# Patient Record
Sex: FEMALE | Race: OTHER | NOT HISPANIC OR LATINO | URBAN - METROPOLITAN AREA
[De-identification: names, ages, dates, MRNs, and addresses within clinical notes are randomized per-mention and may not be internally consistent; named-entity substitution may affect disease eponyms.]

---

## 2017-02-19 ENCOUNTER — EMERGENCY (EMERGENCY)
Facility: HOSPITAL | Age: 32
LOS: 1 days | Discharge: HOME | End: 2017-02-19
Admitting: EMERGENCY MEDICINE
Payer: COMMERCIAL

## 2017-02-19 VITALS
OXYGEN SATURATION: 100 % | DIASTOLIC BLOOD PRESSURE: 88 MMHG | RESPIRATION RATE: 16 BRPM | HEART RATE: 119 BPM | SYSTOLIC BLOOD PRESSURE: 130 MMHG | TEMPERATURE: 99 F

## 2017-02-19 VITALS
DIASTOLIC BLOOD PRESSURE: 70 MMHG | RESPIRATION RATE: 20 BRPM | TEMPERATURE: 99 F | HEART RATE: 102 BPM | SYSTOLIC BLOOD PRESSURE: 110 MMHG | OXYGEN SATURATION: 100 %

## 2017-02-19 LAB
ALBUMIN SERPL ELPH-MCNC: 4.5 G/DL — SIGNIFICANT CHANGE UP (ref 3.3–5)
ALP SERPL-CCNC: 52 U/L — SIGNIFICANT CHANGE UP (ref 40–120)
ALT FLD-CCNC: 7 U/L — SIGNIFICANT CHANGE UP (ref 4–33)
APPEARANCE UR: CLEAR — SIGNIFICANT CHANGE UP
AST SERPL-CCNC: 12 U/L — SIGNIFICANT CHANGE UP (ref 4–32)
BACTERIA # UR AUTO: SIGNIFICANT CHANGE UP
BILIRUB SERPL-MCNC: 0.2 MG/DL — SIGNIFICANT CHANGE UP (ref 0.2–1.2)
BILIRUB UR-MCNC: NEGATIVE — SIGNIFICANT CHANGE UP
BLD GP AB SCN SERPL QL: NEGATIVE — SIGNIFICANT CHANGE UP
BLOOD UR QL VISUAL: HIGH
BUN SERPL-MCNC: 8 MG/DL — SIGNIFICANT CHANGE UP (ref 7–23)
CALCIUM SERPL-MCNC: 9.2 MG/DL — SIGNIFICANT CHANGE UP (ref 8.4–10.5)
CHLORIDE SERPL-SCNC: 99 MMOL/L — SIGNIFICANT CHANGE UP (ref 98–107)
CO2 SERPL-SCNC: 22 MMOL/L — SIGNIFICANT CHANGE UP (ref 22–31)
COLOR SPEC: SIGNIFICANT CHANGE UP
CREAT SERPL-MCNC: 0.52 MG/DL — SIGNIFICANT CHANGE UP (ref 0.5–1.3)
GLUCOSE SERPL-MCNC: 123 MG/DL — HIGH (ref 70–99)
GLUCOSE UR-MCNC: NEGATIVE — SIGNIFICANT CHANGE UP
HCG SERPL-ACNC: SIGNIFICANT CHANGE UP MIU/ML
HCT VFR BLD CALC: 35.6 % — SIGNIFICANT CHANGE UP (ref 34.5–45)
HGB BLD-MCNC: 11.7 G/DL — SIGNIFICANT CHANGE UP (ref 11.5–15.5)
KETONES UR-MCNC: NEGATIVE — SIGNIFICANT CHANGE UP
LEUKOCYTE ESTERASE UR-ACNC: NEGATIVE — SIGNIFICANT CHANGE UP
MCHC RBC-ENTMCNC: 28.2 PG — SIGNIFICANT CHANGE UP (ref 27–34)
MCHC RBC-ENTMCNC: 32.9 % — SIGNIFICANT CHANGE UP (ref 32–36)
MCV RBC AUTO: 85.8 FL — SIGNIFICANT CHANGE UP (ref 80–100)
MUCOUS THREADS # UR AUTO: SIGNIFICANT CHANGE UP
NITRITE UR-MCNC: NEGATIVE — SIGNIFICANT CHANGE UP
PH UR: 6 — SIGNIFICANT CHANGE UP (ref 4.6–8)
PLATELET # BLD AUTO: 209 K/UL — SIGNIFICANT CHANGE UP (ref 150–400)
PMV BLD: 12 FL — SIGNIFICANT CHANGE UP (ref 7–13)
POTASSIUM SERPL-MCNC: 3.9 MMOL/L — SIGNIFICANT CHANGE UP (ref 3.5–5.3)
POTASSIUM SERPL-SCNC: 3.9 MMOL/L — SIGNIFICANT CHANGE UP (ref 3.5–5.3)
PROT SERPL-MCNC: 8 G/DL — SIGNIFICANT CHANGE UP (ref 6–8.3)
PROT UR-MCNC: NEGATIVE — SIGNIFICANT CHANGE UP
RBC # BLD: 4.15 M/UL — SIGNIFICANT CHANGE UP (ref 3.8–5.2)
RBC # FLD: 15 % — HIGH (ref 10.3–14.5)
RBC CASTS # UR COMP ASSIST: SIGNIFICANT CHANGE UP (ref 0–?)
RH IG SCN BLD-IMP: POSITIVE — SIGNIFICANT CHANGE UP
SODIUM SERPL-SCNC: 137 MMOL/L — SIGNIFICANT CHANGE UP (ref 135–145)
SP GR SPEC: 1.01 — SIGNIFICANT CHANGE UP (ref 1–1.03)
SQUAMOUS # UR AUTO: SIGNIFICANT CHANGE UP
UROBILINOGEN FLD QL: NORMAL E.U. — SIGNIFICANT CHANGE UP (ref 0.1–0.2)
WBC # BLD: 8.65 K/UL — SIGNIFICANT CHANGE UP (ref 3.8–10.5)
WBC # FLD AUTO: 8.65 K/UL — SIGNIFICANT CHANGE UP (ref 3.8–10.5)
WBC UR QL: SIGNIFICANT CHANGE UP (ref 0–?)

## 2017-02-19 PROCEDURE — 93010 ELECTROCARDIOGRAM REPORT: CPT | Mod: NC

## 2017-02-19 PROCEDURE — 76830 TRANSVAGINAL US NON-OB: CPT | Mod: 26

## 2017-02-19 PROCEDURE — 99284 EMERGENCY DEPT VISIT MOD MDM: CPT | Mod: 25

## 2017-02-19 RX ORDER — SODIUM CHLORIDE 9 MG/ML
1000 INJECTION INTRAMUSCULAR; INTRAVENOUS; SUBCUTANEOUS ONCE
Qty: 0 | Refills: 0 | Status: COMPLETED | OUTPATIENT
Start: 2017-02-19 | End: 2017-02-19

## 2017-02-19 RX ADMIN — SODIUM CHLORIDE 1000 MILLILITER(S): 9 INJECTION INTRAMUSCULAR; INTRAVENOUS; SUBCUTANEOUS at 17:10

## 2017-02-19 NOTE — ED PROVIDER NOTE - PROGRESS NOTE DETAILS
NICHOLE Melvin: TVUS consistent with fetal demise. Discussed results with patient. Gyn consulted. The scribe's documentation has been prepared under my direction and personally reviewed by me in its entirety. I confirm that the note above accurately reflects all work, treatment, procedures, and medical decision making performed by me.  VINOD PENA PA-C NICHOLE Ambrosio: received as sign out from NICHOLE Melvin, seen by GYN, [facility] for out patient management. pt understands follow up instruction and agreed with plan.

## 2017-02-19 NOTE — ED PROVIDER NOTE - MEDICAL DECISION MAKING DETAILS
32 y/o F , currently 12 weeks pregnant by LMP presents with lower abdominal pain. Has not had confirmatory ultrasound. Plan: type and screen, hCG, transvaginal ultrasound to rule out ectopic pregnancy vs. miscarriage.

## 2017-02-19 NOTE — ED PROVIDER NOTE - OBJECTIVE STATEMENT
30 y/o F with no significant PMHx, , currently 12 weeks pregnant by LMP presents to the ED complaining of suprapubic cramping and vaginal spotting x3 days. Has not seen her GYN yet for confirmatory ultrasound. Complaining of lower back pain. Denies fevers, chills, nausea, vomiting, dysuria, or any other complaints. First pregnancy was a spontaneous .    GYN: Dr. Neil 32 y/o F with no significant PMHx, , currently 12 weeks pregnant by LMP presents to the ED complaining of suprapubic cramping and vaginal spotting x3 days. Has not seen her GYN yet for confirmatory ultrasound. Also complaining of lower back pain. Denies fevers, chills, nausea, vomiting, dysuria, or any other complaints. First pregnancy was a spontaneous .    GYN: Dr. Neil

## 2017-02-19 NOTE — ED PROVIDER NOTE - PLAN OF CARE
Follow up at your previously scheduled OBGYN appointment on 2/23 at 3:30pm or call office at [phone] for earlier appointment. Rest, drink plenty of fluids.  Advance activity as tolerated.  Continue all previously prescribed medications as directed. You can use motrin 600mg every 6-8 hours for pain or fever, and/or Tylenol 650 mg every 4 hours for pain/fever. Follow up with your primary care physician in 48-72 hours- bring copies of your results.  Return to the emergency department for chest pain, shortness of breath, dizziness, or worsening, concerning, or persistent symptoms.

## 2017-02-19 NOTE — ED ADULT TRIAGE NOTE - CHIEF COMPLAINT QUOTE
C/o vaginal spotting and lower abdominal pain x 4 days and fevers which occurs at night and night sweats x 2 weeks, and dizziness/lightheadedness x 3-4 days which started after 12 hours flight. 12 weeks pregnant. Denies saturated any pads/cough/chest pain/sob/lower extremities edema.

## 2017-02-19 NOTE — ED PROVIDER NOTE - NS ED MD SCRIBE ATTENDING SCRIBE SECTIONS
PAST MEDICAL/SURGICAL/SOCIAL HISTORY/REVIEW OF SYSTEMS/VITAL SIGNS( Pullset)/DISPOSITION/HIV/HISTORY OF PRESENT ILLNESS/PHYSICAL EXAM

## 2017-02-19 NOTE — ED PROVIDER NOTE - CARE PLAN
Principal Discharge DX:	Fetal demise due to miscarriage Principal Discharge DX:	Fetal demise due to miscarriage  Instructions for follow-up, activity and diet:	Follow up at your previously scheduled OBGYN appointment on 2/23 at 3:30pm or call office at [phone] for earlier appointment. Rest, drink plenty of fluids.  Advance activity as tolerated.  Continue all previously prescribed medications as directed. You can use motrin 600mg every 6-8 hours for pain or fever, and/or Tylenol 650 mg every 4 hours for pain/fever. Follow up with your primary care physician in 48-72 hours- bring copies of your results.  Return to the emergency department for chest pain, shortness of breath, dizziness, or worsening, concerning, or persistent symptoms.

## 2017-02-21 LAB
BACTERIA UR CULT: SIGNIFICANT CHANGE UP
SPECIMEN SOURCE: SIGNIFICANT CHANGE UP

## 2017-02-23 ENCOUNTER — APPOINTMENT (OUTPATIENT)
Dept: OBGYN | Facility: CLINIC | Age: 32
End: 2017-02-23

## 2017-02-23 VITALS
WEIGHT: 105 LBS | BODY MASS INDEX: 17.49 KG/M2 | SYSTOLIC BLOOD PRESSURE: 112 MMHG | HEART RATE: 98 BPM | HEIGHT: 65 IN | DIASTOLIC BLOOD PRESSURE: 75 MMHG

## 2017-02-24 ENCOUNTER — OUTPATIENT (OUTPATIENT)
Dept: OUTPATIENT SERVICES | Facility: HOSPITAL | Age: 32
LOS: 1 days | End: 2017-02-24

## 2017-02-24 VITALS
DIASTOLIC BLOOD PRESSURE: 58 MMHG | HEIGHT: 55 IN | RESPIRATION RATE: 16 BRPM | WEIGHT: 106.04 LBS | TEMPERATURE: 99 F | OXYGEN SATURATION: 98 % | HEART RATE: 89 BPM | SYSTOLIC BLOOD PRESSURE: 92 MMHG

## 2017-02-24 DIAGNOSIS — O02.1 MISSED ABORTION: ICD-10-CM

## 2017-02-24 DIAGNOSIS — K21.9 GASTRO-ESOPHAGEAL REFLUX DISEASE WITHOUT ESOPHAGITIS: ICD-10-CM

## 2017-02-24 DIAGNOSIS — Z98.890 OTHER SPECIFIED POSTPROCEDURAL STATES: Chronic | ICD-10-CM

## 2017-02-24 LAB
BLD GP AB SCN SERPL QL: NEGATIVE — SIGNIFICANT CHANGE UP
BUN SERPL-MCNC: 10 MG/DL — SIGNIFICANT CHANGE UP (ref 7–23)
CALCIUM SERPL-MCNC: 8.9 MG/DL — SIGNIFICANT CHANGE UP (ref 8.4–10.5)
CHLORIDE SERPL-SCNC: 100 MMOL/L — SIGNIFICANT CHANGE UP (ref 98–107)
CO2 SERPL-SCNC: 23 MMOL/L — SIGNIFICANT CHANGE UP (ref 22–31)
CREAT SERPL-MCNC: 0.59 MG/DL — SIGNIFICANT CHANGE UP (ref 0.5–1.3)
GLUCOSE SERPL-MCNC: 79 MG/DL — SIGNIFICANT CHANGE UP (ref 70–99)
HCT VFR BLD CALC: 33.5 % — LOW (ref 34.5–45)
HGB BLD-MCNC: 11 G/DL — LOW (ref 11.5–15.5)
MCHC RBC-ENTMCNC: 28.2 PG — SIGNIFICANT CHANGE UP (ref 27–34)
MCHC RBC-ENTMCNC: 32.8 % — SIGNIFICANT CHANGE UP (ref 32–36)
MCV RBC AUTO: 85.9 FL — SIGNIFICANT CHANGE UP (ref 80–100)
PLATELET # BLD AUTO: 188 K/UL — SIGNIFICANT CHANGE UP (ref 150–400)
PMV BLD: 12.6 FL — SIGNIFICANT CHANGE UP (ref 7–13)
POTASSIUM SERPL-MCNC: 3.8 MMOL/L — SIGNIFICANT CHANGE UP (ref 3.5–5.3)
POTASSIUM SERPL-SCNC: 3.8 MMOL/L — SIGNIFICANT CHANGE UP (ref 3.5–5.3)
RBC # BLD: 3.9 M/UL — SIGNIFICANT CHANGE UP (ref 3.8–5.2)
RBC # FLD: 15 % — HIGH (ref 10.3–14.5)
RH IG SCN BLD-IMP: POSITIVE — SIGNIFICANT CHANGE UP
SODIUM SERPL-SCNC: 139 MMOL/L — SIGNIFICANT CHANGE UP (ref 135–145)
WBC # BLD: 7.82 K/UL — SIGNIFICANT CHANGE UP (ref 3.8–10.5)
WBC # FLD AUTO: 7.82 K/UL — SIGNIFICANT CHANGE UP (ref 3.8–10.5)

## 2017-02-24 NOTE — H&P PST ADULT - NEGATIVE OPHTHALMOLOGIC SYMPTOMS
no loss of vision L/no blurred vision L/no pain R/no loss of vision R/no blurred vision R/no diplopia/no pain L/no lacrimation L/no scleral injection L/no photophobia/no irritation R/no discharge R/no scleral injection R/no discharge L/no lacrimation R/no irritation L

## 2017-02-24 NOTE — H&P PST ADULT - RS GEN PE MLT RESP DETAILS PC
good air movement/respirations non-labored/clear to auscultation bilaterally/no chest wall tenderness/breath sounds equal/airway patent

## 2017-02-24 NOTE — H&P PST ADULT - NEGATIVE PSYCHIATRIC SYMPTOMS
no suicidal ideation/no auditory hallucinations/no hyperactivity/no insomnia/no visual hallucinations/no anxiety/no mood swings/no depression/no paranoia/no memory loss/no agitation

## 2017-02-24 NOTE — H&P PST ADULT - NEGATIVE SKIN SYMPTOMS
no brittle nails/no hair loss/no itching/no dryness/no pitted nails/no rash/no change in size/color of mole/no tumor

## 2017-02-24 NOTE — H&P PST ADULT - NEGATIVE GENERAL GENITOURINARY SYMPTOMS
no flank pain R/no dysuria/no nocturia/no flank pain L/no incontinence/no urinary hesitancy/no urine discoloration/no bladder infections/no hematuria/normal urinary frequency

## 2017-02-24 NOTE — H&P PST ADULT - FAMILY HISTORY
Mother  Still living? Yes, Estimated age: Age Unknown  Family history of diabetes mellitus (DM), Age at diagnosis: Age Unknown  Family history of hypertension, Age at diagnosis: Age Unknown     Father  Still living? Unknown  Family history of hypertension, Age at diagnosis: Age Unknown     Aunt  Still living? No  Family history of stroke, Age at diagnosis: Age Unknown

## 2017-02-24 NOTE — H&P PST ADULT - NEGATIVE ENMT SYMPTOMS
no dry mouth/no nasal obstruction/no post-nasal discharge/no dysphagia/no vertigo/no nose bleeds/no ear pain/no nasal discharge/no hearing difficulty/no tinnitus/no recurrent cold sores/no abnormal taste sensation/no sinus symptoms/no nasal congestion

## 2017-02-24 NOTE — H&P PST ADULT - NSANTHOSAYNRD_GEN_A_CORE
No. DIEGO screening performed.  STOP BANG Legend: 0-2 = LOW Risk; 3-4 = INTERMEDIATE Risk; 5-8 = HIGH Risk/Never done

## 2017-02-24 NOTE — H&P PST ADULT - NEGATIVE GENERAL SYMPTOMS
no malaise/no polyuria/no anorexia/no sweating/no weight loss/no polyphagia/no fever/no polydipsia/no weight gain

## 2017-02-24 NOTE — H&P PST ADULT - NEGATIVE MUSCULOSKELETAL SYMPTOMS
no muscle weakness/no arthritis/no arthralgia/no stiffness/no leg pain R/no joint swelling/no myalgia/no arm pain L/no arm pain R/no neck pain/no muscle cramps/no leg pain L

## 2017-02-24 NOTE — H&P PST ADULT - NEGATIVE CARDIOVASCULAR SYMPTOMS
no peripheral edema/no chest pain/no claudication/no paroxysmal nocturnal dyspnea/no orthopnea/no palpitations

## 2017-02-24 NOTE — H&P PST ADULT - NEGATIVE BREAST SYMPTOMS
no breast tenderness R/no breast tenderness L/no breast lump L/no nipple discharge R/no nipple discharge L/no breast lump R

## 2017-02-24 NOTE — H&P PST ADULT - NEGATIVE NEUROLOGICAL SYMPTOMS
no hemiparesis/no facial palsy/no tremors/no vertigo/no difficulty walking/no headache/no confusion/no syncope/no loss of consciousness/no generalized seizures/no focal seizures/no weakness/no loss of sensation/no transient paralysis/no paresthesias

## 2017-02-24 NOTE — H&P PST ADULT - HISTORY OF PRESENT ILLNESS
30 y/o female with PMH of prior miscarriage  and GERD. Presents to PST with a preop dx of missed  and to be evaluated for a scheduled DVC on 17. Pt states is 12 wks but told current miscarriage occurred at 8 wks gestation. Pt is , LMP 16. Denies any complaints at this time.

## 2017-02-24 NOTE — H&P PST ADULT - NEGATIVE ALLERGY TYPES
no reactions to food/no indoor environmental allergies/no reactions to insect bites/no outdoor environmental allergies/no reactions to medicines/no reactions to animals

## 2017-02-24 NOTE — H&P PST ADULT - NEGATIVE GASTROINTESTINAL SYMPTOMS
no vomiting/no melena/no abdominal pain/no constipation/no flatulence/no change in bowel habits/no hematochezia/no nausea/no diarrhea

## 2017-02-26 ENCOUNTER — RESULT REVIEW (OUTPATIENT)
Age: 32
End: 2017-02-26

## 2017-02-27 ENCOUNTER — APPOINTMENT (OUTPATIENT)
Dept: OBGYN | Facility: HOSPITAL | Age: 32
End: 2017-02-27

## 2017-02-27 ENCOUNTER — OUTPATIENT (OUTPATIENT)
Dept: OUTPATIENT SERVICES | Facility: HOSPITAL | Age: 32
LOS: 1 days | Discharge: HOME | End: 2017-02-27
Payer: COMMERCIAL

## 2017-02-27 ENCOUNTER — TRANSCRIPTION ENCOUNTER (OUTPATIENT)
Age: 32
End: 2017-02-27

## 2017-02-27 VITALS
OXYGEN SATURATION: 100 % | RESPIRATION RATE: 20 BRPM | SYSTOLIC BLOOD PRESSURE: 103 MMHG | HEART RATE: 105 BPM | DIASTOLIC BLOOD PRESSURE: 60 MMHG | TEMPERATURE: 99 F

## 2017-02-27 VITALS
HEIGHT: 61 IN | DIASTOLIC BLOOD PRESSURE: 57 MMHG | OXYGEN SATURATION: 100 % | HEART RATE: 88 BPM | WEIGHT: 106.04 LBS | TEMPERATURE: 99 F | RESPIRATION RATE: 14 BRPM | SYSTOLIC BLOOD PRESSURE: 89 MMHG

## 2017-02-27 DIAGNOSIS — O02.1 MISSED ABORTION: ICD-10-CM

## 2017-02-27 DIAGNOSIS — Z98.890 OTHER SPECIFIED POSTPROCEDURAL STATES: Chronic | ICD-10-CM

## 2017-02-27 PROCEDURE — 88305 TISSUE EXAM BY PATHOLOGIST: CPT | Mod: 26

## 2017-02-27 PROCEDURE — 59820 CARE OF MISCARRIAGE: CPT | Mod: GC

## 2017-02-27 RX ORDER — FOLIC ACID 0.8 MG
1 TABLET ORAL
Qty: 0 | Refills: 0 | COMMUNITY

## 2017-02-27 RX ORDER — SODIUM CHLORIDE 9 MG/ML
1000 INJECTION, SOLUTION INTRAVENOUS
Qty: 0 | Refills: 0 | Status: DISCONTINUED | OUTPATIENT
Start: 2017-02-27 | End: 2017-02-27

## 2017-02-27 NOTE — ASU DISCHARGE PLAN (ADULT/PEDIATRIC). - MEDICATION SUMMARY - MEDICATIONS TO STOP TAKING
I will STOP taking the medications listed below when I get home from the hospital:    folic acid 1 mg oral tablet  -- 1 tab(s) by mouth once a day    Prenatal Multivitamins oral capsule  -- 1 cap(s) by mouth once a day

## 2017-02-27 NOTE — ASU DISCHARGE PLAN (ADULT/PEDIATRIC). - MEDICATION SUMMARY - MEDICATIONS TO TAKE
I will START or STAY ON the medications listed below when I get home from the hospital:    Tylenol 500 mg oral tablet  -- 2 tab(s) by mouth every 6 hours, As Needed  -- Indication: For post-op pain    PriLOSEC 20 mg oral delayed release capsule  -- 1 cap(s) by mouth once a day  -- Indication: For home med

## 2017-02-27 NOTE — ASU DISCHARGE PLAN (ADULT/PEDIATRIC). - NURSING INSTRUCTIONS
You were given IV Tylenol for pain management.  Please DO NOT take tylenol or products that contain tylenol for the next 6-8 hours (until 3:25PM). Please do not exceed 3000mg in 24hours. You were given Toradol for pain management. Please DO Not take Motrin/Ibuprofen/Advil or Aleve (NSAIDS) for the next 6 hours (Until 3:15PM).

## 2017-02-27 NOTE — ASU DISCHARGE PLAN (ADULT/PEDIATRIC). - SPECIAL INSTRUCTIONS
Nothing in vagina, no intercourse, no douching, no tampons, no tub baths, and no swimming for about 2 weeks or until cleared by MD. Contact your doctor if you are soaking a pad every 30 minutes to an houror experience clots. Call your doctor for any SIGNS OR SYMPTOMS OF INFECTION: Fever, chills, temperature  100.4 or higher or have a foul smelling discharge.

## 2017-03-01 ENCOUNTER — CLINICAL ADVICE (OUTPATIENT)
Age: 32
End: 2017-03-01

## 2017-03-15 ENCOUNTER — RESULT REVIEW (OUTPATIENT)
Age: 32
End: 2017-03-15

## 2017-03-16 LAB — SURGICAL PATHOLOGY STUDY: SIGNIFICANT CHANGE UP

## 2017-03-16 PROCEDURE — 88363 XM ARCHIVE TISSUE MOLEC ANAL: CPT

## 2017-03-17 ENCOUNTER — APPOINTMENT (OUTPATIENT)
Dept: OBGYN | Facility: CLINIC | Age: 32
End: 2017-03-17

## 2017-03-17 VITALS
DIASTOLIC BLOOD PRESSURE: 76 MMHG | SYSTOLIC BLOOD PRESSURE: 112 MMHG | HEIGHT: 65 IN | WEIGHT: 108 LBS | BODY MASS INDEX: 17.99 KG/M2 | HEART RATE: 102 BPM

## 2017-03-24 ENCOUNTER — APPOINTMENT (OUTPATIENT)
Dept: HUMAN REPRODUCTION | Facility: CLINIC | Age: 32
End: 2017-03-24

## 2017-03-28 LAB — SURGICAL PATHOLOGY STUDY: SIGNIFICANT CHANGE UP

## 2017-03-30 ENCOUNTER — APPOINTMENT (OUTPATIENT)
Dept: OBGYN | Facility: CLINIC | Age: 32
End: 2017-03-30

## 2017-03-30 VITALS
BODY MASS INDEX: 18.16 KG/M2 | HEART RATE: 99 BPM | WEIGHT: 109 LBS | HEIGHT: 65 IN | DIASTOLIC BLOOD PRESSURE: 74 MMHG | SYSTOLIC BLOOD PRESSURE: 119 MMHG

## 2017-04-03 ENCOUNTER — MESSAGE (OUTPATIENT)
Age: 32
End: 2017-04-03

## 2017-04-03 DIAGNOSIS — Z87.440 PERSONAL HISTORY OF URINARY (TRACT) INFECTIONS: ICD-10-CM

## 2017-04-03 LAB
BACTERIA UR CULT: ABNORMAL
HCG SERPL-MCNC: 8 MIU/ML

## 2017-04-06 ENCOUNTER — OTHER (OUTPATIENT)
Age: 32
End: 2017-04-06

## 2017-04-07 LAB — HCG SERPL-MCNC: 5 MIU/ML

## 2017-04-21 ENCOUNTER — APPOINTMENT (OUTPATIENT)
Dept: HUMAN REPRODUCTION | Facility: CLINIC | Age: 32
End: 2017-04-21

## 2017-04-26 LAB
HCG SERPL-MCNC: 2 MIU/ML
HCG SERPL-MCNC: 3 MIU/ML

## 2017-04-27 ENCOUNTER — OTHER (OUTPATIENT)
Age: 32
End: 2017-04-27

## 2017-05-04 ENCOUNTER — RX RENEWAL (OUTPATIENT)
Age: 32
End: 2017-05-04

## 2017-05-18 LAB — HCG SERPL-MCNC: 33 MIU/ML

## 2017-06-05 ENCOUNTER — APPOINTMENT (OUTPATIENT)
Dept: GYNECOLOGIC ONCOLOGY | Facility: CLINIC | Age: 32
End: 2017-06-05

## 2017-06-05 VITALS
HEIGHT: 65 IN | WEIGHT: 102 LBS | BODY MASS INDEX: 17 KG/M2 | DIASTOLIC BLOOD PRESSURE: 74 MMHG | SYSTOLIC BLOOD PRESSURE: 104 MMHG | HEART RATE: 102 BPM | TEMPERATURE: 98.9 F | OXYGEN SATURATION: 99 %

## 2017-06-12 LAB
HCG SERPL-MCNC: 1 MIU/ML
HCG-TM SERPL-MCNC: <1 MIU/ML

## 2017-06-19 LAB — HCG-TM SERPL-MCNC: <1 MIU/ML

## 2017-06-26 LAB
HCG SERPL-MCNC: <1 MIU/ML
HCG-TM SERPL-MCNC: <1 MIU/ML

## 2017-07-12 ENCOUNTER — OTHER (OUTPATIENT)
Age: 32
End: 2017-07-12

## 2017-07-14 ENCOUNTER — APPOINTMENT (OUTPATIENT)
Dept: OBGYN | Facility: CLINIC | Age: 32
End: 2017-07-14

## 2017-07-14 LAB — HCG SERPL-MCNC: <1 MIU/ML

## 2017-07-17 ENCOUNTER — APPOINTMENT (OUTPATIENT)
Dept: GYNECOLOGIC ONCOLOGY | Facility: CLINIC | Age: 32
End: 2017-07-17

## 2017-08-16 ENCOUNTER — OTHER (OUTPATIENT)
Age: 32
End: 2017-08-16

## 2017-08-16 LAB — HCG SERPL-MCNC: <1 MIU/ML

## 2017-08-17 ENCOUNTER — EMERGENCY (EMERGENCY)
Facility: HOSPITAL | Age: 32
LOS: 1 days | Discharge: HOME | End: 2017-08-17
Attending: EMERGENCY MEDICINE
Payer: COMMERCIAL

## 2017-08-17 VITALS
OXYGEN SATURATION: 100 % | WEIGHT: 102.07 LBS | RESPIRATION RATE: 18 BRPM | HEART RATE: 93 BPM | DIASTOLIC BLOOD PRESSURE: 78 MMHG | TEMPERATURE: 99 F | SYSTOLIC BLOOD PRESSURE: 126 MMHG | HEIGHT: 65 IN

## 2017-08-17 DIAGNOSIS — Z91.013 ALLERGY TO SEAFOOD: ICD-10-CM

## 2017-08-17 DIAGNOSIS — K21.9 GASTRO-ESOPHAGEAL REFLUX DISEASE WITHOUT ESOPHAGITIS: ICD-10-CM

## 2017-08-17 DIAGNOSIS — Z98.890 OTHER SPECIFIED POSTPROCEDURAL STATES: Chronic | ICD-10-CM

## 2017-08-17 DIAGNOSIS — E87.6 HYPOKALEMIA: ICD-10-CM

## 2017-08-17 DIAGNOSIS — K52.9 NONINFECTIVE GASTROENTERITIS AND COLITIS, UNSPECIFIED: ICD-10-CM

## 2017-08-17 LAB
ALBUMIN SERPL ELPH-MCNC: 3.8 G/DL — SIGNIFICANT CHANGE UP (ref 3.5–5)
ALP SERPL-CCNC: 55 U/L — SIGNIFICANT CHANGE UP (ref 40–120)
ALT FLD-CCNC: 21 U/L DA — SIGNIFICANT CHANGE UP (ref 10–60)
ANION GAP SERPL CALC-SCNC: 10 MMOL/L — SIGNIFICANT CHANGE UP (ref 5–17)
AST SERPL-CCNC: 15 U/L — SIGNIFICANT CHANGE UP (ref 10–40)
BILIRUB SERPL-MCNC: 0.3 MG/DL — SIGNIFICANT CHANGE UP (ref 0.2–1.2)
BUN SERPL-MCNC: 9 MG/DL — SIGNIFICANT CHANGE UP (ref 7–18)
CALCIUM SERPL-MCNC: 8.7 MG/DL — SIGNIFICANT CHANGE UP (ref 8.4–10.5)
CHLORIDE SERPL-SCNC: 108 MMOL/L — SIGNIFICANT CHANGE UP (ref 96–108)
CO2 SERPL-SCNC: 22 MMOL/L — SIGNIFICANT CHANGE UP (ref 22–31)
CREAT SERPL-MCNC: 0.72 MG/DL — SIGNIFICANT CHANGE UP (ref 0.5–1.3)
GLUCOSE SERPL-MCNC: 92 MG/DL — SIGNIFICANT CHANGE UP (ref 70–99)
HCT VFR BLD CALC: 30.1 % — LOW (ref 34.5–45)
HGB BLD-MCNC: 9.3 G/DL — LOW (ref 11.5–15.5)
LIDOCAIN IGE QN: 250 U/L — SIGNIFICANT CHANGE UP (ref 73–393)
MCHC RBC-ENTMCNC: 23.6 PG — LOW (ref 27–34)
MCHC RBC-ENTMCNC: 30.8 GM/DL — LOW (ref 32–36)
MCV RBC AUTO: 76.5 FL — LOW (ref 80–100)
PLATELET # BLD AUTO: 154 K/UL — SIGNIFICANT CHANGE UP (ref 150–400)
POTASSIUM SERPL-MCNC: 3.3 MMOL/L — LOW (ref 3.5–5.3)
POTASSIUM SERPL-SCNC: 3.3 MMOL/L — LOW (ref 3.5–5.3)
PROT SERPL-MCNC: 8.2 G/DL — SIGNIFICANT CHANGE UP (ref 6–8.3)
RBC # BLD: 3.93 M/UL — SIGNIFICANT CHANGE UP (ref 3.8–5.2)
RBC # FLD: 15.4 % — HIGH (ref 10.3–14.5)
SODIUM SERPL-SCNC: 140 MMOL/L — SIGNIFICANT CHANGE UP (ref 135–145)
WBC # BLD: 7.6 K/UL — SIGNIFICANT CHANGE UP (ref 3.8–10.5)
WBC # FLD AUTO: 7.6 K/UL — SIGNIFICANT CHANGE UP (ref 3.8–10.5)

## 2017-08-17 PROCEDURE — 99284 EMERGENCY DEPT VISIT MOD MDM: CPT

## 2017-08-17 RX ORDER — FAMOTIDINE 10 MG/ML
20 INJECTION INTRAVENOUS DAILY
Qty: 0 | Refills: 0 | Status: DISCONTINUED | OUTPATIENT
Start: 2017-08-17 | End: 2017-08-21

## 2017-08-17 RX ADMIN — Medication 30 MILLILITER(S): at 22:45

## 2017-08-17 RX ADMIN — FAMOTIDINE 20 MILLIGRAM(S): 10 INJECTION INTRAVENOUS at 22:45

## 2017-08-18 ENCOUNTER — OTHER (OUTPATIENT)
Age: 32
End: 2017-08-18

## 2017-08-18 VITALS
TEMPERATURE: 99 F | RESPIRATION RATE: 18 BRPM | HEART RATE: 107 BPM | OXYGEN SATURATION: 100 % | DIASTOLIC BLOOD PRESSURE: 64 MMHG | SYSTOLIC BLOOD PRESSURE: 112 MMHG

## 2017-08-18 LAB
APPEARANCE UR: CLEAR — SIGNIFICANT CHANGE UP
BILIRUB UR-MCNC: NEGATIVE — SIGNIFICANT CHANGE UP
C DIFF BY PCR RESULT: DETECTED
C DIFF TOX GENS STL QL NAA+PROBE: SIGNIFICANT CHANGE UP
COLOR SPEC: YELLOW — SIGNIFICANT CHANGE UP
DIFF PNL FLD: NEGATIVE — SIGNIFICANT CHANGE UP
GLUCOSE UR QL: NEGATIVE — SIGNIFICANT CHANGE UP
HCG UR QL: NEGATIVE — SIGNIFICANT CHANGE UP
KETONES UR-MCNC: NEGATIVE — SIGNIFICANT CHANGE UP
LEUKOCYTE ESTERASE UR-ACNC: NEGATIVE — SIGNIFICANT CHANGE UP
MAGNESIUM SERPL-MCNC: 2.1 MG/DL — SIGNIFICANT CHANGE UP (ref 1.6–2.6)
NITRITE UR-MCNC: NEGATIVE — SIGNIFICANT CHANGE UP
PH UR: 6 — SIGNIFICANT CHANGE UP (ref 5–8)
PHOSPHATE SERPL-MCNC: 2.5 MG/DL — SIGNIFICANT CHANGE UP (ref 2.5–4.5)
PROT UR-MCNC: NEGATIVE — SIGNIFICANT CHANGE UP
SP GR SPEC: 1 — LOW (ref 1.01–1.02)
UROBILINOGEN FLD QL: NEGATIVE — SIGNIFICANT CHANGE UP

## 2017-08-18 PROCEDURE — 87046 STOOL CULTR AEROBIC BACT EA: CPT

## 2017-08-18 PROCEDURE — 74177 CT ABD & PELVIS W/CONTRAST: CPT

## 2017-08-18 PROCEDURE — 81025 URINE PREGNANCY TEST: CPT

## 2017-08-18 PROCEDURE — 83690 ASSAY OF LIPASE: CPT

## 2017-08-18 PROCEDURE — 80053 COMPREHEN METABOLIC PANEL: CPT

## 2017-08-18 PROCEDURE — 96374 THER/PROPH/DIAG INJ IV PUSH: CPT | Mod: XU

## 2017-08-18 PROCEDURE — 87493 C DIFF AMPLIFIED PROBE: CPT

## 2017-08-18 PROCEDURE — 84100 ASSAY OF PHOSPHORUS: CPT

## 2017-08-18 PROCEDURE — 87086 URINE CULTURE/COLONY COUNT: CPT

## 2017-08-18 PROCEDURE — 99284 EMERGENCY DEPT VISIT MOD MDM: CPT | Mod: 25

## 2017-08-18 PROCEDURE — 87045 FECES CULTURE AEROBIC BACT: CPT

## 2017-08-18 PROCEDURE — 81003 URINALYSIS AUTO W/O SCOPE: CPT

## 2017-08-18 PROCEDURE — 85027 COMPLETE CBC AUTOMATED: CPT

## 2017-08-18 PROCEDURE — 83735 ASSAY OF MAGNESIUM: CPT

## 2017-08-18 PROCEDURE — 74177 CT ABD & PELVIS W/CONTRAST: CPT | Mod: 26

## 2017-08-18 RX ORDER — SUCRALFATE 1 G
10 TABLET ORAL
Qty: 210 | Refills: 0
Start: 2017-08-18 | End: 2017-08-25

## 2017-08-18 RX ORDER — DIPHENHYDRAMINE HCL 50 MG
50 CAPSULE ORAL ONCE
Qty: 0 | Refills: 0 | Status: COMPLETED | OUTPATIENT
Start: 2017-08-18 | End: 2017-08-18

## 2017-08-18 RX ORDER — POTASSIUM CHLORIDE 20 MEQ
40 PACKET (EA) ORAL ONCE
Qty: 0 | Refills: 0 | Status: COMPLETED | OUTPATIENT
Start: 2017-08-18 | End: 2017-08-18

## 2017-08-18 RX ORDER — SODIUM CHLORIDE 9 MG/ML
1000 INJECTION, SOLUTION INTRAVENOUS
Qty: 0 | Refills: 0 | Status: COMPLETED | OUTPATIENT
Start: 2017-08-18 | End: 2017-08-18

## 2017-08-18 RX ORDER — SUCRALFATE 1 G
1 TABLET ORAL ONCE
Qty: 0 | Refills: 0 | Status: COMPLETED | OUTPATIENT
Start: 2017-08-18 | End: 2017-08-18

## 2017-08-18 RX ORDER — SODIUM CHLORIDE 9 MG/ML
1000 INJECTION INTRAMUSCULAR; INTRAVENOUS; SUBCUTANEOUS ONCE
Qty: 0 | Refills: 0 | Status: COMPLETED | OUTPATIENT
Start: 2017-08-18 | End: 2017-08-18

## 2017-08-18 RX ORDER — METRONIDAZOLE 500 MG
1 TABLET ORAL
Qty: 42 | Refills: 0
Start: 2017-08-18 | End: 2017-09-01

## 2017-08-18 RX ADMIN — SODIUM CHLORIDE 1000 MILLILITER(S): 9 INJECTION INTRAMUSCULAR; INTRAVENOUS; SUBCUTANEOUS at 00:13

## 2017-08-18 RX ADMIN — Medication 50 MILLIGRAM(S): at 06:44

## 2017-08-18 RX ADMIN — Medication 40 MILLIEQUIVALENT(S): at 00:13

## 2017-08-18 RX ADMIN — SODIUM CHLORIDE 1000 MILLILITER(S): 9 INJECTION, SOLUTION INTRAVENOUS at 05:34

## 2017-08-18 RX ADMIN — Medication 1 GRAM(S): at 07:46

## 2017-08-18 NOTE — ED PROVIDER NOTE - OBJECTIVE STATEMENT
33 y/o F pt with a significant PMHx of GERD (on Prilosec), D&C x2 2/2 miscarriage presents to the ED c/o abd burning and cramping, nausea, chills and copious NB diarrhea. Pt reports to have 3 people at home with similar symptoms, but states their symptoms have since resolved. Pt states she was on abx last month for a tooth infection. Pt also states she had similar sx recently; pt received a GI workup including a colonoscopy. Pt was told everything was fine and was instructed to continue her Prilosec. Pt denies fevers,  complaints, GYN complaints, recent travel or any other complaints. NKDA.

## 2017-08-18 NOTE — ED PROVIDER NOTE - PROGRESS NOTE DETAILS
feeling better, dale PO, no abd ttp, discussed viral syndrome vs possible c-diff as she did take abx in last month but also does has 3 family members w what sounds like viral gastroenteritis. Can take flagyl until ray cx returns negative, should follow up w her PMD, and also w GI this week.

## 2017-08-18 NOTE — ED PROVIDER NOTE - CARE PLAN
Principal Discharge DX:	Gastroenteritis Principal Discharge DX:	Gastroenteritis  Secondary Diagnosis:	Hypokalemia

## 2017-08-18 NOTE — ED PROVIDER NOTE - MEDICAL DECISION MAKING DETAILS
33 y/o F pt presents to ED c/o abd burning and cramping. Will order labs, CT, stool specimen and reassess. 33 y/o F pt presents to ED c/o abd burning and cramping, N/D. Will order labs, CT, stool specimen and reassess.

## 2017-08-19 LAB
CULTURE RESULTS: SIGNIFICANT CHANGE UP
SPECIMEN SOURCE: SIGNIFICANT CHANGE UP

## 2017-08-21 LAB
CULTURE RESULTS: SIGNIFICANT CHANGE UP
SPECIMEN SOURCE: SIGNIFICANT CHANGE UP

## 2017-08-21 NOTE — ED POST DISCHARGE NOTE - ADDITIONAL DOCUMENTATION
Pt feels better, diarrhea resolved. Advised to continue with flagyl and pt will f/u with PMD Dr. Aponte. Return precautions advised.

## 2017-10-09 ENCOUNTER — APPOINTMENT (OUTPATIENT)
Dept: OBGYN | Facility: CLINIC | Age: 32
End: 2017-10-09
Payer: COMMERCIAL

## 2017-10-09 VITALS
SYSTOLIC BLOOD PRESSURE: 102 MMHG | HEART RATE: 89 BPM | BODY MASS INDEX: 17.33 KG/M2 | DIASTOLIC BLOOD PRESSURE: 70 MMHG | WEIGHT: 104 LBS | HEIGHT: 65 IN

## 2017-10-09 DIAGNOSIS — O08.89 OTHER COMPLICATIONS FOLLOWING AN ECTOPIC AND MOLAR PREGNANCY: ICD-10-CM

## 2017-10-09 PROCEDURE — 99213 OFFICE O/P EST LOW 20 MIN: CPT | Mod: 25

## 2017-10-09 PROCEDURE — 99395 PREV VISIT EST AGE 18-39: CPT

## 2017-10-18 LAB
C TRACH RRNA SPEC QL NAA+PROBE: NOT DETECTED
CYTOLOGY CVX/VAG DOC THIN PREP: NORMAL
HCG SERPL-MCNC: <1 MIU/ML
HPV HIGH+LOW RISK DNA PNL CVX: NOT DETECTED
N GONORRHOEA RRNA SPEC QL NAA+PROBE: NOT DETECTED
SOURCE AMPLIFICATION: NORMAL

## 2017-10-18 RX ORDER — AMOXICILLIN 500 MG/1
500 CAPSULE ORAL 3 TIMES DAILY
Qty: 21 | Refills: 0 | Status: DISCONTINUED | COMMUNITY
Start: 2017-04-03 | End: 2017-10-18

## 2017-10-18 RX ORDER — DOXYCYCLINE HYCLATE 100 MG/1
100 CAPSULE ORAL
Qty: 4 | Refills: 2 | Status: DISCONTINUED | COMMUNITY
Start: 2017-03-27 | End: 2017-10-18

## 2017-10-18 RX ORDER — HYDROCORTISONE ACETATE, PRAMOXINE HCL 2.5; 1 G/100G; G/100G
2.5-1 CREAM TOPICAL
Refills: 0 | Status: COMPLETED | COMMUNITY

## 2017-10-18 RX ORDER — DOXYCYCLINE HYCLATE 100 MG/1
100 TABLET ORAL
Qty: 8 | Refills: 0 | Status: DISCONTINUED | COMMUNITY
Start: 2017-03-24 | End: 2017-10-18

## 2017-12-01 ENCOUNTER — APPOINTMENT (OUTPATIENT)
Dept: HUMAN REPRODUCTION | Facility: CLINIC | Age: 32
End: 2017-12-01
Payer: COMMERCIAL

## 2017-12-01 PROCEDURE — 99215 OFFICE O/P EST HI 40 MIN: CPT

## 2020-07-22 NOTE — ASU PREOP CHECKLIST - WAS PATIENT ON BETA BLOCKER?
Date of Consent: 7/22/2020    Sponsor: [facility]    Study Title/IRB Number: Observational study of Sars-CoV2 Immunoglobulin G (IgG) seroprevalence among the [facility] population over time 2020.163  Principle Investigator: Litzy Granados, PhD    Did the patient need translation services? No   name: N/A    Prior to the Informed Consent (IC) being signed, or any study protocol required data collection, testing, procedure, or intervention being performed, the following was done and/or discussed:   Patient was given a paper copy of the IC for review    Patient was given study FAQ   Purpose of the study and qualifications to participate    Study design and tests or procedures done at this visit   Confidentiality and HIPAA Authorization for Release of Medical Records for the research trial/ subject's rights/research related injury   Risk, Benefits, Alternative Treatments, Compensation and Costs   Participation in the research trial is voluntary and patient may withdraw at anytime   Contact information for study related questions    Patient verbalizes understanding of the above: Yes  Contact information for PI and IRB given to patient: Yes  Patient able to adequately summarize: the purpose of the study, the risks associated with the study, and all procedures, testing, and follow-ups associated with the study: Yes    The consent was discussed verbally with the patient and all questions were answered satisfactorily. Patient gave verbal consent for the Seroprevalence research study with an IRB approval date of 06/23/2020.      The Consent, Consent Witness and name of Clinical Research Coordinator consenting was captured and documented in REDCap.    All Inclusion and Exclusion Criteria reviewed, subject meets all Inclusion criteria and does not meet any Exclusion Criteria at this time.     Patient Eligibility was confirmed.    Patient responded to survey questions.    The following biospecimen  collection procedures were collected:    -Nasopharyngeal Swab Collection  -Blood collection        No

## 2020-09-09 ENCOUNTER — APPOINTMENT (OUTPATIENT)
Dept: OBGYN | Facility: CLINIC | Age: 35
End: 2020-09-09
Payer: COMMERCIAL

## 2020-09-09 VITALS
DIASTOLIC BLOOD PRESSURE: 84 MMHG | SYSTOLIC BLOOD PRESSURE: 133 MMHG | BODY MASS INDEX: 19.66 KG/M2 | WEIGHT: 118 LBS | HEIGHT: 65 IN | HEART RATE: 101 BPM

## 2020-09-09 DIAGNOSIS — Z01.419 ENCOUNTER FOR GYNECOLOGICAL EXAMINATION (GENERAL) (ROUTINE) W/OUT ABNORMAL FINDINGS: ICD-10-CM

## 2020-09-09 DIAGNOSIS — N94.6 DYSMENORRHEA, UNSPECIFIED: ICD-10-CM

## 2020-09-09 DIAGNOSIS — O09.A0 SUPERVISION OF PREGNANCY WITH HISTORY OF MOLAR PREGNANCY, UNSPECIFIED TRIMESTER: ICD-10-CM

## 2020-09-09 PROCEDURE — 99395 PREV VISIT EST AGE 18-39: CPT

## 2020-09-09 RX ORDER — DESOGESTREL AND ETHINYL ESTRADIOL 0.15-0.03
0.15-3 KIT ORAL DAILY
Qty: 1 | Refills: 3 | Status: DISCONTINUED | COMMUNITY
Start: 2017-03-24 | End: 2020-09-09

## 2020-09-09 RX ORDER — DESOGESTREL AND ETHINYL ESTRADIOL 0.15-0.03
0.15-3 KIT ORAL
Qty: 1 | Refills: 1 | Status: DISCONTINUED | COMMUNITY
Start: 2017-09-13 | End: 2020-09-09

## 2020-09-09 NOTE — HISTORY OF PRESENT ILLNESS
[Last Pap ___] : Last cervical pap smear was [unfilled] [Last Mammogram ___] : Last Mammogram was [unfilled] [Sexually Active] : is sexually active [Monogamous] : is monogamous

## 2020-09-09 NOTE — COUNSELING
[Breast Self Exam] : breast self exam [Nutrition] : nutrition [Exercise] : exercise [Vitamins/Supplements] : vitamins/supplements [Menstrual Calendar] : menstrual calendar [Fertility Options] : fertility options

## 2020-09-09 NOTE — PHYSICAL EXAM
[Alert] : alert [Awake] : awake [Mass] : no breast mass [Acute Distress] : no acute distress [Axillary LAD] : no axillary lymphadenopathy [Nipple Discharge] : no nipple discharge [Tender] : non tender [Oriented x3] : oriented to person, place, and time [Soft] : soft [Uterine Adnexae] : were not tender and not enlarged [No Bleeding] : there was no active vaginal bleeding [Normal] : uterus

## 2020-09-15 ENCOUNTER — APPOINTMENT (OUTPATIENT)
Dept: OBGYN | Facility: CLINIC | Age: 35
End: 2020-09-15
Payer: COMMERCIAL

## 2020-09-15 ENCOUNTER — ASOB RESULT (OUTPATIENT)
Age: 35
End: 2020-09-15

## 2020-09-15 PROCEDURE — 76830 TRANSVAGINAL US NON-OB: CPT

## 2020-09-24 LAB
CYTOLOGY CVX/VAG DOC THIN PREP: NORMAL
HPV HIGH+LOW RISK DNA PNL CVX: NOT DETECTED

## 2021-06-09 ENCOUNTER — APPOINTMENT (OUTPATIENT)
Dept: OBGYN | Facility: CLINIC | Age: 36
End: 2021-06-09
Payer: COMMERCIAL

## 2021-06-09 VITALS
WEIGHT: 124 LBS | BODY MASS INDEX: 20.66 KG/M2 | DIASTOLIC BLOOD PRESSURE: 79 MMHG | HEIGHT: 65 IN | SYSTOLIC BLOOD PRESSURE: 120 MMHG | HEART RATE: 96 BPM

## 2021-06-09 DIAGNOSIS — N97.9 FEMALE INFERTILITY, UNSPECIFIED: ICD-10-CM

## 2021-06-09 LAB
HCG UR QL: NEGATIVE
QUALITY CONTROL: YES

## 2021-06-09 PROCEDURE — 99213 OFFICE O/P EST LOW 20 MIN: CPT

## 2021-06-09 PROCEDURE — 99072 ADDL SUPL MATRL&STAF TM PHE: CPT

## 2021-06-09 NOTE — HISTORY OF PRESENT ILLNESS
[FreeTextEntry1] : 37 y/o F here with . Irregular menstrual cycles and difficulty conceiving. Reports intercourse at least 4x monthly. LMP 4/9. HCG negative.

## 2021-06-09 NOTE — PHYSICAL EXAM
[Appropriately responsive] : appropriately responsive [Alert] : alert [No Acute Distress] : no acute distress [No Murmurs] : no murmurs [Soft] : soft [Non-tender] : non-tender [Non-distended] : non-distended [No HSM] : No HSM [No Lesions] : no lesions [No Mass] : no mass [Oriented x3] : oriented x3 [Labia Majora] : normal [Labia Minora] : normal [Normal] : normal [Uterine Adnexae] : normal

## 2021-06-09 NOTE — DISCUSSION/SUMMARY
[FreeTextEntry1] : Annovulatory process considered. Discussed TVUS to evaluate and possile withdrawl bleed with ovulation induction recommended.

## 2021-06-19 NOTE — ED ADULT NURSE NOTE - GENITOURINARY WDL
Letter by Mayito Carlos MD at      Author: Mayito Carlos MD Service: -- Author Type: --    Filed:  Encounter Date: 7/3/2019 Status: (Other)         Wilfrid Ortez  [address]  [place] [address]             July 3, 2019         Dear Mr. Ortez,    Below are the results from your recent visit:    Resulted Orders   Glycosylated Hemoglobin A1c   Result Value Ref Range    Hemoglobin A1c 6.9 (H) 3.5 - 6.0 %   Glucose   Result Value Ref Range    Glucose 147 (H) 70 - 125 mg/dL    Patient Fasting > 8hrs? Yes     Narrative    Fasting Glucose reference range is 70-99 mg/dL per  American Diabetes Association (ADA) guidelines.   Lipid Cascade   Result Value Ref Range    Cholesterol 147 <=199 mg/dL    Triglycerides 149 <=149 mg/dL    HDL Cholesterol 48 >=40 mg/dL    LDL Calculated 69 <=129 mg/dL    Patient Fasting > 8hrs? Yes         All very good results     Please call with questions or contact us using [employer].    Sincerely,        Electronically signed by Mayito Carlos MD        Bladder non-tender and non-distended. Urine clear yellow.

## 2021-06-22 ENCOUNTER — APPOINTMENT (OUTPATIENT)
Dept: OBGYN | Facility: CLINIC | Age: 36
End: 2021-06-22
Payer: COMMERCIAL

## 2021-06-22 ENCOUNTER — ASOB RESULT (OUTPATIENT)
Age: 36
End: 2021-06-22

## 2021-06-22 PROCEDURE — 76830 TRANSVAGINAL US NON-OB: CPT

## 2021-06-22 PROCEDURE — 99072 ADDL SUPL MATRL&STAF TM PHE: CPT

## 2021-07-02 ENCOUNTER — NON-APPOINTMENT (OUTPATIENT)
Age: 36
End: 2021-07-02

## 2021-07-18 NOTE — ED ADULT NURSE NOTE - READING LANGUAGE PREFERRED
.Nely Orr  .  
English

## 2021-09-17 ENCOUNTER — APPOINTMENT (OUTPATIENT)
Dept: HUMAN REPRODUCTION | Facility: CLINIC | Age: 36
End: 2021-09-17
Payer: COMMERCIAL

## 2021-09-17 PROCEDURE — 99205 OFFICE O/P NEW HI 60 MIN: CPT | Mod: 95

## 2021-10-08 ENCOUNTER — APPOINTMENT (OUTPATIENT)
Dept: HUMAN REPRODUCTION | Facility: CLINIC | Age: 36
End: 2021-10-08
Payer: COMMERCIAL

## 2021-10-08 PROCEDURE — 76830 TRANSVAGINAL US NON-OB: CPT

## 2021-10-08 PROCEDURE — 99213 OFFICE O/P EST LOW 20 MIN: CPT | Mod: 25

## 2021-10-08 PROCEDURE — 36415 COLL VENOUS BLD VENIPUNCTURE: CPT

## 2021-10-14 ENCOUNTER — RESULT REVIEW (OUTPATIENT)
Age: 36
End: 2021-10-14

## 2021-10-14 ENCOUNTER — APPOINTMENT (OUTPATIENT)
Dept: HUMAN REPRODUCTION | Facility: CLINIC | Age: 36
End: 2021-10-14
Payer: COMMERCIAL

## 2021-10-14 ENCOUNTER — APPOINTMENT (OUTPATIENT)
Dept: HUMAN REPRODUCTION | Facility: CLINIC | Age: 36
End: 2021-10-14

## 2021-10-14 ENCOUNTER — OUTPATIENT (OUTPATIENT)
Dept: OUTPATIENT SERVICES | Facility: HOSPITAL | Age: 36
LOS: 1 days | End: 2021-10-14
Payer: COMMERCIAL

## 2021-10-14 ENCOUNTER — APPOINTMENT (OUTPATIENT)
Dept: RADIOLOGY | Facility: HOSPITAL | Age: 36
End: 2021-10-14

## 2021-10-14 DIAGNOSIS — Z98.890 OTHER SPECIFIED POSTPROCEDURAL STATES: Chronic | ICD-10-CM

## 2021-10-14 DIAGNOSIS — N97.9 FEMALE INFERTILITY, UNSPECIFIED: ICD-10-CM

## 2021-10-14 PROCEDURE — 58340 CATHETER FOR HYSTEROGRAPHY: CPT

## 2021-10-14 PROCEDURE — 99214 OFFICE O/P EST MOD 30 MIN: CPT | Mod: 25

## 2021-10-14 PROCEDURE — 74740 X-RAY FEMALE GENITAL TRACT: CPT

## 2021-10-14 PROCEDURE — 74740 X-RAY FEMALE GENITAL TRACT: CPT | Mod: 26,59

## 2021-10-29 ENCOUNTER — APPOINTMENT (OUTPATIENT)
Dept: HUMAN REPRODUCTION | Facility: CLINIC | Age: 36
End: 2021-10-29
Payer: COMMERCIAL

## 2021-10-29 PROCEDURE — 99215 OFFICE O/P EST HI 40 MIN: CPT | Mod: 95

## 2021-11-12 ENCOUNTER — APPOINTMENT (OUTPATIENT)
Dept: HUMAN REPRODUCTION | Facility: CLINIC | Age: 36
End: 2021-11-12
Payer: COMMERCIAL

## 2021-11-12 PROCEDURE — 76830 TRANSVAGINAL US NON-OB: CPT

## 2021-11-12 PROCEDURE — 36415 COLL VENOUS BLD VENIPUNCTURE: CPT

## 2021-11-12 PROCEDURE — 99213 OFFICE O/P EST LOW 20 MIN: CPT | Mod: 25

## 2021-11-19 ENCOUNTER — APPOINTMENT (OUTPATIENT)
Dept: HUMAN REPRODUCTION | Facility: CLINIC | Age: 36
End: 2021-11-19
Payer: COMMERCIAL

## 2021-11-19 PROCEDURE — 36415 COLL VENOUS BLD VENIPUNCTURE: CPT

## 2021-11-19 PROCEDURE — 76830 TRANSVAGINAL US NON-OB: CPT

## 2021-11-19 PROCEDURE — 99213 OFFICE O/P EST LOW 20 MIN: CPT | Mod: 25

## 2021-11-23 ENCOUNTER — APPOINTMENT (OUTPATIENT)
Dept: HUMAN REPRODUCTION | Facility: CLINIC | Age: 36
End: 2021-11-23
Payer: COMMERCIAL

## 2021-11-23 PROCEDURE — 36415 COLL VENOUS BLD VENIPUNCTURE: CPT

## 2021-11-23 PROCEDURE — 76830 TRANSVAGINAL US NON-OB: CPT

## 2021-11-23 PROCEDURE — 99213 OFFICE O/P EST LOW 20 MIN: CPT | Mod: 25

## 2021-12-07 ENCOUNTER — APPOINTMENT (OUTPATIENT)
Dept: HUMAN REPRODUCTION | Facility: CLINIC | Age: 36
End: 2021-12-07
Payer: COMMERCIAL

## 2021-12-07 DIAGNOSIS — O20.0 THREATENED ABORTION: ICD-10-CM

## 2021-12-07 PROCEDURE — 99213 OFFICE O/P EST LOW 20 MIN: CPT | Mod: 25

## 2021-12-07 PROCEDURE — 76830 TRANSVAGINAL US NON-OB: CPT

## 2021-12-07 PROCEDURE — 36415 COLL VENOUS BLD VENIPUNCTURE: CPT

## 2022-02-09 ENCOUNTER — APPOINTMENT (OUTPATIENT)
Dept: OBGYN | Facility: CLINIC | Age: 37
End: 2022-02-09
Payer: COMMERCIAL

## 2022-02-09 ENCOUNTER — ASOB RESULT (OUTPATIENT)
Age: 37
End: 2022-02-09

## 2022-02-09 VITALS
HEART RATE: 108 BPM | BODY MASS INDEX: 20.49 KG/M2 | WEIGHT: 123 LBS | DIASTOLIC BLOOD PRESSURE: 79 MMHG | HEIGHT: 65 IN | SYSTOLIC BLOOD PRESSURE: 125 MMHG

## 2022-02-09 PROCEDURE — 76817 TRANSVAGINAL US OBSTETRIC: CPT

## 2022-02-09 PROCEDURE — 99214 OFFICE O/P EST MOD 30 MIN: CPT

## 2022-02-18 LAB
C TRACH RRNA SPEC QL NAA+PROBE: NOT DETECTED
N GONORRHOEA RRNA SPEC QL NAA+PROBE: NOT DETECTED
SOURCE AMPLIFICATION: NORMAL

## 2022-02-22 ENCOUNTER — APPOINTMENT (OUTPATIENT)
Dept: OBGYN | Facility: CLINIC | Age: 37
End: 2022-02-22
Payer: COMMERCIAL

## 2022-02-22 ENCOUNTER — ASOB RESULT (OUTPATIENT)
Age: 37
End: 2022-02-22

## 2022-02-22 PROCEDURE — 76817 TRANSVAGINAL US OBSTETRIC: CPT

## 2022-02-23 ENCOUNTER — APPOINTMENT (OUTPATIENT)
Dept: OBGYN | Facility: CLINIC | Age: 37
End: 2022-02-23
Payer: COMMERCIAL

## 2022-02-23 VITALS
HEART RATE: 94 BPM | DIASTOLIC BLOOD PRESSURE: 78 MMHG | WEIGHT: 123 LBS | HEIGHT: 65 IN | BODY MASS INDEX: 20.49 KG/M2 | SYSTOLIC BLOOD PRESSURE: 120 MMHG

## 2022-02-23 PROCEDURE — 99214 OFFICE O/P EST MOD 30 MIN: CPT

## 2022-02-26 LAB
ABO + RH PNL BLD: NORMAL
BACTERIA UR CULT: NORMAL
BLD GP AB SCN SERPL QL: NORMAL
HCG SERPL-MCNC: ABNORMAL MIU/ML

## 2022-03-09 ENCOUNTER — APPOINTMENT (OUTPATIENT)
Dept: OBGYN | Facility: CLINIC | Age: 37
End: 2022-03-09

## 2022-03-09 ENCOUNTER — ASOB RESULT (OUTPATIENT)
Age: 37
End: 2022-03-09

## 2022-03-09 ENCOUNTER — APPOINTMENT (OUTPATIENT)
Dept: OBGYN | Facility: CLINIC | Age: 37
End: 2022-03-09
Payer: COMMERCIAL

## 2022-03-09 PROCEDURE — 76801 OB US < 14 WKS SINGLE FETUS: CPT

## 2022-04-18 ENCOUNTER — APPOINTMENT (OUTPATIENT)
Dept: OBGYN | Facility: CLINIC | Age: 37
End: 2022-04-18
Payer: COMMERCIAL

## 2022-04-18 ENCOUNTER — NON-APPOINTMENT (OUTPATIENT)
Age: 37
End: 2022-04-18

## 2022-04-18 VITALS
DIASTOLIC BLOOD PRESSURE: 84 MMHG | SYSTOLIC BLOOD PRESSURE: 121 MMHG | BODY MASS INDEX: 20.33 KG/M2 | HEART RATE: 84 BPM | WEIGHT: 122 LBS | HEIGHT: 65 IN

## 2022-04-18 DIAGNOSIS — O02.1 MISSED ABORTION: ICD-10-CM

## 2022-04-18 DIAGNOSIS — Z87.42 PERSONAL HISTORY OF OTHER DISEASES OF THE FEMALE GENITAL TRACT: ICD-10-CM

## 2022-04-18 DIAGNOSIS — Z87.898 PERSONAL HISTORY OF OTHER SPECIFIED CONDITIONS: ICD-10-CM

## 2022-04-18 DIAGNOSIS — Z80.42 FAMILY HISTORY OF MALIGNANT NEOPLASM OF PROSTATE: ICD-10-CM

## 2022-04-18 DIAGNOSIS — Z80.8 FAMILY HISTORY OF MALIGNANT NEOPLASM OF OTHER ORGANS OR SYSTEMS: ICD-10-CM

## 2022-04-18 DIAGNOSIS — R10.9 UNSPECIFIED ABDOMINAL PAIN: ICD-10-CM

## 2022-04-18 DIAGNOSIS — R10.2 PELVIC AND PERINEAL PAIN: ICD-10-CM

## 2022-04-18 DIAGNOSIS — Z32.01 ENCOUNTER FOR PREGNANCY TEST, RESULT POSITIVE: ICD-10-CM

## 2022-04-18 PROBLEM — O20.0 ABORTION, THREATENED, EARLY PREGNANCY: Status: RESOLVED | Noted: 2022-02-23 | Resolved: 2022-04-18

## 2022-04-18 PROCEDURE — 99395 PREV VISIT EST AGE 18-39: CPT

## 2022-04-18 RX ORDER — PRENATAL VIT 49/IRON FUM/FOLIC 6.75-0.2MG
TABLET ORAL
Refills: 0 | Status: ACTIVE | COMMUNITY

## 2022-04-18 RX ORDER — NITROFURANTOIN (MONOHYDRATE/MACROCRYSTALS) 25; 75 MG/1; MG/1
100 CAPSULE ORAL TWICE DAILY
Qty: 14 | Refills: 0 | Status: DISCONTINUED | COMMUNITY
Start: 2022-02-23 | End: 2022-04-18

## 2022-04-18 RX ORDER — IRON/IRON ASP GLY/FA/MV-MIN 38 125-25-1MG
TABLET ORAL
Refills: 0 | Status: ACTIVE | COMMUNITY

## 2022-04-19 LAB
C TRACH RRNA SPEC QL NAA+PROBE: NOT DETECTED
HPV HIGH+LOW RISK DNA PNL CVX: NOT DETECTED
N GONORRHOEA RRNA SPEC QL NAA+PROBE: NOT DETECTED
SOURCE AMPLIFICATION: NORMAL

## 2022-04-25 LAB — CYTOLOGY CVX/VAG DOC THIN PREP: NORMAL

## 2022-05-23 NOTE — ASU PATIENT PROFILE, ADULT - PRO ARRIVE FROM
Patient Education        Gastroesophageal Reflux in Children: Care Instructions  Overview     Gastroesophageal reflux occurs when stomach acids back up into the esophagus. This is the tube that takes food from the throat to the stomach. Reflux cancause pain and swelling in the esophagus. Reflux can happen when the area between the lower end of the esophagus and the stomach does not close tightly. In babies, it usually happens because their digestive tracts are still growing. In older children, there may be othercauses. Reflux can cause babies to vomit, cry, and act fussy. They may have trouble breastfeeding or taking a bottle. Most of the time, reflux is not a sign of aserious problem. It often goes away by the end of a baby's first year. Older children sometimes have gastroesophageal reflux disease (GERD). They may have the same symptoms as adults. They may cough a lot. And they may have a burning feeling in the chest and throat. Symptoms may go away with care at homeor medicines. Follow-up care is a key part of your child's treatment and safety. Be sure to make and go to all appointments, and call your doctor if your child is having problems. It's also a good idea to know your child's test results andkeep a list of the medicines your child takes. How can you care for your child at home? Infants   Burp your baby several times during a feeding.  Hold your baby upright for 30 minutes after a feeding. Older children   Raise the head of your child's bed 6 to 8 inches. To do this, put blocks under the frame. Or you can put a foam wedge under the head of the mattress.  Have your child eat smaller meals, more often.  Avoid foods that make your child's symptoms worse. These may include chocolate, mint, alcohol, pepper, spicy foods, high-fat foods, or drinks with caffeine in them, such as tea, coffee, ze, or energy drinks.  Try to feed your child at least 2 to 3 hours before bedtime.  This helps lower home the amount of acid in the stomach when your child lies down.  Be safe with medicines. Have your child take medicines exactly as prescribed. Call your doctor if you think your child is having a problem with a medicine.  Antacids such as children's versions of Rolaids, Tums, or Maalox may help. Be careful when you give your child over-the-counter antacid medicines. Many of these medicines have aspirin in them. Do not give aspirin to anyone younger than 20. It has been linked to Reye syndrome, a serious illness.  Your doctor may recommend over-the-counter acid reducers. These are medicines such as cimetidine (Tagamet HB), famotidine (Pepcid AC), or omeprazole (Prilosec). When should you call for help? Call your doctor now or seek immediate medical care if:     Your child's vomit is very forceful or yellow-green in color.      Your child has signs of needing more fluids. These signs include sunken eyes with few tears, a dry mouth with little or no spit, and little or no urine for 6 hours. Watch closely for changes in your child's health, and be sure to contact yourdoctor if:     Your child does not get better as expected. Where can you learn more? Go to https://Genus Oncologypepiceweb.'Rock[employer]. org and sign in to your [employer] account. Enter L132 in the [facility] box to learn more about \"Gastroesophageal Reflux in Children: Care Instructions. \"     If you do not have an account, please click on the \"Sign Up Now\" link. Current as of: September 8, 2021               Content Version: 13.2  © 2006-2022 Healthwise, Incorporated. Care instructions adapted under license by [facility] ([facility]). If you have questions about a medical condition or this instruction, always ask your healthcare professional. [place] [address] any warranty or liability for your use of this information.          Patient Education        Carpal Tunnel Syndrome: Exercises  Introduction  Here are some examples of exercises for you to try. The exercises may be suggested for a condition or for rehabilitation. Start each exercise slowly. Ease off the exercises if you start to have pain. You will be told when to start these exercises and which ones will work bestfor you. Warm-up stretches  When you no longer have pain or numbness, you can do exercises to help prevent carpal tunnel syndrome from coming back. Do not do any stretch or movement thatis uncomfortable or painful. 1. Rotate your wrist up, down, and from side to side. Repeat 4 times. 2. Stretch your fingers far apart. Relax them, and then stretch them again. Repeat 4 times. 3. Stretch your thumb by pulling it back gently, holding it, and then releasing it. Repeat 4 times. How to do the exercises  Prayer stretch    1. Start with your palms together in front of your chest just below your chin. 2. Slowly lower your hands toward your waistline, keeping your hands close to your stomach and your palms together until you feel a mild to moderate stretch under your forearms. 3. Hold for at least 15 to 30 seconds. Repeat 2 to 4 times. Wrist flexor stretch    1. Extend your arm in front of you with your palm up. 2. Bend your wrist, pointing your hand toward the floor. 3. With your other hand, gently bend your wrist farther until you feel a mild to moderate stretch in your forearm. 4. Hold for at least 15 to 30 seconds. Repeat 2 to 4 times. Wrist extensor stretch    1. Repeat steps 1 through 4 of the stretch above, but begin with your extended hand palm down. Follow-up care is a key part of your treatment and safety. Be sure to make and go to all appointments, and call your doctor if you are having problems. It's also a good idea to know your test results and keep alist of the medicines you take. Where can you learn more? Go to https://estelita.[employer]. org and sign in to your [employer] account.  Enter V034 in the [employer]My[employer] box to learn more about \"Carpal Tunnel Syndrome: Exercises. \"     If you do not have an account, please click on the \"Sign Up Now\" link. Current as of: July 1, 2021               Content Version: 13.2  © 2006-2022 Healthwise, Incorporated. Care instructions adapted under license by [facility] ([facility]). If you have questions about a medical condition or this instruction, always ask your healthcare professional. [address] any warranty or liability for your use of this information.

## 2022-08-22 NOTE — H&P PST ADULT - PHARYNX
Cardiac Electrophysiology Outpatient Follow Up Note            [facility]    Follow Up Office Visit      Ivory Carr  [id number]  08/22/2022  [unfilled]  [unfilled]    Primary Care Physician: Christiane Peter APRN    Referred By: No ref. provider found    Subjective     Chief Complaint:   Diagnoses and all orders for this visit:    1. Paroxysmal atrial fibrillation (HCC) (Primary)    2. Atrial flutter, unspecified type (HCC)    3. Severe tricuspid regurgitation    4. Essential hypertension    5. ASD (atrial septal defect)    6. Typical atrial flutter (HCC)    7. Iron deficiency anemia due to chronic blood loss      Chief Complaint   Patient presents with   • Atrial flutter, unspecified type (HCC)       History of Present Illness:   Ivory Carr is a 81 y.o. female who presents to my electrophysiology clinic for follow up of the above complaints and especially after heart failure hospitalization visit where she was diuresed many liters.    Maribell continues to eat sodium.  She enjoys it with tomatoes.  There is not much in the way of sodium restriction for her.    No chest pain nausea vomit fevers or chills.  Little bit of lower extremity edema today..      Review of Systems:   Constitutional: No fevers or chills, no recent weight gain or weight loss or fatigue  Eyes: No visual loss, blurred vision, double vision, yellow sclerae.  ENT: No headaches, hearing loss, vertigo, congestion or sore throat.   Cardiovascular: Per HPI  Respiratory: No cough or wheezing, no sputum production, no hematemesis   Gastrointestinal: No abdominal pain, no nausea, vomiting, constipation, diarrhea, melena.   Genitourinary: No dysuria, hematuria or increased frequency.      Past Medical History:   Past Medical History:   Diagnosis Date   • ASD (atrial septal defect)    • Atrial flutter (HCC) 9/24/2020    Added automatically from request for surgery [id number]   • CKD (chronic 
kidney disease) 10/6/2020   • Diabetes (HCC)        Past Surgical History:   Past Surgical History:   Procedure Laterality Date   • CARDIAC CATHETERIZATION N/A 10/9/2020    Procedure: Right Heart Cath;  Surgeon: Irvin Mccray MD;  Location:  [facility];  Service: Cardiology;  Laterality: N/A;   • CARDIAC ELECTROPHYSIOLOGY PROCEDURE N/A 10/15/2020    Procedure: Ablation atrial flutter;  Surgeon: Joseph Garcia DO;  Location:  [facility] EP INVASIVE LOC[facility];  Service: Cardiovascular;  Laterality: N/A;   • CHOLECYSTECTOMY         Family History:   Family History   Problem Relation Age of Onset   • Hypertension Mother    • Hyperlipidemia Mother    • Diabetes Mother    • Emphysema Father    • Other Son         HEART PROBLEMS   • Diabetes Sister    • Other Brother         HEART PROBLEMS   • Lung disease Sister    • No Known Problems Sister    • Diabetes Sister    • Diabetes Sister    • Other Brother         HEART PROBLEMS   • Other Brother         HEART PROBLEMS   • Other Brother         HEART PROBLEMS   • Other Brother         HEART PROBLEMS       Social History:   Social History     Socioeconomic History   • Marital status:    Tobacco Use   • Smoking status: Never Smoker   • Smokeless tobacco: Never Used   Substance and Sexual Activity   • Alcohol use: Never   • Drug use: Never   • Sexual activity: Defer       Medications:     Current Outpatient Medications:   •  bumetanide (BUMEX) 1 MG tablet, Take 1 tablet by mouth 2 (Two) Times a Day., Disp: 60 tablet, Rfl: 1  •  colchicine 0.6 MG tablet, Take 1 tablet by mouth Daily., Disp: 7 tablet, Rfl: 0  •  Jardiance 25 MG tablet tablet, Take 1 tablet by mouth Daily., Disp: , Rfl:   •  metoprolol tartrate (LOPRESSOR) 25 MG tablet, Take 1 tablet by mouth 2 (Two) Times a Day., Disp: 60 tablet, Rfl: 11  •  NovoLIN 70/30 (70-30) 100 UNIT/ML injection, Inject 20 Units under the skin into the appropriate area as directed 3 (Three) Times a Day., Disp: , Rfl: 
"  •  rivaroxaban (XARELTO) 15 MG tablet, Take 1 tablet by mouth Daily., Disp: 30 tablet, Rfl: 1    Allergies:   Allergies   Allergen Reactions   • Statins Myalgia       Objective   Vital Signs:   Vitals:    08/22/22 1331   BP: 136/66   BP Location: Left arm   Patient Position: Sitting   Pulse: 73   SpO2: 96%   Weight: 80.3 kg (177 lb)   Height: 170.2 cm (67\")       PHYSICAL EXAM  General appearance: Awake, alert, cooperative  Head: Normocephalic, without obvious abnormality, atraumatic  Eyes: Conjunctivae/corneas clear, EOMs intact  Neck: no adenopathy, no carotid bruit, no JVD and thyroid: not enlarged  Lungs: clear to auscultation bilaterally and no rhonchi or crackles\", ' symmetric  Heart: regular rate and rhythm, S1, S2 normal, no murmur, click, rub or gallop  Abdomen: Soft, non-tender, bowel sounds normal,  no organomegaly  Extremities: extremities normal, atraumatic, no cyanosis or edema  Skin: Skin color, turgor normal, no rashes or lesions  Neurologic: Grossly normal     Lab Results   Component Value Date    GLUCOSE 210 (H) 07/22/2022    CALCIUM 9.2 07/22/2022     (L) 07/22/2022    K 4.4 07/22/2022    CO2 34.0 (H) 07/22/2022    CL 93 (L) 07/22/2022    BUN 21 07/22/2022    CREATININE 1.61 (H) 07/22/2022    EGFRIFNONA 57 (L) 12/22/2021    BCR 13.0 07/22/2022    ANIONGAP 7.0 07/22/2022     Lab Results   Component Value Date    WBC 4.46 07/15/2022    HGB 10.8 (L) 07/15/2022    HCT 34.7 07/15/2022    MCV 85.5 07/15/2022     07/15/2022     Lab Results   Component Value Date    INR 2.03 (H) 07/11/2022    INR 2.20 (H) 10/06/2020    PROTIME 22.9 (H) 07/11/2022    PROTIME 24.2 (H) 10/06/2020     Lab Results   Component Value Date    TSH 3.730 07/11/2022       Cardiac Testing:     I personally viewed and interpreted the patient's EKG/Telemetry/lab data    Procedures    Tobacco Cessation: N/A  Obstructive Sleep Apnea Screening: Completed    Assessment & Plan    Diagnoses and all orders for this visit:    1. "
Paroxysmal atrial fibrillation (HCC) (Primary)    2. Atrial flutter, unspecified type (HCC)    3. Severe tricuspid regurgitation    4. Essential hypertension    5. ASD (atrial septal defect)    6. Typical atrial flutter (HCC)    7. Iron deficiency anemia due to chronic blood loss         Diagnosis Plan   1. Paroxysmal atrial fibrillation (HCC)   asymptomatic.    Paroxysmal    Anticoagulated appropriate dose.    No symptoms whatsoever.    No need to pursue rhythm control strategy.   2. Atrial flutter, unspecified type (HCC)   typical atrial flutter.  Ablated.  No recurrence.   3. Severe tricuspid regurgitation   bordering on moderate to severe versus severe.    Salt restriction is critical.    Very so slightly hypervolemic at this time but not worse diuresing.  Much tension focus today on sodium restriction.    We will have her meet with Dr. Henning, to establish care as her cardiologist.  I will continue to manage her heart rhythm issues.   4. Essential hypertension   blood pressure well controlled.   5. ASD (atrial septal defect)   remote surgical ASD repair.   6. Typical atrial flutter (HCC)   ablated.  No recurrence.   7. Iron deficiency anemia due to chronic blood loss   doing well on Xarelto at this point.     Body mass index is 27.72 kg/m².    I spent 42 minutes in consultation with this patient which included more than 65% of this time in direct face-to-face counseling, physical examination and discussion of my assessment and findings and shared decision making with the patient.  The remainder of the time not spent face to face was performing one, some or all of the following actions:  preparing to see this patient ( eg. Review of tests),  ordering medications, tests or procedures ), care coordination, discussion of the plan with other healthcare providers, documenting clinical information in Epic well as independently interpreting results and communicating results to patient, family and or caregiver.  All 
time noted occurred on the date of service.    Follow Up:       Thank you for allowing me to participate in the care of your patient. Please to not hesitate to contact me with additional questions or concerns.      Joseph Garcia DO, FACC, [facility]  Cardiac Electrophysiologist  [facility]            
Fall with Harm Risk
no discharge/no redness

## 2023-01-24 ENCOUNTER — APPOINTMENT (OUTPATIENT)
Dept: OBGYN | Facility: CLINIC | Age: 38
End: 2023-01-24
Payer: COMMERCIAL

## 2023-01-24 ENCOUNTER — ASOB RESULT (OUTPATIENT)
Age: 38
End: 2023-01-24

## 2023-01-24 VITALS
HEIGHT: 65 IN | BODY MASS INDEX: 19.49 KG/M2 | SYSTOLIC BLOOD PRESSURE: 129 MMHG | DIASTOLIC BLOOD PRESSURE: 78 MMHG | WEIGHT: 117 LBS

## 2023-01-24 DIAGNOSIS — L53.9 ERYTHEMATOUS CONDITION, UNSPECIFIED: ICD-10-CM

## 2023-01-24 DIAGNOSIS — N92.6 IRREGULAR MENSTRUATION, UNSPECIFIED: ICD-10-CM

## 2023-01-24 PROCEDURE — 99213 OFFICE O/P EST LOW 20 MIN: CPT

## 2023-01-24 PROCEDURE — 76817 TRANSVAGINAL US OBSTETRIC: CPT

## 2023-01-26 ENCOUNTER — APPOINTMENT (OUTPATIENT)
Dept: OBGYN | Facility: CLINIC | Age: 38
End: 2023-01-26

## 2023-01-27 ENCOUNTER — NON-APPOINTMENT (OUTPATIENT)
Age: 38
End: 2023-01-27

## 2023-01-27 ENCOUNTER — APPOINTMENT (OUTPATIENT)
Dept: OBGYN | Facility: CLINIC | Age: 38
End: 2023-01-27
Payer: COMMERCIAL

## 2023-01-27 ENCOUNTER — APPOINTMENT (OUTPATIENT)
Dept: OBGYN | Facility: CLINIC | Age: 38
End: 2023-01-27

## 2023-01-27 VITALS
WEIGHT: 117.5 LBS | HEIGHT: 65 IN | DIASTOLIC BLOOD PRESSURE: 70 MMHG | SYSTOLIC BLOOD PRESSURE: 108 MMHG | BODY MASS INDEX: 19.58 KG/M2

## 2023-01-27 DIAGNOSIS — Z34.90 ENCOUNTER FOR SUPERVISION OF NORMAL PREGNANCY, UNSPECIFIED, UNSPECIFIED TRIMESTER: ICD-10-CM

## 2023-01-27 PROCEDURE — 99212 OFFICE O/P EST SF 10 MIN: CPT

## 2023-02-01 ENCOUNTER — APPOINTMENT (OUTPATIENT)
Dept: OBGYN | Facility: CLINIC | Age: 38
End: 2023-02-01

## 2023-02-09 LAB
ABO + RH PNL BLD: NORMAL
BACTERIA UR CULT: NORMAL
BLD GP AB SCN SERPL QL: NORMAL
CANDIDA VAG CYTO: NOT DETECTED
G VAGINALIS+PREV SP MTYP VAG QL MICRO: NOT DETECTED
HCG SERPL-MCNC: 1657 MIU/ML
HCG SERPL-MCNC: 9326 MIU/ML
HCG SERPL-MCNC: 954 MIU/ML
T VAGINALIS VAG QL WET PREP: NOT DETECTED

## 2023-02-13 ENCOUNTER — ASOB RESULT (OUTPATIENT)
Age: 38
End: 2023-02-13

## 2023-02-13 ENCOUNTER — LABORATORY RESULT (OUTPATIENT)
Age: 38
End: 2023-02-13

## 2023-02-13 ENCOUNTER — APPOINTMENT (OUTPATIENT)
Dept: OBGYN | Facility: CLINIC | Age: 38
End: 2023-02-13
Payer: COMMERCIAL

## 2023-02-13 VITALS
HEART RATE: 112 BPM | DIASTOLIC BLOOD PRESSURE: 76 MMHG | BODY MASS INDEX: 20.33 KG/M2 | HEIGHT: 65 IN | SYSTOLIC BLOOD PRESSURE: 111 MMHG | WEIGHT: 122 LBS

## 2023-02-13 DIAGNOSIS — Z34.90 ENCOUNTER FOR SUPERVISION OF NORMAL PREGNANCY, UNSPECIFIED, UNSPECIFIED TRIMESTER: ICD-10-CM

## 2023-02-13 DIAGNOSIS — N94.9 UNSPECIFIED CONDITION ASSOCIATED WITH FEMALE GENITAL ORGANS AND MENSTRUAL CYCLE: ICD-10-CM

## 2023-02-13 PROCEDURE — 76817 TRANSVAGINAL US OBSTETRIC: CPT

## 2023-02-13 PROCEDURE — 0500F INITIAL PRENATAL CARE VISIT: CPT

## 2023-02-16 ENCOUNTER — NON-APPOINTMENT (OUTPATIENT)
Age: 38
End: 2023-02-16

## 2023-02-18 LAB
ABO + RH PNL BLD: NORMAL
AR GENE MUT ANL BLD/T: NORMAL
BASOPHILS # BLD AUTO: 0.06 K/UL
BASOPHILS NFR BLD AUTO: 0.6 %
BLD GP AB SCN SERPL QL: NORMAL
C TRACH RRNA SPEC QL NAA+PROBE: NOT DETECTED
CANDIDA VAG CYTO: NOT DETECTED
CMV IGG SERPL QL: 6.6 U/ML
CMV IGG SERPL-IMP: POSITIVE
CMV IGM SERPL QL: <8 AU/ML
CMV IGM SERPL QL: NEGATIVE
EOSINOPHIL # BLD AUTO: 0.08 K/UL
EOSINOPHIL NFR BLD AUTO: 0.9 %
FMR1 GENE MUT ANL BLD/T: NORMAL
G VAGINALIS+PREV SP MTYP VAG QL MICRO: NOT DETECTED
HBV SURFACE AG SER QL: NONREACTIVE
HCT VFR BLD CALC: 38.4 %
HGB A MFR BLD: 97.7 %
HGB A2 MFR BLD: 2.3 %
HGB BLD-MCNC: 11.9 G/DL
HGB FRACT BLD-IMP: NORMAL
HIV1+2 AB SPEC QL IA.RAPID: NONREACTIVE
IMM GRANULOCYTES NFR BLD AUTO: 0.2 %
LYMPHOCYTES # BLD AUTO: 2.63 K/UL
LYMPHOCYTES NFR BLD AUTO: 28.4 %
MAN DIFF?: NORMAL
MCHC RBC-ENTMCNC: 28.9 PG
MCHC RBC-ENTMCNC: 31 GM/DL
MCV RBC AUTO: 93.2 FL
MEV IGG FLD QL IA: 94 AU/ML
MEV IGG+IGM SER-IMP: POSITIVE
MEV IGM SER QL: <0.91 ISR
MONOCYTES # BLD AUTO: 0.59 K/UL
MONOCYTES NFR BLD AUTO: 6.4 %
N GONORRHOEA RRNA SPEC QL NAA+PROBE: NOT DETECTED
NEUTROPHILS # BLD AUTO: 5.88 K/UL
NEUTROPHILS NFR BLD AUTO: 63.5 %
PLATELET # BLD AUTO: 200 K/UL
RBC # BLD: 4.12 M/UL
RBC # FLD: 13.7 %
RUBV IGG FLD-ACNC: 11.8 INDEX
RUBV IGG SER-IMP: POSITIVE
SOURCE AMPLIFICATION: NORMAL
T GONDII AB SER-IMP: NEGATIVE
T GONDII AB SER-IMP: POSITIVE
T GONDII IGG SER QL: 42.2 IU/ML
T GONDII IGM SER QL: <3 AU/ML
T PALLIDUM AB SER QL IA: NEGATIVE
T VAGINALIS VAG QL WET PREP: NOT DETECTED
T4 FREE SERPL-MCNC: 1.5 NG/DL
TSH SERPL-ACNC: 1.51 UIU/ML
VZV AB TITR SER: NORMAL
VZV IGG SER IF-ACNC: 162.6 INDEX
WBC # FLD AUTO: 9.26 K/UL

## 2023-02-20 LAB
B19V IGG SER QL IA: 4.35 INDEX
B19V IGG+IGM SER-IMP: NORMAL
B19V IGG+IGM SER-IMP: POSITIVE
B19V IGM FLD-ACNC: 0.24 INDEX
B19V IGM SER-ACNC: NEGATIVE
CFTR MUT TESTED BLD/T: NEGATIVE

## 2023-02-24 LAB — LEAD BLD-MCNC: <1 UG/DL

## 2023-03-06 ENCOUNTER — APPOINTMENT (OUTPATIENT)
Dept: PEDIATRIC MEDICAL GENETICS | Facility: CLINIC | Age: 38
End: 2023-03-06
Payer: COMMERCIAL

## 2023-03-06 DIAGNOSIS — Z82.3 FAMILY HISTORY OF STROKE: ICD-10-CM

## 2023-03-06 DIAGNOSIS — Z83.42 FAMILY HISTORY OF FAMILIAL HYPERCHOLESTEROLEMIA: ICD-10-CM

## 2023-03-06 DIAGNOSIS — Z83.3 FAMILY HISTORY OF DIABETES MELLITUS: ICD-10-CM

## 2023-03-06 DIAGNOSIS — O09.521 SUPERVISION OF ELDERLY MULTIGRAVIDA, FIRST TRIMESTER: ICD-10-CM

## 2023-03-06 PROCEDURE — 96040: CPT | Mod: 95

## 2023-03-06 NOTE — HISTORY OF PRESENT ILLNESS
[Home] : at home, [unfilled] , at the time of the visit. [Medical Office: ([facility])___] : at the medical office located in  [Spouse] : spouse [Verbal consent obtained from patient] : the patient, [unfilled]

## 2023-03-15 ENCOUNTER — ASOB RESULT (OUTPATIENT)
Age: 38
End: 2023-03-15

## 2023-03-15 ENCOUNTER — NON-APPOINTMENT (OUTPATIENT)
Age: 38
End: 2023-03-15

## 2023-03-15 ENCOUNTER — APPOINTMENT (OUTPATIENT)
Dept: OBGYN | Facility: CLINIC | Age: 38
End: 2023-03-15
Payer: COMMERCIAL

## 2023-03-15 ENCOUNTER — APPOINTMENT (OUTPATIENT)
Dept: ANTEPARTUM | Facility: CLINIC | Age: 38
End: 2023-03-15
Payer: COMMERCIAL

## 2023-03-15 VITALS
SYSTOLIC BLOOD PRESSURE: 114 MMHG | HEART RATE: 103 BPM | DIASTOLIC BLOOD PRESSURE: 74 MMHG | WEIGHT: 120.7 LBS | HEIGHT: 65 IN | BODY MASS INDEX: 20.11 KG/M2

## 2023-03-15 PROCEDURE — 76801 OB US < 14 WKS SINGLE FETUS: CPT

## 2023-03-15 PROCEDURE — 0502F SUBSEQUENT PRENATAL CARE: CPT

## 2023-03-15 PROCEDURE — 76813 OB US NUCHAL MEAS 1 GEST: CPT

## 2023-03-18 PROBLEM — Z34.90 EARLY STAGE OF PREGNANCY: Status: ACTIVE | Noted: 2022-04-27

## 2023-03-18 NOTE — HISTORY OF PRESENT ILLNESS
[FreeTextEntry1] : This pt is a pleasant 37 year old female who presents for followup of early stage of pregnancy. She had TVS which revealed  IUP. She had some bleeding so beta HCG was obtained . Discussed that this value was rising appropriately and she should continue plan to have repeat TVS, hopefully see viable IUP and initiate prenatal care. All of her questions were answered to her apparent satisfaction.

## 2023-03-26 PROBLEM — N92.6 MISSED MENSES: Status: ACTIVE | Noted: 2022-04-27

## 2023-03-26 NOTE — HISTORY OF PRESENT ILLNESS
[FreeTextEntry1] : Please note that although this note was electronically documented on a date later than the date of the patient's office visit, the information that was documented electronically was obtained from a hand written paper note which was documented on the day of the patient's office visit. \par \par This pt is a pleasant 37 year old female who presents with missed menses. LMP 12/10/22. The pt has a history of three pregnancy losses and wanted to know as soon as she could that she was pregnant and get into care.

## 2023-03-30 ENCOUNTER — NON-APPOINTMENT (OUTPATIENT)
Age: 38
End: 2023-03-30

## 2023-04-10 ENCOUNTER — APPOINTMENT (OUTPATIENT)
Dept: OBGYN | Facility: CLINIC | Age: 38
End: 2023-04-10
Payer: COMMERCIAL

## 2023-04-10 VITALS
BODY MASS INDEX: 21.02 KG/M2 | DIASTOLIC BLOOD PRESSURE: 74 MMHG | HEIGHT: 65 IN | SYSTOLIC BLOOD PRESSURE: 115 MMHG | WEIGHT: 126.19 LBS | HEART RATE: 114 BPM

## 2023-04-10 DIAGNOSIS — R63.2 POLYPHAGIA: ICD-10-CM

## 2023-04-10 PROCEDURE — 0502F SUBSEQUENT PRENATAL CARE: CPT

## 2023-04-24 ENCOUNTER — TRANSCRIPTION ENCOUNTER (OUTPATIENT)
Age: 38
End: 2023-04-24

## 2023-04-25 LAB
AFP MOM: 0.68
AFP VALUE: 25.4 NG/ML
ALPHA FETOPROTEIN SERUM COMMENT: NORMAL
ALPHA FETOPROTEIN SERUM INTERPRETATION: NORMAL
ALPHA FETOPROTEIN SERUM RESULTS: NORMAL
ALPHA FETOPROTEIN SERUM TEST RESULTS: NORMAL
GESTATIONAL AGE BASED ON: NORMAL
GESTATIONAL AGE ON COLLECTION DATE: 15.7 WEEKS
GLUCOSE 1H P 50 G GLC PO SERPL-MCNC: 118 MG/DL
INSULIN DEP DIABETES: NO
MATERNAL AGE AT EDD AFP: 36.4 YR
MULTIPLE GESTATION: NO
OSBR RISK 1 IN: NORMAL
RACE: NORMAL
WEIGHT AFP: 126 LBS

## 2023-05-08 ENCOUNTER — APPOINTMENT (OUTPATIENT)
Dept: OBGYN | Facility: CLINIC | Age: 38
End: 2023-05-08
Payer: COMMERCIAL

## 2023-05-08 VITALS
DIASTOLIC BLOOD PRESSURE: 73 MMHG | SYSTOLIC BLOOD PRESSURE: 121 MMHG | WEIGHT: 129 LBS | BODY MASS INDEX: 21.49 KG/M2 | HEART RATE: 112 BPM | HEIGHT: 65 IN

## 2023-05-08 PROCEDURE — 0502F SUBSEQUENT PRENATAL CARE: CPT

## 2023-05-11 ENCOUNTER — ASOB RESULT (OUTPATIENT)
Age: 38
End: 2023-05-11

## 2023-05-11 ENCOUNTER — APPOINTMENT (OUTPATIENT)
Dept: ANTEPARTUM | Facility: CLINIC | Age: 38
End: 2023-05-11
Payer: COMMERCIAL

## 2023-05-11 ENCOUNTER — NON-APPOINTMENT (OUTPATIENT)
Age: 38
End: 2023-05-11

## 2023-05-11 PROCEDURE — 76811 OB US DETAILED SNGL FETUS: CPT

## 2023-05-26 ENCOUNTER — NON-APPOINTMENT (OUTPATIENT)
Age: 38
End: 2023-05-26

## 2023-06-05 ENCOUNTER — APPOINTMENT (OUTPATIENT)
Dept: OBGYN | Facility: CLINIC | Age: 38
End: 2023-06-05
Payer: COMMERCIAL

## 2023-06-05 VITALS
HEIGHT: 65 IN | WEIGHT: 129 LBS | SYSTOLIC BLOOD PRESSURE: 126 MMHG | BODY MASS INDEX: 21.49 KG/M2 | DIASTOLIC BLOOD PRESSURE: 79 MMHG | HEART RATE: 123 BPM

## 2023-06-05 PROCEDURE — 0502F SUBSEQUENT PRENATAL CARE: CPT

## 2023-06-08 LAB — GLUCOSE 1H P 50 G GLC PO SERPL-MCNC: 134 MG/DL

## 2023-06-20 ENCOUNTER — OUTPATIENT (OUTPATIENT)
Dept: INPATIENT UNIT | Facility: HOSPITAL | Age: 38
LOS: 1 days | Discharge: HOME | End: 2023-06-20
Payer: COMMERCIAL

## 2023-06-20 ENCOUNTER — NON-APPOINTMENT (OUTPATIENT)
Age: 38
End: 2023-06-20

## 2023-06-20 VITALS
HEART RATE: 115 BPM | TEMPERATURE: 99 F | SYSTOLIC BLOOD PRESSURE: 114 MMHG | RESPIRATION RATE: 15 BRPM | DIASTOLIC BLOOD PRESSURE: 66 MMHG

## 2023-06-20 VITALS — SYSTOLIC BLOOD PRESSURE: 103 MMHG | HEART RATE: 114 BPM | DIASTOLIC BLOOD PRESSURE: 66 MMHG

## 2023-06-20 DIAGNOSIS — Z98.890 OTHER SPECIFIED POSTPROCEDURAL STATES: Chronic | ICD-10-CM

## 2023-06-20 DIAGNOSIS — O26.899 OTHER SPECIFIED PREGNANCY RELATED CONDITIONS, UNSPECIFIED TRIMESTER: ICD-10-CM

## 2023-06-20 LAB
ALBUMIN SERPL ELPH-MCNC: 3.8 G/DL — SIGNIFICANT CHANGE UP (ref 3.3–5)
ALP SERPL-CCNC: 89 U/L — SIGNIFICANT CHANGE UP (ref 40–120)
ALT FLD-CCNC: 74 U/L — HIGH (ref 4–33)
AMYLASE P1 CFR SERPL: 55 U/L — SIGNIFICANT CHANGE UP (ref 25–125)
ANION GAP SERPL CALC-SCNC: 14 MMOL/L — SIGNIFICANT CHANGE UP (ref 7–14)
APPEARANCE UR: CLEAR — SIGNIFICANT CHANGE UP
AST SERPL-CCNC: 44 U/L — HIGH (ref 4–32)
B PERT DNA SPEC QL NAA+PROBE: SIGNIFICANT CHANGE UP
B PERT+PARAPERT DNA PNL SPEC NAA+PROBE: SIGNIFICANT CHANGE UP
BACTERIA # UR AUTO: NEGATIVE — SIGNIFICANT CHANGE UP
BASOPHILS # BLD AUTO: 0.04 K/UL — SIGNIFICANT CHANGE UP (ref 0–0.2)
BASOPHILS NFR BLD AUTO: 0.5 % — SIGNIFICANT CHANGE UP (ref 0–2)
BILIRUB SERPL-MCNC: 0.2 MG/DL — SIGNIFICANT CHANGE UP (ref 0.2–1.2)
BILIRUB UR-MCNC: NEGATIVE — SIGNIFICANT CHANGE UP
BORDETELLA PARAPERTUSSIS (RAPRVP): SIGNIFICANT CHANGE UP
BUN SERPL-MCNC: 9 MG/DL — SIGNIFICANT CHANGE UP (ref 7–23)
C PNEUM DNA SPEC QL NAA+PROBE: SIGNIFICANT CHANGE UP
CALCIUM SERPL-MCNC: 9.1 MG/DL — SIGNIFICANT CHANGE UP (ref 8.4–10.5)
CHLORIDE SERPL-SCNC: 103 MMOL/L — SIGNIFICANT CHANGE UP (ref 98–107)
CO2 SERPL-SCNC: 20 MMOL/L — LOW (ref 22–31)
COLOR SPEC: YELLOW — SIGNIFICANT CHANGE UP
CREAT SERPL-MCNC: 0.47 MG/DL — LOW (ref 0.5–1.3)
DIFF PNL FLD: NEGATIVE — SIGNIFICANT CHANGE UP
EGFR: 126 ML/MIN/1.73M2 — SIGNIFICANT CHANGE UP
EOSINOPHIL # BLD AUTO: 0.1 K/UL — SIGNIFICANT CHANGE UP (ref 0–0.5)
EOSINOPHIL NFR BLD AUTO: 1.2 % — SIGNIFICANT CHANGE UP (ref 0–6)
EPI CELLS # UR: 3 /HPF — SIGNIFICANT CHANGE UP (ref 0–5)
FLUAV SUBTYP SPEC NAA+PROBE: SIGNIFICANT CHANGE UP
FLUBV RNA SPEC QL NAA+PROBE: SIGNIFICANT CHANGE UP
GLUCOSE SERPL-MCNC: 109 MG/DL — HIGH (ref 70–99)
GLUCOSE UR QL: NEGATIVE — SIGNIFICANT CHANGE UP
HADV DNA SPEC QL NAA+PROBE: SIGNIFICANT CHANGE UP
HCOV 229E RNA SPEC QL NAA+PROBE: SIGNIFICANT CHANGE UP
HCOV HKU1 RNA SPEC QL NAA+PROBE: SIGNIFICANT CHANGE UP
HCOV NL63 RNA SPEC QL NAA+PROBE: SIGNIFICANT CHANGE UP
HCOV OC43 RNA SPEC QL NAA+PROBE: SIGNIFICANT CHANGE UP
HCT VFR BLD CALC: 29.9 % — LOW (ref 34.5–45)
HGB BLD-MCNC: 9.7 G/DL — LOW (ref 11.5–15.5)
HMPV RNA SPEC QL NAA+PROBE: SIGNIFICANT CHANGE UP
HPIV1 RNA SPEC QL NAA+PROBE: SIGNIFICANT CHANGE UP
HPIV2 RNA SPEC QL NAA+PROBE: SIGNIFICANT CHANGE UP
HPIV3 RNA SPEC QL NAA+PROBE: SIGNIFICANT CHANGE UP
HPIV4 RNA SPEC QL NAA+PROBE: SIGNIFICANT CHANGE UP
HYALINE CASTS # UR AUTO: 10 /LPF — HIGH (ref 0–7)
IANC: 5.64 K/UL — SIGNIFICANT CHANGE UP (ref 1.8–7.4)
IMM GRANULOCYTES NFR BLD AUTO: 0.5 % — SIGNIFICANT CHANGE UP (ref 0–0.9)
KETONES UR-MCNC: ABNORMAL
LEUKOCYTE ESTERASE UR-ACNC: ABNORMAL
LIDOCAIN IGE QN: 41 U/L — SIGNIFICANT CHANGE UP (ref 7–60)
LYMPHOCYTES # BLD AUTO: 1.72 K/UL — SIGNIFICANT CHANGE UP (ref 1–3.3)
LYMPHOCYTES # BLD AUTO: 21.1 % — SIGNIFICANT CHANGE UP (ref 13–44)
M PNEUMO DNA SPEC QL NAA+PROBE: SIGNIFICANT CHANGE UP
MCHC RBC-ENTMCNC: 29 PG — SIGNIFICANT CHANGE UP (ref 27–34)
MCHC RBC-ENTMCNC: 32.4 GM/DL — SIGNIFICANT CHANGE UP (ref 32–36)
MCV RBC AUTO: 89.3 FL — SIGNIFICANT CHANGE UP (ref 80–100)
MONOCYTES # BLD AUTO: 0.62 K/UL — SIGNIFICANT CHANGE UP (ref 0–0.9)
MONOCYTES NFR BLD AUTO: 7.6 % — SIGNIFICANT CHANGE UP (ref 2–14)
NEUTROPHILS # BLD AUTO: 5.64 K/UL — SIGNIFICANT CHANGE UP (ref 1.8–7.4)
NEUTROPHILS NFR BLD AUTO: 69.1 % — SIGNIFICANT CHANGE UP (ref 43–77)
NITRITE UR-MCNC: NEGATIVE — SIGNIFICANT CHANGE UP
NRBC # BLD: 0 /100 WBCS — SIGNIFICANT CHANGE UP (ref 0–0)
NRBC # FLD: 0 K/UL — SIGNIFICANT CHANGE UP (ref 0–0)
PH UR: 6 — SIGNIFICANT CHANGE UP (ref 5–8)
PLATELET # BLD AUTO: 167 K/UL — SIGNIFICANT CHANGE UP (ref 150–400)
POTASSIUM SERPL-MCNC: 3.7 MMOL/L — SIGNIFICANT CHANGE UP (ref 3.5–5.3)
POTASSIUM SERPL-SCNC: 3.7 MMOL/L — SIGNIFICANT CHANGE UP (ref 3.5–5.3)
PROT SERPL-MCNC: 6.3 G/DL — SIGNIFICANT CHANGE UP (ref 6–8.3)
PROT UR-MCNC: ABNORMAL
RAPID RVP RESULT: DETECTED
RBC # BLD: 3.35 M/UL — LOW (ref 3.8–5.2)
RBC # FLD: 13.7 % — SIGNIFICANT CHANGE UP (ref 10.3–14.5)
RBC CASTS # UR COMP ASSIST: 4 /HPF — SIGNIFICANT CHANGE UP (ref 0–4)
RSV RNA SPEC QL NAA+PROBE: SIGNIFICANT CHANGE UP
RV+EV RNA SPEC QL NAA+PROBE: SIGNIFICANT CHANGE UP
SARS-COV-2 RNA SPEC QL NAA+PROBE: DETECTED
SODIUM SERPL-SCNC: 137 MMOL/L — SIGNIFICANT CHANGE UP (ref 135–145)
SP GR SPEC: 1.03 — SIGNIFICANT CHANGE UP (ref 1.01–1.05)
UROBILINOGEN FLD QL: ABNORMAL
WBC # BLD: 8.16 K/UL — SIGNIFICANT CHANGE UP (ref 3.8–10.5)
WBC # FLD AUTO: 8.16 K/UL — SIGNIFICANT CHANGE UP (ref 3.8–10.5)
WBC UR QL: 8 /HPF — HIGH (ref 0–5)

## 2023-06-20 PROCEDURE — 93010 ELECTROCARDIOGRAM REPORT: CPT

## 2023-06-20 PROCEDURE — 99221 1ST HOSP IP/OBS SF/LOW 40: CPT

## 2023-06-20 RX ORDER — SODIUM CHLORIDE 9 MG/ML
1000 INJECTION, SOLUTION INTRAVENOUS ONCE
Refills: 0 | Status: COMPLETED | OUTPATIENT
Start: 2023-06-20 | End: 2023-06-20

## 2023-06-20 RX ORDER — ONDANSETRON 8 MG/1
4 TABLET, FILM COATED ORAL ONCE
Refills: 0 | Status: DISCONTINUED | OUTPATIENT
Start: 2023-06-20 | End: 2023-07-05

## 2023-06-20 RX ORDER — ACETAMINOPHEN 500 MG
2 TABLET ORAL
Qty: 0 | Refills: 0 | DISCHARGE

## 2023-06-20 RX ADMIN — SODIUM CHLORIDE 1000 MILLILITER(S): 9 INJECTION, SOLUTION INTRAVENOUS at 18:25

## 2023-06-20 NOTE — OB PROVIDER TRIAGE NOTE - NSHPPHYSICALEXAM_GEN_ALL_CORE
Vital Signs Last 24 Hrs  T(C): 37.0 (20 Jun 2023 17:35), Max: 37 (20 Jun 2023 17:22)  T(F): 98.6 (20 Jun 2023 17:35), Max: 98.6 (20 Jun 2023 17:22)  HR: 108 (20 Jun 2023 18:18) (108 - 116)  BP: 107/65 (20 Jun 2023 18:18) (102/67 - 114/66)  BP(mean): --  RR: 15 (20 Jun 2023 17:22) (15 - 15)  SpO2: 100% (20 Jun 2023 18:15) (100% - 100%)    abdomen soft, non tender  lungs clear, equal b/l  tas: Vital Signs Last 24 Hrs  T(C): 37.0 (20 Jun 2023 17:35), Max: 37 (20 Jun 2023 17:22)  T(F): 98.6 (20 Jun 2023 17:35), Max: 98.6 (20 Jun 2023 17:22)  HR: 108 (20 Jun 2023 18:18) (108 - 116)  BP: 107/65 (20 Jun 2023 18:18) (102/67 - 114/66)  BP(mean): --  RR: 15 (20 Jun 2023 17:22) (15 - 15)  SpO2: 100% (20 Jun 2023 18:15) (100% - 100%)    abdomen soft, non tender  lungs clear, equal b/l  tas: vertex, anterior placenta, MVP 4.2cm, 142bpm m-mode  nst: in progress  toco: no ctx Vital Signs Last 24 Hrs  T(C): 37.0 (20 Jun 2023 17:35), Max: 37 (20 Jun 2023 17:22)  T(F): 98.6 (20 Jun 2023 17:35), Max: 98.6 (20 Jun 2023 17:22)  HR: 108 (20 Jun 2023 18:18) (108 - 116)  BP: 107/65 (20 Jun 2023 18:18) (102/67 - 114/66)  BP(mean): --  RR: 15 (20 Jun 2023 17:22) (15 - 15)  SpO2: 100% (20 Jun 2023 18:15) (100% - 100%)    abdomen soft, non tender  elevated pulse, regular rhythm  lungs clear, equal b/l  tas: vertex, anterior placenta, MVP 4.2cm, 142bpm m-mode  nst: in progress  toco: no ctx

## 2023-06-20 NOTE — OB PROVIDER TRIAGE NOTE - ADDITIONAL INSTRUCTIONS
Follow up at next scheduled prenatal appointment  Return for decreased fetal movement, loss of fluid or vaginal bleeding  COVID Positive   Increase oral hydration, water and Gatorade  Increase diet as tolerated  Rest and Quarantine as per the CDC Guidelines

## 2023-06-20 NOTE — OB RN TRIAGE NOTE - NSICDXFAMILYHX_GEN_ALL_CORE_FT
FAMILY HISTORY:  Father  Still living? Unknown  Family history of hypertension, Age at diagnosis: Age Unknown    Mother  Still living? Yes, Estimated age: Age Unknown  Family history of diabetes mellitus (DM), Age at diagnosis: Age Unknown  Family history of hypertension, Age at diagnosis: Age Unknown    Aunt  Still living? No  Family history of stroke, Age at diagnosis: Age Unknown

## 2023-06-20 NOTE — OB PROVIDER TRIAGE NOTE - PLAN OF CARE
D/C Home  D/W Dr. Rivero  Covid Positive in pregnancy   No evidence of acute process  Normal fetal testing  Follow up at next scheduled prenatal appointment  Return for decreased fetal movement, loss of fluid or vaginal bleeding  COVID Positive   Increase oral hydration, water and Gatorade  Increase diet as tolerated  Rest and Quarantine as per the CDC Guidelines

## 2023-06-20 NOTE — OB PROVIDER TRIAGE NOTE - HISTORY OF PRESENT ILLNESS
PNC: Dr. Mcgarry    39y/o  at 25.6weeks presents with c/o nausea and vomiting x5 days. Pt reports last vomit episode was today at 12pm. NPO since 430pm rice and noodles, tolerated well. Reports occasional chills and palpitations. Denies any known sick contact, or urinary s/s. Pt reports she lost 7pounds since . Denies lof, vb, contractions, and reports positive fm.    AP course complicated by:  -AMA.   -81/162mg ASA. last took 81mg .  -acid reflux: saw GI in 2023 and was prescribed: Pantaprozole 40mg qd - most recent dose . pepcid 40mg qd- most recent dose . sucralfate 1g/10ml HS- most recent dose . PNC: Dr. Mcgarry    39y/o  at 25.6weeks presents with c/o nausea and vomiting x5 days, 2-3x/day. Pt reports last vomit episode was today at 12pm. NPO since 430pm rice and noodles, tolerated well. Reports occasional chills and palpitations. Denies any known sick contact, or urinary s/s. Pt reports she lost 7pounds since . Denies lof, vb, contractions, and reports positive fm.    -Most recent MFM sono : anterior placenta, transverse, mvp 4.99cm, efw: 331g 0#12.    AP course complicated by:  -AMA.   -81/162mg ASA. last took 81mg .  -acid reflux: saw Gastroenterologist Dr. Foreign Schwarz 3 weeks ago, and was prescribed: Pantaprozole 40mg qd - most recent dose . pepcid 40mg qd- most recent dose . sucralfate 1g/10ml HS- most recent dose .    -Next f/u , but pt reports she cancelled and will reschedule. PNC: Dr. Mcgarry    37y/o  at 25.6weeks presents with c/o nausea and vomiting x5 days, 2-3x/day. Pt reports last vomit episode was today at 12pm. NPO since 430pm rice and noodles, tolerated well. Reports occasional chills and palpitations. Denies any known sick contact, or urinary s/s. Reports bowel movement normal and able to pass gas w/o difficulty. Pt reports she lost 7pounds since . Denies lof, vb, contractions, and reports positive fm.    -Most recent MFM sono : anterior placenta, transverse, mvp 4.99cm, efw: 331g, 0#12.    AP course complicated by:  -AMA.   -81/162mg ASA. last took 81mg .  -acid reflux: saw Gastroenterologist Dr. Foreign Schwarz 3 weeks ago, and was prescribed: Pantaprozole 40mg qd - most recent dose . pepcid 40mg qd- most recent dose . sucralfate 1g/10ml HS- most recent dose .    -Next f/u , but pt reports she cancelled and will reschedule.

## 2023-06-20 NOTE — OB PROVIDER TRIAGE NOTE - NSOBPROVIDERNOTE_OBGYN_ALL_OB_FT
37y/o  at 25.6weeks ruling out gastroenteritis.  -CBC, CMP, Amylase, lipase, UA, EKG ordered.  -zofran ordered  -RVP obtained and sent  -IV LR bolus. 39y/o  at 25.6weeks ruling out gastroenteritis and respiratory virus.  -CBC, CMP, Amylase, lipase, UA, EKG ordered.  -zofran ordered  -RVP obtained and sent  -IV LR bolus. 39y/o  at 25.6weeks ruling out gastroenteritis and respiratory virus.  -CBC, CMP, Amylase, lipase, UA, EKG ordered.  -zofran ordered  -RVP obtained and sent  -IV LR bolus.    19:00  Received care of patient   NST discontinued  Cat 1 tracing, no contractions on toco   Patient declined zofran reports nausea is gone and declines any episodes of vomiting and has still kept down noddles and rice from 4:30pm    19:45  Presented to Dr. Rivero  cleared for d/c  Covid Positive

## 2023-06-20 NOTE — OB RN TRIAGE NOTE - NS_MODEOFARRIVAL_OBGYN_ALL_OB
Car Implemented All Universal Safety Interventions:  [place] to call system. Call bell, personal items and telephone within reach. Instruct patient to call for assistance. Room bathroom lighting operational. Non-slip footwear when patient is off stretcher. Physically safe environment: no spills, clutter or unnecessary equipment. Stretcher in lowest position, wheels locked, appropriate side rails in place.

## 2023-06-22 DIAGNOSIS — O09.522 SUPERVISION OF ELDERLY MULTIGRAVIDA, SECOND TRIMESTER: ICD-10-CM

## 2023-06-22 DIAGNOSIS — O09.292 SUPERVISION OF PREGNANCY WITH OTHER POOR REPRODUCTIVE OR OBSTETRIC HISTORY, SECOND TRIMESTER: ICD-10-CM

## 2023-06-22 DIAGNOSIS — K21.9 GASTRO-ESOPHAGEAL REFLUX DISEASE WITHOUT ESOPHAGITIS: ICD-10-CM

## 2023-06-22 DIAGNOSIS — O99.612 DISEASES OF THE DIGESTIVE SYSTEM COMPLICATING PREGNANCY, SECOND TRIMESTER: ICD-10-CM

## 2023-06-22 DIAGNOSIS — Z3A.25 25 WEEKS GESTATION OF PREGNANCY: ICD-10-CM

## 2023-06-22 DIAGNOSIS — U07.1 COVID-19: ICD-10-CM

## 2023-06-22 DIAGNOSIS — O98.512 OTHER VIRAL DISEASES COMPLICATING PREGNANCY, SECOND TRIMESTER: ICD-10-CM

## 2023-06-27 ENCOUNTER — NON-APPOINTMENT (OUTPATIENT)
Age: 38
End: 2023-06-27

## 2023-06-28 ENCOUNTER — APPOINTMENT (OUTPATIENT)
Dept: OBGYN | Facility: CLINIC | Age: 38
End: 2023-06-28

## 2023-07-03 ENCOUNTER — APPOINTMENT (OUTPATIENT)
Dept: OBGYN | Facility: CLINIC | Age: 38
End: 2023-07-03

## 2023-07-05 ENCOUNTER — APPOINTMENT (OUTPATIENT)
Dept: OBGYN | Facility: CLINIC | Age: 38
End: 2023-07-05
Payer: COMMERCIAL

## 2023-07-05 VITALS
DIASTOLIC BLOOD PRESSURE: 72 MMHG | WEIGHT: 127 LBS | SYSTOLIC BLOOD PRESSURE: 106 MMHG | BODY MASS INDEX: 21.16 KG/M2 | HEIGHT: 65 IN

## 2023-07-05 PROCEDURE — 0502F SUBSEQUENT PRENATAL CARE: CPT

## 2023-07-12 ENCOUNTER — APPOINTMENT (OUTPATIENT)
Dept: OBGYN | Facility: CLINIC | Age: 38
End: 2023-07-12
Payer: COMMERCIAL

## 2023-07-12 VITALS
WEIGHT: 126.7 LBS | SYSTOLIC BLOOD PRESSURE: 109 MMHG | HEIGHT: 60 IN | BODY MASS INDEX: 24.87 KG/M2 | HEART RATE: 116 BPM | DIASTOLIC BLOOD PRESSURE: 75 MMHG

## 2023-07-12 PROCEDURE — 0502F SUBSEQUENT PRENATAL CARE: CPT

## 2023-07-15 ENCOUNTER — EMERGENCY (EMERGENCY)
Facility: HOSPITAL | Age: 38
LOS: 1 days | Discharge: HOME | End: 2023-07-15
Attending: EMERGENCY MEDICINE | Admitting: EMERGENCY MEDICINE
Payer: COMMERCIAL

## 2023-07-15 ENCOUNTER — OUTPATIENT (OUTPATIENT)
Dept: OUTPATIENT SERVICES | Facility: HOSPITAL | Age: 38
LOS: 1 days | Discharge: HOME | End: 2023-07-15
Payer: COMMERCIAL

## 2023-07-15 VITALS
SYSTOLIC BLOOD PRESSURE: 105 MMHG | RESPIRATION RATE: 16 BRPM | OXYGEN SATURATION: 100 % | DIASTOLIC BLOOD PRESSURE: 68 MMHG | TEMPERATURE: 99 F | HEART RATE: 110 BPM

## 2023-07-15 VITALS
OXYGEN SATURATION: 100 % | HEART RATE: 96 BPM | RESPIRATION RATE: 18 BRPM | SYSTOLIC BLOOD PRESSURE: 118 MMHG | DIASTOLIC BLOOD PRESSURE: 76 MMHG | TEMPERATURE: 98 F

## 2023-07-15 VITALS
HEART RATE: 98 BPM | DIASTOLIC BLOOD PRESSURE: 72 MMHG | SYSTOLIC BLOOD PRESSURE: 115 MMHG | OXYGEN SATURATION: 100 % | RESPIRATION RATE: 18 BRPM | TEMPERATURE: 98 F

## 2023-07-15 VITALS — OXYGEN SATURATION: 100 % | HEART RATE: 98 BPM

## 2023-07-15 DIAGNOSIS — Z98.890 OTHER SPECIFIED POSTPROCEDURAL STATES: Chronic | ICD-10-CM

## 2023-07-15 DIAGNOSIS — O26.899 OTHER SPECIFIED PREGNANCY RELATED CONDITIONS, UNSPECIFIED TRIMESTER: ICD-10-CM

## 2023-07-15 LAB
ALBUMIN SERPL ELPH-MCNC: 3.8 G/DL — SIGNIFICANT CHANGE UP (ref 3.3–5)
ALP SERPL-CCNC: 117 U/L — SIGNIFICANT CHANGE UP (ref 40–120)
ALT FLD-CCNC: 55 U/L — HIGH (ref 4–33)
ANION GAP SERPL CALC-SCNC: 16 MMOL/L — HIGH (ref 7–14)
AST SERPL-CCNC: 40 U/L — HIGH (ref 4–32)
BASOPHILS # BLD AUTO: 0.05 K/UL — SIGNIFICANT CHANGE UP (ref 0–0.2)
BASOPHILS NFR BLD AUTO: 0.6 % — SIGNIFICANT CHANGE UP (ref 0–2)
BILIRUB SERPL-MCNC: 0.3 MG/DL — SIGNIFICANT CHANGE UP (ref 0.2–1.2)
BUN SERPL-MCNC: 4 MG/DL — LOW (ref 7–23)
CALCIUM SERPL-MCNC: 9 MG/DL — SIGNIFICANT CHANGE UP (ref 8.4–10.5)
CHLORIDE SERPL-SCNC: 103 MMOL/L — SIGNIFICANT CHANGE UP (ref 98–107)
CO2 SERPL-SCNC: 18 MMOL/L — LOW (ref 22–31)
CREAT SERPL-MCNC: 0.48 MG/DL — LOW (ref 0.5–1.3)
EGFR: 126 ML/MIN/1.73M2 — SIGNIFICANT CHANGE UP
EOSINOPHIL # BLD AUTO: 0.1 K/UL — SIGNIFICANT CHANGE UP (ref 0–0.5)
EOSINOPHIL NFR BLD AUTO: 1.2 % — SIGNIFICANT CHANGE UP (ref 0–6)
GLUCOSE SERPL-MCNC: 88 MG/DL — SIGNIFICANT CHANGE UP (ref 70–99)
HCT VFR BLD CALC: 27.8 % — LOW (ref 34.5–45)
HGB BLD-MCNC: 9 G/DL — LOW (ref 11.5–15.5)
IANC: 5.65 K/UL — SIGNIFICANT CHANGE UP (ref 1.8–7.4)
IMM GRANULOCYTES NFR BLD AUTO: 0.7 % — SIGNIFICANT CHANGE UP (ref 0–0.9)
LYMPHOCYTES # BLD AUTO: 2.06 K/UL — SIGNIFICANT CHANGE UP (ref 1–3.3)
LYMPHOCYTES # BLD AUTO: 23.8 % — SIGNIFICANT CHANGE UP (ref 13–44)
MCHC RBC-ENTMCNC: 27.7 PG — SIGNIFICANT CHANGE UP (ref 27–34)
MCHC RBC-ENTMCNC: 32.4 GM/DL — SIGNIFICANT CHANGE UP (ref 32–36)
MCV RBC AUTO: 85.5 FL — SIGNIFICANT CHANGE UP (ref 80–100)
MONOCYTES # BLD AUTO: 0.73 K/UL — SIGNIFICANT CHANGE UP (ref 0–0.9)
MONOCYTES NFR BLD AUTO: 8.4 % — SIGNIFICANT CHANGE UP (ref 2–14)
NEUTROPHILS # BLD AUTO: 5.65 K/UL — SIGNIFICANT CHANGE UP (ref 1.8–7.4)
NEUTROPHILS NFR BLD AUTO: 65.3 % — SIGNIFICANT CHANGE UP (ref 43–77)
NRBC # BLD: 0 /100 WBCS — SIGNIFICANT CHANGE UP (ref 0–0)
NRBC # FLD: 0 K/UL — SIGNIFICANT CHANGE UP (ref 0–0)
PLATELET # BLD AUTO: 181 K/UL — SIGNIFICANT CHANGE UP (ref 150–400)
POTASSIUM SERPL-MCNC: 3.1 MMOL/L — LOW (ref 3.5–5.3)
POTASSIUM SERPL-SCNC: 3.1 MMOL/L — LOW (ref 3.5–5.3)
PROT SERPL-MCNC: 6.7 G/DL — SIGNIFICANT CHANGE UP (ref 6–8.3)
RBC # BLD: 3.25 M/UL — LOW (ref 3.8–5.2)
RBC # FLD: 13.4 % — SIGNIFICANT CHANGE UP (ref 10.3–14.5)
SODIUM SERPL-SCNC: 137 MMOL/L — SIGNIFICANT CHANGE UP (ref 135–145)
WBC # BLD: 8.65 K/UL — SIGNIFICANT CHANGE UP (ref 3.8–10.5)
WBC # FLD AUTO: 8.65 K/UL — SIGNIFICANT CHANGE UP (ref 3.8–10.5)

## 2023-07-15 PROCEDURE — 99284 EMERGENCY DEPT VISIT MOD MDM: CPT

## 2023-07-15 PROCEDURE — 70360 X-RAY EXAM OF NECK: CPT | Mod: 26

## 2023-07-15 PROCEDURE — 59025 FETAL NON-STRESS TEST: CPT | Mod: 26

## 2023-07-15 PROCEDURE — 99212 OFFICE O/P EST SF 10 MIN: CPT | Mod: 25

## 2023-07-15 RX ORDER — SUCRALFATE 1 G
10 TABLET ORAL
Qty: 150 | Refills: 0
Start: 2023-07-15 | End: 2023-07-19

## 2023-07-15 RX ORDER — SUCRALFATE 1 G
1 TABLET ORAL ONCE
Refills: 0 | Status: COMPLETED | OUTPATIENT
Start: 2023-07-15 | End: 2023-07-15

## 2023-07-15 RX ORDER — FAMOTIDINE 10 MG/ML
20 INJECTION INTRAVENOUS ONCE
Refills: 0 | Status: COMPLETED | OUTPATIENT
Start: 2023-07-15 | End: 2023-07-15

## 2023-07-15 RX ORDER — SODIUM CHLORIDE 9 MG/ML
1000 INJECTION INTRAMUSCULAR; INTRAVENOUS; SUBCUTANEOUS ONCE
Refills: 0 | Status: COMPLETED | OUTPATIENT
Start: 2023-07-15 | End: 2023-07-15

## 2023-07-15 RX ORDER — POTASSIUM CHLORIDE 20 MEQ
40 PACKET (EA) ORAL ONCE
Refills: 0 | Status: COMPLETED | OUTPATIENT
Start: 2023-07-15 | End: 2023-07-15

## 2023-07-15 RX ADMIN — SODIUM CHLORIDE 1000 MILLILITER(S): 9 INJECTION INTRAMUSCULAR; INTRAVENOUS; SUBCUTANEOUS at 19:28

## 2023-07-15 RX ADMIN — FAMOTIDINE 20 MILLIGRAM(S): 10 INJECTION INTRAVENOUS at 19:28

## 2023-07-15 NOTE — ED PROVIDER NOTE - OBJECTIVE STATEMENT
37 y/o female pmh GERD, 29 weeks pregnant c/o difficulty swallowing x 10 days and trouble sleeping x 1 month. Pt states that she had covid about 3 weeks ago. Pt had vomiting at that time which resolved. About 10 days ago pt started to experience diffiuclty swallowing solids and liquids. Pt states "my tongue comes out of my mouth after I swallow". Pt also admits to waking up every night having feeling like she cant breathe and panicking. Pt has been very anxious and nervous lately but does not take any medication for anxiety. Pt denies chest pain, st, n/v/d, numbness, tingling, weakness, dizziness, syncope, fever or chills. Of note, pt was able to eat breakfast this am.

## 2023-07-15 NOTE — OB PROVIDER TRIAGE NOTE - NSOBPROVIDERNOTE_OBGYN_ALL_OB_FT
39yo female  @ 29.3 wks SLIUP uncomp PNC here with decreased FM and scratchy throat post covid 3 wks ago  -NST and BPP are 10/10  -pt reports + FM at this time  -pt was dw Dr Boogie and pt cleared for dc home at 1708 with instructions to keep her appt with Dr Mcgarry for Wed 7/19  -pt to go to ED for further evaluation throat discomfort  -Pt was phill ED triage nurse

## 2023-07-15 NOTE — ED PROVIDER NOTE - NS ED ATTENDING STATEMENT MOD
This was a shared visit with the LINUS. I reviewed and verified the documentation and independently performed the documented:

## 2023-07-15 NOTE — ED ADULT TRIAGE NOTE - CHIEF COMPLAINT QUOTE
difficulty swallowing food and liquids x 10 days. PT also reports difficulty staying asleep x 2 weeks. PT is 29 weeks with no pregnancy concerns reported. Hx of acid reflux

## 2023-07-15 NOTE — ED PROVIDER NOTE - CLINICAL SUMMARY MEDICAL DECISION MAKING FREE TEXT BOX
39 y/o female pmh gerd, 29 weeks pregnant c/o difficulty swallowing x 10 days and difficulty sleeping x 1 month- Pt went to L&D today and was cleared by GYN for eval. Pt states that she feels like she is choking when she swallows however has been able to eat some meals. Pt ate eggs and drank milk today. Pt already has a GI who she follows with and takes protonix and pepcid. pt feels pressure from her epigatric area into her chst and throat. Pt also c/o difficulty swallowing and panicking in the middle of the night. Pt has no documented hx of anxiety but does admit to being very nervous and anxious recently. Pt appears well, and, afebrile, normal PE, throat clear, no erythema, no edema, lungs cta b/l, no stridor, no neck swelling or tenderness- Symptoms likely a combination of anxiety and gerd, low concern for throat infection, esophageal or motility disorder- will check labs, soft tissue neck xray, pepcid and reassess.

## 2023-07-15 NOTE — OB PROVIDER TRIAGE NOTE - NSPRENATALCARE_OBGYN_ALL_OB
Daily Note     Today's date: 2021  Patient name: Marco Winston  : 1966  MRN: [id number]  Referring provider: Angela Frances DO  Dx:   Encounter Diagnosis     ICD-10-CM    1  Acute right lumbar radiculopathy  M54 16        Start Time: 1630  Stop Time:   Total time in clinic (min): 47 minutes    Subjective: Patient reports one instance of return of radicular symptoms, but notes she was able to resolve the pain c/ the repeated side glides  She was able to walk around at a fair this weekend and also was able to manage her pain c/ the repeated side glides and extensions  Patient has a f/u c/ her doctor tomorrow   Objective: See treatment diary below      Assessment: Tolerated treatment well  Patient exhibited good technique with therapeutic exercises, re-introduced core stability exercises, monitoring for increase or change in LBP and LE symptoms  Patient did not experience any increase in symptoms with any of the exercises  Plan: Continue per plan of care  Precautions: None      Date    Manuals        LAD    Grade III: KS Grade V, lumbar bias: KS   Lumbar Roll        Sacrel MET        Upslip/Downslip        Shotgun     KS   Lateral Hip Distraction        Manual Traction     KS   STM to b/l Lumbar Erector Spinae     KS                   Neuro Re-Ed        Abdominal Hollow        Abdominal Isometric   10"x10 5"x20 5"x20   Supine Clams   5"x20 L3 5"x20 L3 5" x20   Ball Squeeze   5"x20 10"x10 10"x10   Seated Floss        Prone on Wedge    15' 15'   Prone Press-ups     2x10 1x10 c/ OP, 1x10 s/ OP   Sitting Mechanics 5' 5'      Sleeping Posture 10' 3'      Gliding Mechanics 15' x30 Right Side Glide c/ education 5' x30 Right Side Chest [place] c/ education 5'     SBB c/ self OP  x30  x30                     Ther Ex        LTRs     x30ea   SKTC         Piriformis Stretch        S/Lying Clams   L2 x 20 ea  L3 x20ea  L3 x20ea     HS Stretch        Hip Flexor Stretch Abdominal Hollow March        Open Book        Plank        Birddog UE/LE        Leg Press        CC Rows and Ext   L3 2x10     Seated Flexion/SB        Hand/Heel Rock: FLX & EXT    5"x10 ext only 5"x10 ext only   Stand: Ext/Abd        Bike/Treadmill                                Ther Activity        Chop/Lift        Bridge        Box Lift        Wall Squat        Lunge        Sit to Stand                        Modalities        CP         MHP    10' c/ prone on wedge    Traction    75# 12' Static  Prone Yes

## 2023-07-15 NOTE — ED PROVIDER NOTE - PATIENT PORTAL LINK FT
You can access the FollowMyHealth Patient Portal offered by [facility] by registering at the following website: [email or web address]. By joining [facility]’s FollowMyHealth portal, you will also be able to view your health information using other applications (apps) compatible with our system.

## 2023-07-15 NOTE — ED PROVIDER NOTE - NSFOLLOWUPINSTRUCTIONS_ED_ALL_ED_FT
Dysphagia    WHAT YOU NEED TO KNOW:    What is dysphagia? Dysphagia is trouble swallowing. You may have trouble moving food or liquid from your mouth to your esophagus or down to your stomach. You may have the problem when you eat, drink, or any time you try to swallow. Dysphagia can last a short time, or it can be a permanent problem.  Abdominal Organs    What increases my risk for dysphagia?    Narrowing of your esophagus caused by acid reflux, an infection, or tumors    Nerve or muscle problems that slow the movement of food    Brain injury, or conditions that affect the nervous system such as stroke, dementia, or Parkinson disease    Radiation therapy or head and neck surgery    Certain medicines such as antihistamines, diuretics, antidepressants, and blood pressure or antinausea medicines  What other signs and symptoms may I have?    Drooling, coughing after swallowing, or spitting up food    Hoarse or wet-sounding voice while you eat or drink    Feeling like food is stuck in your throat or pressure in your chest after you eat    Lung infections that happen often    Weight loss without trying  How is dysphagia diagnosed? Your healthcare provider may ask if you only have trouble swallowing when you eat or drink, or any time you try to swallow. You may also need any of the following tests:    A water swallow screening test will show how well you swallow thinner liquids, such as water. Thinner liquids can make you choke more easily than thicker liquids. This test may show signs of dysphagia and aspiration (movement of liquid into your lungs). It can be used to help healthcare providers decide if you need other tests.    Other swallow tests may show which parts of your throat or esophagus are not working well. These tests may include x-rays of your throat and esophagus. You may be given a thick liquid called barium to help your esophagus show up better on x-rays. These tests may also show if the position of your head affects the way you swallow.    Endoscopy is a procedure that may show narrowing or inflammation in your esophagus.  Upper Endoscopy      Manometry measures the pressure within the esophagus and stomach.    pH monitoring is used to check your throat for acid reflux.  How is dysphagia treated? Treatment depends on the cause of your dysphagia. You may need medicine to reduce acid reflux or muscle spasms in your throat. You may also need any of the following:    Nutrition changes may reduce choking problems. Your healthcare provider may show you how to thicken liquids or soften foods to make them easier to swallow.    A therapist can teach you different ways of swallowing by changing your head and body positions. You may be taught exercises to strengthen the muscles that help you swallow.    Surgery may be needed to widen your esophagus or treat other medical conditions that cause dysphagia. Surgery may also be used to place a feeding tube. Liquid nutrition and medicines can be put through the tube so you do not have to swallow them.  Call your local emergency number (911 in the [place]) if:    You have chest pain.    You have shortness of breath.  When should I seek immediate care?    You choke on your saliva.    You cannot eat or drink liquids at all.  When should I call my doctor?    You lose weight without trying.    Your signs and symptoms get worse, or you have new signs or symptoms.    You have signs or symptoms of dehydration, such as increased thirst, dark yellow urine, or little or no urine.    You get colds often.    You have questions or concerns about your condition or care.  CARE AGREEMENT:    You have the right to help plan your care. Learn about your health condition and how it may be treated. Discuss treatment options with your healthcare providers to decide what care you want to receive. You always have the right to refuse treatment.

## 2023-07-15 NOTE — OB RN TRIAGE NOTE - FALL HARM RISK - UNIVERSAL INTERVENTIONS
Bed in lowest position, wheels locked, appropriate side rails in place/Call bell, personal items and telephone in reach/Instruct patient to call for assistance before getting out of bed or chair/Non-slip footwear when patient is out of bed/[place] to call system/Physically safe environment - no spills, clutter or unnecessary equipment/Purposeful Proactive Rounding/Room/bathroom lighting operational, light cord in reach

## 2023-07-15 NOTE — OB RN TRIAGE NOTE - BP NONINVASIVE SYSTOLIC (MM HG)
Hide Additional Notes?: No 105 Additional Notes (Optional): Eversion will settle down within 2-4 months post-surgery. The thickness of the scar will decrease within a few months to a year. Numbness is normal in the area operated on, and usually resolves within one year. During this time, it is normal to feel tingling or shooting sensations around the scar. Detail Level: Simple

## 2023-07-15 NOTE — OB PROVIDER TRIAGE NOTE - NSHPPHYSICALEXAM_GEN_ALL_CORE
ICU Vital Signs Last 24 Hrs  T(C): 37 (15 Jul 2023 16:00), Max: 37 (15 Jul 2023 16:00)  T(F): 98.6 (15 Jul 2023 16:00), Max: 98.6 (15 Jul 2023 16:00)  HR: 98 (15 Jul 2023 16:58) (93 - 110)  BP: 109/70 (15 Jul 2023 16:47) (105/68 - 109/70)  BP(mean): --  ABP: --  ABP(mean): --  RR: 16 (15 Jul 2023 16:00) (16 - 16)  SpO2: 100% (15 Jul 2023 16:58) (100% - 100%)    Gen A&O x 3; NAD    Pulm- breathing is unlabored and pt is making periodic scratchy noices with her throat  Abd exam- soft and nontender  Extremities- full range of motion with no edema    NST reactive with accels and mod variability; irritability on toco    Abd sono- Images saved to sono- breech; LIDIA-16.74; 8/8 BPP with a fundal anterior placenta

## 2023-07-15 NOTE — ED PROVIDER NOTE - ATTENDING APP SHARED VISIT CONTRIBUTION OF CARE
Agree with above, patient currently 29 weeks pregnant presents with 10 days of discomfort with swallowing solids and liquids. Patient states when she drinks water creates reflux and gagging sensation but does not vomit.  Was able to eat breakfast this morning without issue.  Patient was referred down to the emergency department after evaluation in the OB/GYN floor of her fetus which was normal.  On my exam patient is well-appearing she is able to swallow water, able to eat solids but with some discomfort.  Has known history of GERD, suspect worsening symptoms in setting of progressing pregnancy.  Patient has no crepitus, neck swelling, neck masses, lungs clear to station bilaterally, oropharynx normal-appearing.  Plan for symptomatic treatment of likely worsening acid reflux in the setting of pregnancy, patient already has follow-up appointment this week with her GI doctor and her OB/GYN.

## 2023-07-17 DIAGNOSIS — Z86.16 PERSONAL HISTORY OF COVID-19: ICD-10-CM

## 2023-07-17 DIAGNOSIS — Z3A.29 29 WEEKS GESTATION OF PREGNANCY: ICD-10-CM

## 2023-07-17 DIAGNOSIS — O09.523 SUPERVISION OF ELDERLY MULTIGRAVIDA, THIRD TRIMESTER: ICD-10-CM

## 2023-07-17 DIAGNOSIS — K21.9 GASTRO-ESOPHAGEAL REFLUX DISEASE WITHOUT ESOPHAGITIS: ICD-10-CM

## 2023-07-17 DIAGNOSIS — O36.8130 DECREASED FETAL MOVEMENTS, THIRD TRIMESTER, NOT APPLICABLE OR UNSPECIFIED: ICD-10-CM

## 2023-07-17 DIAGNOSIS — O99.613 DISEASES OF THE DIGESTIVE SYSTEM COMPLICATING PREGNANCY, THIRD TRIMESTER: ICD-10-CM

## 2023-07-17 DIAGNOSIS — O09.293 SUPERVISION OF PREGNANCY WITH OTHER POOR REPRODUCTIVE OR OBSTETRIC HISTORY, THIRD TRIMESTER: ICD-10-CM

## 2023-07-19 ENCOUNTER — APPOINTMENT (OUTPATIENT)
Dept: OBGYN | Facility: CLINIC | Age: 38
End: 2023-07-19
Payer: COMMERCIAL

## 2023-07-19 VITALS
DIASTOLIC BLOOD PRESSURE: 70 MMHG | BODY MASS INDEX: 24.54 KG/M2 | HEART RATE: 89 BPM | SYSTOLIC BLOOD PRESSURE: 107 MMHG | HEIGHT: 60 IN | WEIGHT: 125 LBS

## 2023-07-19 PROCEDURE — 0502F SUBSEQUENT PRENATAL CARE: CPT

## 2023-07-25 ENCOUNTER — ASOB RESULT (OUTPATIENT)
Age: 38
End: 2023-07-25

## 2023-07-25 ENCOUNTER — APPOINTMENT (OUTPATIENT)
Dept: ANTEPARTUM | Facility: CLINIC | Age: 38
End: 2023-07-25
Payer: COMMERCIAL

## 2023-07-25 PROCEDURE — 76816 OB US FOLLOW-UP PER FETUS: CPT

## 2023-07-25 PROCEDURE — 76820 UMBILICAL ARTERY ECHO: CPT | Mod: 59

## 2023-07-25 PROCEDURE — 99202 OFFICE O/P NEW SF 15 MIN: CPT | Mod: 25

## 2023-07-25 PROCEDURE — 76819 FETAL BIOPHYS PROFIL W/O NST: CPT | Mod: 59

## 2023-07-26 ENCOUNTER — OUTPATIENT (OUTPATIENT)
Dept: OUTPATIENT SERVICES | Facility: HOSPITAL | Age: 38
LOS: 1 days | End: 2023-07-26
Payer: COMMERCIAL

## 2023-07-26 DIAGNOSIS — Z98.890 OTHER SPECIFIED POSTPROCEDURAL STATES: Chronic | ICD-10-CM

## 2023-07-26 DIAGNOSIS — O26.899 OTHER SPECIFIED PREGNANCY RELATED CONDITIONS, UNSPECIFIED TRIMESTER: ICD-10-CM

## 2023-07-26 DIAGNOSIS — Z3A.00 WEEKS OF GESTATION OF PREGNANCY NOT SPECIFIED: ICD-10-CM

## 2023-07-26 LAB
ALBUMIN SERPL ELPH-MCNC: 2.7 G/DL — LOW (ref 3.5–5)
ALP SERPL-CCNC: 129 U/L — HIGH (ref 40–120)
ALT FLD-CCNC: 70 U/L DA — HIGH (ref 10–60)
AMYLASE P1 CFR SERPL: 44 U/L — SIGNIFICANT CHANGE UP (ref 25–115)
ANION GAP SERPL CALC-SCNC: 14 MMOL/L — SIGNIFICANT CHANGE UP (ref 5–17)
AST SERPL-CCNC: 84 U/L — HIGH (ref 10–40)
BASOPHILS # BLD AUTO: 0.05 K/UL — SIGNIFICANT CHANGE UP (ref 0–0.2)
BASOPHILS NFR BLD AUTO: 0.6 % — SIGNIFICANT CHANGE UP (ref 0–2)
BILIRUB SERPL-MCNC: 0.7 MG/DL — SIGNIFICANT CHANGE UP (ref 0.2–1.2)
BUN SERPL-MCNC: 6 MG/DL — LOW (ref 7–18)
CALCIUM SERPL-MCNC: 8.4 MG/DL — SIGNIFICANT CHANGE UP (ref 8.4–10.5)
CHLORIDE SERPL-SCNC: 106 MMOL/L — SIGNIFICANT CHANGE UP (ref 96–108)
CO2 SERPL-SCNC: 15 MMOL/L — LOW (ref 22–31)
CREAT SERPL-MCNC: 0.49 MG/DL — LOW (ref 0.5–1.3)
EGFR: 125 ML/MIN/1.73M2 — SIGNIFICANT CHANGE UP
EOSINOPHIL # BLD AUTO: 0.09 K/UL — SIGNIFICANT CHANGE UP (ref 0–0.5)
EOSINOPHIL NFR BLD AUTO: 1.2 % — SIGNIFICANT CHANGE UP (ref 0–6)
GLUCOSE SERPL-MCNC: 69 MG/DL — LOW (ref 70–99)
HCT VFR BLD CALC: 27.3 % — LOW (ref 34.5–45)
HGB BLD-MCNC: 8.6 G/DL — LOW (ref 11.5–15.5)
IMM GRANULOCYTES NFR BLD AUTO: 1.2 % — HIGH (ref 0–0.9)
LIDOCAIN IGE QN: 129 U/L — SIGNIFICANT CHANGE UP (ref 73–393)
LYMPHOCYTES # BLD AUTO: 1.58 K/UL — SIGNIFICANT CHANGE UP (ref 1–3.3)
LYMPHOCYTES # BLD AUTO: 20.3 % — SIGNIFICANT CHANGE UP (ref 13–44)
MCHC RBC-ENTMCNC: 27.8 PG — SIGNIFICANT CHANGE UP (ref 27–34)
MCHC RBC-ENTMCNC: 31.5 GM/DL — LOW (ref 32–36)
MCV RBC AUTO: 88.3 FL — SIGNIFICANT CHANGE UP (ref 80–100)
MONOCYTES # BLD AUTO: 0.59 K/UL — SIGNIFICANT CHANGE UP (ref 0–0.9)
MONOCYTES NFR BLD AUTO: 7.6 % — SIGNIFICANT CHANGE UP (ref 2–14)
NEUTROPHILS # BLD AUTO: 5.37 K/UL — SIGNIFICANT CHANGE UP (ref 1.8–7.4)
NEUTROPHILS NFR BLD AUTO: 69.1 % — SIGNIFICANT CHANGE UP (ref 43–77)
NRBC # BLD: 0 /100 WBCS — SIGNIFICANT CHANGE UP (ref 0–0)
PLATELET # BLD AUTO: 184 K/UL — SIGNIFICANT CHANGE UP (ref 150–400)
POTASSIUM SERPL-MCNC: 4.8 MMOL/L — SIGNIFICANT CHANGE UP (ref 3.5–5.3)
POTASSIUM SERPL-SCNC: 4.8 MMOL/L — SIGNIFICANT CHANGE UP (ref 3.5–5.3)
PROT SERPL-MCNC: 7 G/DL — SIGNIFICANT CHANGE UP (ref 6–8.3)
RBC # BLD: 3.09 M/UL — LOW (ref 3.8–5.2)
RBC # FLD: 14.3 % — SIGNIFICANT CHANGE UP (ref 10.3–14.5)
SODIUM SERPL-SCNC: 135 MMOL/L — SIGNIFICANT CHANGE UP (ref 135–145)
WBC # BLD: 7.77 K/UL — SIGNIFICANT CHANGE UP (ref 3.8–10.5)
WBC # FLD AUTO: 7.77 K/UL — SIGNIFICANT CHANGE UP (ref 3.8–10.5)

## 2023-07-26 PROCEDURE — 85025 COMPLETE CBC W/AUTO DIFF WBC: CPT

## 2023-07-26 PROCEDURE — 82150 ASSAY OF AMYLASE: CPT

## 2023-07-26 PROCEDURE — 36415 COLL VENOUS BLD VENIPUNCTURE: CPT

## 2023-07-26 PROCEDURE — 59025 FETAL NON-STRESS TEST: CPT

## 2023-07-26 PROCEDURE — 82962 GLUCOSE BLOOD TEST: CPT

## 2023-07-26 PROCEDURE — 87077 CULTURE AEROBIC IDENTIFY: CPT

## 2023-07-26 PROCEDURE — 80053 COMPREHEN METABOLIC PANEL: CPT

## 2023-07-26 PROCEDURE — 87070 CULTURE OTHR SPECIMN AEROBIC: CPT

## 2023-07-26 PROCEDURE — G0463: CPT

## 2023-07-26 PROCEDURE — 83690 ASSAY OF LIPASE: CPT

## 2023-07-26 RX ORDER — FAMOTIDINE 10 MG/ML
20 INJECTION INTRAVENOUS ONCE
Refills: 0 | Status: COMPLETED | OUTPATIENT
Start: 2023-07-26 | End: 2023-07-26

## 2023-07-26 RX ORDER — SODIUM CHLORIDE 9 MG/ML
1000 INJECTION, SOLUTION INTRAVENOUS ONCE
Refills: 0 | Status: COMPLETED | OUTPATIENT
Start: 2023-07-26 | End: 2023-07-26

## 2023-07-26 RX ADMIN — FAMOTIDINE 20 MILLIGRAM(S): 10 INJECTION INTRAVENOUS at 09:44

## 2023-07-26 RX ADMIN — SODIUM CHLORIDE 1000 MILLILITER(S): 9 INJECTION, SOLUTION INTRAVENOUS at 08:20

## 2023-07-27 ENCOUNTER — APPOINTMENT (OUTPATIENT)
Dept: PULMONOLOGY | Facility: CLINIC | Age: 38
End: 2023-07-27
Payer: COMMERCIAL

## 2023-07-27 ENCOUNTER — NON-APPOINTMENT (OUTPATIENT)
Age: 38
End: 2023-07-27

## 2023-07-27 VITALS
OXYGEN SATURATION: 98 % | TEMPERATURE: 96.7 F | HEIGHT: 60 IN | BODY MASS INDEX: 24.15 KG/M2 | RESPIRATION RATE: 16 BRPM | SYSTOLIC BLOOD PRESSURE: 106 MMHG | HEART RATE: 86 BPM | WEIGHT: 123 LBS | DIASTOLIC BLOOD PRESSURE: 70 MMHG

## 2023-07-27 DIAGNOSIS — J45.909 UNSPECIFIED ASTHMA, UNCOMPLICATED: ICD-10-CM

## 2023-07-27 DIAGNOSIS — R06.02 SHORTNESS OF BREATH: ICD-10-CM

## 2023-07-27 DIAGNOSIS — R06.83 SNORING: ICD-10-CM

## 2023-07-27 DIAGNOSIS — K21.9 GASTRO-ESOPHAGEAL REFLUX DISEASE W/OUT ESOPHAGITIS: ICD-10-CM

## 2023-07-27 DIAGNOSIS — J30.9 ALLERGIC RHINITIS, UNSPECIFIED: ICD-10-CM

## 2023-07-27 DIAGNOSIS — U09.9 POST COVID-19 CONDITION, UNSPECIFIED: ICD-10-CM

## 2023-07-27 PROCEDURE — 94729 DIFFUSING CAPACITY: CPT

## 2023-07-27 PROCEDURE — 99204 OFFICE O/P NEW MOD 45 MIN: CPT | Mod: 25

## 2023-07-27 PROCEDURE — 94060 EVALUATION OF WHEEZING: CPT

## 2023-07-27 PROCEDURE — 94727 GAS DIL/WSHOT DETER LNG VOL: CPT

## 2023-07-27 PROCEDURE — 94618 PULMONARY STRESS TESTING: CPT

## 2023-07-27 RX ORDER — BUDESONIDE 32 UG/1
32 SPRAY, METERED NASAL
Qty: 1 | Refills: 3 | Status: ACTIVE | COMMUNITY
Start: 2023-07-27 | End: 1900-01-01

## 2023-07-27 RX ORDER — BUDESONIDE 0.5 MG/2ML
0.5 INHALANT ORAL TWICE DAILY
Qty: 60 | Refills: 3 | Status: ACTIVE | COMMUNITY
Start: 2023-07-27 | End: 1900-01-01

## 2023-07-27 RX ORDER — SUCRALFATE 1 G/1
1 TABLET ORAL
Refills: 0 | Status: ACTIVE | COMMUNITY

## 2023-07-27 RX ORDER — FAMOTIDINE 40 MG/1
40 TABLET, FILM COATED ORAL
Refills: 0 | Status: ACTIVE | COMMUNITY

## 2023-07-27 RX ORDER — LEVALBUTEROL HYDROCHLORIDE 0.63 MG/3ML
0.63 SOLUTION RESPIRATORY (INHALATION)
Qty: 120 | Refills: 1 | Status: ACTIVE | COMMUNITY
Start: 2023-07-27 | End: 1900-01-01

## 2023-07-27 NOTE — ADDENDUM
[FreeTextEntry1] : Documented by John Anderson acting as a scribe for Dr. Eduardo Ga on 07/27/2023 .\par \par All medical record entries made by the Scribe were at my, Dr. Eduardo Ga's, direction and personally dictated by me on 07/27/2023 . I have reviewed the chart and agree that the record accurately reflects my personal performance of the history, physical exam, assessment and plan. I have also personally directed, reviewed, and agree with the discharge instructions.

## 2023-07-27 NOTE — PROCEDURE
[FreeTextEntry1] : Full PFT reveals normal flows; FEV1 was 2.78 L which is 109% of predicted; normal lung volumes; normal diffusion at 19.2, which is 97% of predicted; normal flow volume loop. PFTs were performed to evaluate for SOB\par \par 6 minute walk test reveals a low saturation of 93% with slight evidence of dyspnea or fatigue; walked 391.2 meters \par \par FENO Unable

## 2023-07-27 NOTE — REASON FOR VISIT
[Initial] : an initial visit [TextBox_44] : SOB, ?allergies, likely RADs (post COVID-19) complicated by underlying allergies and GERD, ?DIEGO and chest pain

## 2023-07-27 NOTE — PHYSICAL EXAM
[No Acute Distress] : no acute distress [Normal Oropharynx] : normal oropharynx [Normal Appearance] : normal appearance [No Neck Mass] : no neck mass [Normal Rate/Rhythm] : normal rate/rhythm [Normal S1, S2] : normal s1, s2 [No Murmurs] : no murmurs [No Resp Distress] : no resp distress [Clear to Auscultation Bilaterally] : clear to auscultation bilaterally [No Abnormalities] : no abnormalities [Benign] : benign [Normal Gait] : normal gait [No Clubbing] : no clubbing [No Cyanosis] : no cyanosis [No Edema] : no edema [FROM] : FROM [Normal Color/ Pigmentation] : normal color/ pigmentation [No Focal Deficits] : no focal deficits [Oriented x3] : oriented x3 [Normal Affect] : normal affect [III] : Mallampati Class: III [TextBox_2] : Pregnant  [TextBox_68] : I:E ratio 1:3; clear

## 2023-07-27 NOTE — ASSESSMENT
[FreeTextEntry1] : Ms. SHETTY  is a 38 year year old female with a history of born in [place], COVID-19 x2 (1/2022, 6/2023), GERD who now comes to the office for an initial pulmonary evaluation for SOB, ?allergies, likely RADs (post COVID-19) complicated by underlying allergies and GERD, ?DIEGO and chest pain \par \par Her shortness of breath is multifactorial due to:\par -poor mechanics of breathing \par -out of shape  \par -pulmonary disease\par        - likely RADs (Post COVID-19)\par        - doubting PE at this time \par -cardiac disease (Renato)\par         -doubtful \par \par problem 1: RADs (post COVID)\par -add Xopenex 0.63 via nebulizer q6H prn \par -add Pulmicort 0.5% via nebulizer BID \par -Inhaler technique reviewed as well as oral hygiene techniques reviewed with patient. Avoidance of cold air, extremes of temperature, rescue inhaler should be used before exercise. Order of medication reviewed with patient. Recommended use of a cool mist humidifier in the bedroom. \par  \par problem 2: Allergies / Sinus\par -get Blood work to include: asthma panel, food IgE panel, IgE level, eosinophil level, vitamin D level \par -add Rhinocort 1 sniff BID \par Environmental measures for allergies were encouraged including mattress and pillow covers, air purifier, and environmental controls. \par \par Problem 3: GERD / LPR\par -continue Pepcid 40 mg QHS \par -recommended Reflux Gourmet \par -recommended Gaviscon\par -Rule of 2s: avoid eating too much, eating too late, eating too spicy, eating two hours before bed. -Things to avoid including overeating, spicy foods, tight clothing, eating within three hours of bed, this list is not all inclusive.\par -For treatment of reflux, possible options discussed including diet control, H2 blockers, PPIs, as well as coating motility agents discussed as treatment options. Timing of meals and proximity of last meal to sleep were discussed. If symptoms persist, a formal gastrointestinal evaluation is needed. \par \par Problem 4: ?DIEGO (Risk factors: elevated Mallampati class)\par -get home sleep study\par Sleep apnea is associated with adverse clinical consequences which can affect most organ systems. Cardiovascular disease risk includes arrhythmias, atrial fibrillation, hypertension, coronary artery disease, and stroke. Metabolic disorders include diabetes type 2, non-alcoholic fatty liver disease. Mood disorder especially depression; and cognitive decline especially in the elderly. Associations with chronic reflux/Mims’s esophagus some but not all inclusive.\par -Reasons include arousal consistent with hypopnea; respiratory events most prominent in REM sleep or supine position; therefore sleep staging and body position are important for accurate diagnosis and estimation of AHI. \par \par problem 5: cardiac disease (Renato)\par -echo pending \par -recommended to continue to follow up with Cardiologist \par \par problem 6: poor breathing mechanics\par -Proper breathing techniques were reviewed with an emphasis of exhalation. Patient instructed to breath in for 1 second and out for four seconds. Patient was encouraged to not talk while walking. \par \par problem 7: Out of shape\par -Exercise, and diet control were discussed and are highly encouraged. Treatment options are given such as, aqua therapy, and contacting a nutritionist. Recommended to use the elliptical, stationary bike, less use of treadmill. \par \par problem 8: health maintenance \par -recommended yearly flu shot after October 15\par -recommended strep pneumonia vaccines: Prevnar-20 vaccine, followed by Pneumo vaccine 23 one year following after 65 years old. \par -recommended early intervention for Upper Respiratory Infections (URIs)\par -recommended regular osteoporosis evaluations\par -recommended early dermatological evaluations\par -recommended after the age of 50 to the age of 70, colonoscopy every 5 years\par \par F/U in 6-8 weeks.\par She is encouraged to call with any changes, concerns, or questions

## 2023-07-27 NOTE — HISTORY OF PRESENT ILLNESS
[TextBox_4] : Ms. SHETTY  is a 38 year year old female with a history of presenting to the office today for an initial pulmonary evaluation. Her chief complaint is\par \par - In her first pregnancy\par - currently 30 weeks pregnant \par - she notes feeling fine up until 4 weeks ago\par - she notes emesis 4-5 times a day with COVID\par - she notes having heaviness in her chest and SOB\par - she notes not sleeping properly due to chest pressure and breathing issue \par - she notes some coughing and chest tightness when trying to go to sleep \par - she notes SOB while climbing stairs or long walks\par - denies any PND\par - she notes seeing an allergist for her breathing issues in January before her pregnancy\par - she notes congestion, itchy eyes and itchy ears\par - she notes always having heartburn and reflux after eating food \par - she notes losing 9 pounds while pregnant c/w not eating\par - she notes some snoring\par - she notes not sleeping for the past 2 months\par - she notes lower back pain \par - she notes her energy levels could be much better\par - she notes not drinking or eating due to things getting stuck in her throat and when it comes down she gets chest tightness\par - she notes emesis 3 days ago \par - she notes hoarseness and that her tongue is swollen \par - she notes taking Prilosec for 19 years\par - she notes now taking Carophate, Pantoprazole in the morning, Pepcid at night \par - she notes having dry mouth \par -she denies any headaches, nausea, fever, chills, sweats, chest pain, wheezing, palpitations, constipation, diarrhea, vertigo, dysphagia, leg swelling, leg pain, arthralgias, myalgias, or sour taste in the mouth.

## 2023-07-28 ENCOUNTER — NON-APPOINTMENT (OUTPATIENT)
Age: 38
End: 2023-07-28

## 2023-07-29 LAB
CULTURE RESULTS: SIGNIFICANT CHANGE UP
SPECIMEN SOURCE: SIGNIFICANT CHANGE UP

## 2023-08-07 ENCOUNTER — NON-APPOINTMENT (OUTPATIENT)
Age: 38
End: 2023-08-07

## 2023-08-07 ENCOUNTER — APPOINTMENT (OUTPATIENT)
Dept: OBGYN | Facility: CLINIC | Age: 38
End: 2023-08-07

## 2023-08-07 ENCOUNTER — OUTPATIENT (OUTPATIENT)
Dept: INPATIENT UNIT | Facility: HOSPITAL | Age: 38
LOS: 1 days | Discharge: HOME | End: 2023-08-07
Payer: COMMERCIAL

## 2023-08-07 VITALS
SYSTOLIC BLOOD PRESSURE: 112 MMHG | DIASTOLIC BLOOD PRESSURE: 68 MMHG | HEART RATE: 125 BPM | TEMPERATURE: 98 F | RESPIRATION RATE: 17 BRPM

## 2023-08-07 VITALS — SYSTOLIC BLOOD PRESSURE: 115 MMHG | DIASTOLIC BLOOD PRESSURE: 64 MMHG | HEART RATE: 74 BPM

## 2023-08-07 DIAGNOSIS — O26.899 OTHER SPECIFIED PREGNANCY RELATED CONDITIONS, UNSPECIFIED TRIMESTER: ICD-10-CM

## 2023-08-07 DIAGNOSIS — Z98.890 OTHER SPECIFIED POSTPROCEDURAL STATES: Chronic | ICD-10-CM

## 2023-08-07 LAB
ALBUMIN SERPL ELPH-MCNC: 4.1 G/DL — SIGNIFICANT CHANGE UP (ref 3.3–5)
ALP SERPL-CCNC: 183 U/L — HIGH (ref 40–120)
ALT FLD-CCNC: 121 U/L — HIGH (ref 4–33)
AMYLASE P1 CFR SERPL: 89 U/L — SIGNIFICANT CHANGE UP (ref 25–125)
ANION GAP SERPL CALC-SCNC: 19 MMOL/L — HIGH (ref 7–14)
APPEARANCE UR: ABNORMAL
AST SERPL-CCNC: 106 U/L — HIGH (ref 4–32)
BACTERIA # UR AUTO: ABNORMAL /HPF
BASOPHILS # BLD AUTO: 0.04 K/UL — SIGNIFICANT CHANGE UP (ref 0–0.2)
BASOPHILS NFR BLD AUTO: 0.7 % — SIGNIFICANT CHANGE UP (ref 0–2)
BILIRUB SERPL-MCNC: 0.7 MG/DL — SIGNIFICANT CHANGE UP (ref 0.2–1.2)
BILIRUB UR-MCNC: ABNORMAL
BUN SERPL-MCNC: 4 MG/DL — LOW (ref 7–23)
CALCIUM SERPL-MCNC: 9.7 MG/DL — SIGNIFICANT CHANGE UP (ref 8.4–10.5)
CAST: 4 /LPF — SIGNIFICANT CHANGE UP (ref 0–4)
CHLORIDE SERPL-SCNC: 100 MMOL/L — SIGNIFICANT CHANGE UP (ref 98–107)
CO2 SERPL-SCNC: 17 MMOL/L — LOW (ref 22–31)
COLOR SPEC: SIGNIFICANT CHANGE UP
CREAT SERPL-MCNC: 0.6 MG/DL — SIGNIFICANT CHANGE UP (ref 0.5–1.3)
DIFF PNL FLD: NEGATIVE — SIGNIFICANT CHANGE UP
EGFR: 119 ML/MIN/1.73M2 — SIGNIFICANT CHANGE UP
EOSINOPHIL # BLD AUTO: 0.09 K/UL — SIGNIFICANT CHANGE UP (ref 0–0.5)
EOSINOPHIL NFR BLD AUTO: 1.5 % — SIGNIFICANT CHANGE UP (ref 0–6)
GLUCOSE SERPL-MCNC: 83 MG/DL — SIGNIFICANT CHANGE UP (ref 70–99)
GLUCOSE UR QL: NEGATIVE MG/DL — SIGNIFICANT CHANGE UP
HCT VFR BLD CALC: 33.4 % — LOW (ref 34.5–45)
HGB BLD-MCNC: 10.7 G/DL — LOW (ref 11.5–15.5)
IANC: 3.69 K/UL — SIGNIFICANT CHANGE UP (ref 1.8–7.4)
IMM GRANULOCYTES NFR BLD AUTO: 0.7 % — SIGNIFICANT CHANGE UP (ref 0–0.9)
KETONES UR-MCNC: >=160 MG/DL
LEUKOCYTE ESTERASE UR-ACNC: ABNORMAL
LIDOCAIN IGE QN: 112 U/L — HIGH (ref 7–60)
LYMPHOCYTES # BLD AUTO: 1.65 K/UL — SIGNIFICANT CHANGE UP (ref 1–3.3)
LYMPHOCYTES # BLD AUTO: 27 % — SIGNIFICANT CHANGE UP (ref 13–44)
MCHC RBC-ENTMCNC: 27.4 PG — SIGNIFICANT CHANGE UP (ref 27–34)
MCHC RBC-ENTMCNC: 32 GM/DL — SIGNIFICANT CHANGE UP (ref 32–36)
MCV RBC AUTO: 85.4 FL — SIGNIFICANT CHANGE UP (ref 80–100)
MONOCYTES # BLD AUTO: 0.6 K/UL — SIGNIFICANT CHANGE UP (ref 0–0.9)
MONOCYTES NFR BLD AUTO: 9.8 % — SIGNIFICANT CHANGE UP (ref 2–14)
NEUTROPHILS # BLD AUTO: 3.69 K/UL — SIGNIFICANT CHANGE UP (ref 1.8–7.4)
NEUTROPHILS NFR BLD AUTO: 60.3 % — SIGNIFICANT CHANGE UP (ref 43–77)
NITRITE UR-MCNC: NEGATIVE — SIGNIFICANT CHANGE UP
NRBC # BLD: 0 /100 WBCS — SIGNIFICANT CHANGE UP (ref 0–0)
NRBC # FLD: 0 K/UL — SIGNIFICANT CHANGE UP (ref 0–0)
PH UR: 6 — SIGNIFICANT CHANGE UP (ref 5–8)
PLATELET # BLD AUTO: 187 K/UL — SIGNIFICANT CHANGE UP (ref 150–400)
POTASSIUM SERPL-MCNC: 3 MMOL/L — LOW (ref 3.5–5.3)
POTASSIUM SERPL-SCNC: 3 MMOL/L — LOW (ref 3.5–5.3)
PROT SERPL-MCNC: 7.9 G/DL — SIGNIFICANT CHANGE UP (ref 6–8.3)
PROT UR-MCNC: 100 MG/DL
RBC # BLD: 3.91 M/UL — SIGNIFICANT CHANGE UP (ref 3.8–5.2)
RBC # FLD: 15.2 % — HIGH (ref 10.3–14.5)
RBC CASTS # UR COMP ASSIST: 1 /HPF — SIGNIFICANT CHANGE UP (ref 0–4)
REVIEW: SIGNIFICANT CHANGE UP
SODIUM SERPL-SCNC: 136 MMOL/L — SIGNIFICANT CHANGE UP (ref 135–145)
SP GR SPEC: 1.02 — SIGNIFICANT CHANGE UP (ref 1–1.03)
SQUAMOUS # UR AUTO: 26 /HPF — HIGH (ref 0–5)
UROBILINOGEN FLD QL: 1 MG/DL — SIGNIFICANT CHANGE UP (ref 0.2–1)
WBC # BLD: 6.11 K/UL — SIGNIFICANT CHANGE UP (ref 3.8–10.5)
WBC # FLD AUTO: 6.11 K/UL — SIGNIFICANT CHANGE UP (ref 3.8–10.5)
WBC UR QL: 48 /HPF — HIGH (ref 0–5)

## 2023-08-07 PROCEDURE — 99221 1ST HOSP IP/OBS SF/LOW 40: CPT

## 2023-08-07 NOTE — OB PROVIDER TRIAGE NOTE - NSHPPHYSICALEXAM_GEN_ALL_CORE
Vital Signs Last 24 Hrs  T(C): 36.7 (07 Aug 2023 16:10), Max: 36.7 (07 Aug 2023 16:10)  T(F): 98.1 (07 Aug 2023 16:10), Max: 98.1 (07 Aug 2023 16:10)  HR: 71 (07 Aug 2023 18:57) (68 - 125)  BP: 113/66 (07 Aug 2023 18:57) (108/62 - 120/64)  BP(mean): --  RR: 17 (07 Aug 2023 16:10) (17 - 17)  SpO2: --    pt appears pale and grey

## 2023-08-07 NOTE — OB PROVIDER TRIAGE NOTE - NSOBPROVIDERNOTE_OBGYN_ALL_OB_FT
Plan discussed with Dr Rivero  - Cbc, cmp, amylase/ lipase, u/a, urine culture to be sent due to patients current nutrition status  - cervical evaluation to r/o ptl Plan discussed with Dr Rivero  - Cbc, cmp, amylase/ lipase, u/a, urine culture to be sent due to patients current nutrition status  - cervical evaluation to r/o ptl  - TAS Plan discussed with Dr Rivero  - Cbc, cmp, amylase/ lipase, u/a, urine culture to be sent due to patients current nutrition status  - cervical evaluation to r/o ptl  - TAS    Patient discussed and labs reviewed with Dr Rivero  As per Dr Rivero patient to be discharged home with instructions to follow up with Dr Mcgarry in AM.  Written and verbal instructions given to patient  Monitor Fetal kick counts   Return to triage if symptoms worsened

## 2023-08-07 NOTE — OB PROVIDER TRIAGE NOTE - HISTORY OF PRESENT ILLNESS
This is a 38 year old  at 32.5 weeks gestational age presents with complaints of decreased fetal movement and abdominal tightening. Pt denies cramping or contractions. Pt also reporting feeling fetal movement since arriving to triage room. Pt denies LOF or VB.   Pt reporting being  Covid + 6 weeks ago ( )  and since that time has been experiencing tongue swelling and inability to swallow solid foods. She is unable to tolerate foods that are not pureed. Today at 1430 she had a small amount of pureed chicken+ rice+ vegetables, otherwise has not eaten or drank since yesterday. She states she lost 10 lbs in 2 weeks. She is also reporting light-headedness and weakness yesterday, did not have loss of consciousness, however felt " faint". Denies nausea/ vomiting diarrhea, fever, chills, cough, palpitations. Pt states she has been to the ED x 3 times since being sick with Covid. As per Dr Mcgarry recommendation pt was seen by Pulmonology, cardiology, ENT and GI for these post Covid complaints.  Pts family member is concerned about pregnancy and patient due to inability to tolerate food, weight loss and generalized weakness.   Pt had scheduled appointment with Dr Mcgarry today to follow up on these symptoms, however came to triage in lieu of appointment bc of decreased fetal movement     AP course:  - poor fetal growth, efw 3% ( 1320g on )  - AMA  - Long Covid  - low weight gain in pregnancy  - Anemia ( H/H 8.6/27.3 )  - transaminitis ( AST 84/ ALT 70 )     HIE reviewed  () ATU sonogram violeta breech, anterior placenta no previa, greyson 13.24, bpp 8/, efw 1320g 2#15 3%  () Pulm Dr Eduardo betancourt- likely post Covid RADs, prescribed Budesonide,  Pulmicort nebulizer, GERD recommended Pepcid and Gaviscon  Cardiology/ ENT/ GI consults not available to review in HIE

## 2023-08-08 ENCOUNTER — APPOINTMENT (OUTPATIENT)
Dept: OBGYN | Facility: CLINIC | Age: 38
End: 2023-08-08
Payer: COMMERCIAL

## 2023-08-08 ENCOUNTER — NON-APPOINTMENT (OUTPATIENT)
Age: 38
End: 2023-08-08

## 2023-08-08 VITALS
SYSTOLIC BLOOD PRESSURE: 106 MMHG | DIASTOLIC BLOOD PRESSURE: 73 MMHG | BODY MASS INDEX: 23.4 KG/M2 | WEIGHT: 119.2 LBS | HEIGHT: 60 IN | HEART RATE: 102 BPM

## 2023-08-08 PROCEDURE — 0502F SUBSEQUENT PRENATAL CARE: CPT

## 2023-08-09 DIAGNOSIS — O99.613 DISEASES OF THE DIGESTIVE SYSTEM COMPLICATING PREGNANCY, THIRD TRIMESTER: ICD-10-CM

## 2023-08-09 DIAGNOSIS — O36.8130 DECREASED FETAL MOVEMENTS, THIRD TRIMESTER, NOT APPLICABLE OR UNSPECIFIED: ICD-10-CM

## 2023-08-09 DIAGNOSIS — O26.893 OTHER SPECIFIED PREGNANCY RELATED CONDITIONS, THIRD TRIMESTER: ICD-10-CM

## 2023-08-09 DIAGNOSIS — K21.9 GASTRO-ESOPHAGEAL REFLUX DISEASE WITHOUT ESOPHAGITIS: ICD-10-CM

## 2023-08-09 DIAGNOSIS — D64.9 ANEMIA, UNSPECIFIED: ICD-10-CM

## 2023-08-09 DIAGNOSIS — R42 DIZZINESS AND GIDDINESS: ICD-10-CM

## 2023-08-09 DIAGNOSIS — R53.1 WEAKNESS: ICD-10-CM

## 2023-08-09 DIAGNOSIS — O99.013 ANEMIA COMPLICATING PREGNANCY, THIRD TRIMESTER: ICD-10-CM

## 2023-08-09 DIAGNOSIS — O09.523 SUPERVISION OF ELDERLY MULTIGRAVIDA, THIRD TRIMESTER: ICD-10-CM

## 2023-08-09 DIAGNOSIS — Z86.16 PERSONAL HISTORY OF COVID-19: ICD-10-CM

## 2023-08-09 DIAGNOSIS — Z3A.32 32 WEEKS GESTATION OF PREGNANCY: ICD-10-CM

## 2023-08-09 DIAGNOSIS — O09.293 SUPERVISION OF PREGNANCY WITH OTHER POOR REPRODUCTIVE OR OBSTETRIC HISTORY, THIRD TRIMESTER: ICD-10-CM

## 2023-08-09 LAB
CULTURE RESULTS: SIGNIFICANT CHANGE UP
SPECIMEN SOURCE: SIGNIFICANT CHANGE UP

## 2023-08-11 ENCOUNTER — APPOINTMENT (OUTPATIENT)
Dept: MATERNAL FETAL MEDICINE | Facility: CLINIC | Age: 38
End: 2023-08-11

## 2023-08-11 ENCOUNTER — INPATIENT (INPATIENT)
Facility: HOSPITAL | Age: 38
LOS: 27 days | Discharge: HOME | End: 2023-09-08
Attending: OBSTETRICS & GYNECOLOGY | Admitting: OBSTETRICS & GYNECOLOGY
Payer: COMMERCIAL

## 2023-08-11 VITALS — SYSTOLIC BLOOD PRESSURE: 131 MMHG | HEART RATE: 99 BPM | DIASTOLIC BLOOD PRESSURE: 91 MMHG

## 2023-08-11 DIAGNOSIS — O26.899 OTHER SPECIFIED PREGNANCY RELATED CONDITIONS, UNSPECIFIED TRIMESTER: ICD-10-CM

## 2023-08-11 DIAGNOSIS — Z98.890 OTHER SPECIFIED POSTPROCEDURAL STATES: Chronic | ICD-10-CM

## 2023-08-11 NOTE — OB RN PATIENT PROFILE - NSSTATEDREASONFORADM_OBGYN_A_OB_FT
ante/malnutrition: COVID 6/23 since then throat problems, difficult breathing, went to ENT & GI, had swallow test antepartum/malnutrition: COVID 6/23 since then throat problems, difficult breathing, went to ENT & GI, had swallow test, GERD

## 2023-08-11 NOTE — OB RN PATIENT PROFILE - FALL HARM RISK - UNIVERSAL INTERVENTIONS
Bed in lowest position, wheels locked, appropriate side rails in place/Call bell, personal items and telephone in reach/Instruct patient to call for assistance before getting out of bed or chair/Non-slip footwear when patient is out of bed/[place] to call system/Physically safe environment - no spills, clutter or unnecessary equipment/Purposeful Proactive Rounding/Room/bathroom lighting operational, light cord in reach

## 2023-08-11 NOTE — OB RN PATIENT PROFILE - CURRENT PREGNANCY COMPLICATIONS, OB PROFILE
Malnutrition/Intrauterine Growth Restriction/Maternal Unknown GBS Malnutrition/Gestational Age less than 36 Weeks/Intrauterine Growth Restriction/Maternal Unknown GBS

## 2023-08-12 ENCOUNTER — NON-APPOINTMENT (OUTPATIENT)
Age: 38
End: 2023-08-12

## 2023-08-12 LAB
ALBUMIN SERPL ELPH-MCNC: 4 G/DL — SIGNIFICANT CHANGE UP (ref 3.3–5)
ALP SERPL-CCNC: 171 U/L — HIGH (ref 40–120)
ALT FLD-CCNC: 123 U/L — HIGH (ref 4–33)
AMYLASE P1 CFR SERPL: 83 U/L — SIGNIFICANT CHANGE UP (ref 25–125)
ANION GAP SERPL CALC-SCNC: 16 MMOL/L — HIGH (ref 7–14)
APPEARANCE UR: ABNORMAL
AST SERPL-CCNC: 111 U/L — HIGH (ref 4–32)
BACTERIA # UR AUTO: ABNORMAL /HPF
BASOPHILS # BLD AUTO: 0.06 K/UL — SIGNIFICANT CHANGE UP (ref 0–0.2)
BASOPHILS NFR BLD AUTO: 1 % — SIGNIFICANT CHANGE UP (ref 0–2)
BILIRUB SERPL-MCNC: 0.6 MG/DL — SIGNIFICANT CHANGE UP (ref 0.2–1.2)
BILIRUB UR-MCNC: NEGATIVE — SIGNIFICANT CHANGE UP
BLD GP AB SCN SERPL QL: NEGATIVE — SIGNIFICANT CHANGE UP
BUN SERPL-MCNC: 4 MG/DL — LOW (ref 7–23)
CALCIUM SERPL-MCNC: 9.6 MG/DL — SIGNIFICANT CHANGE UP (ref 8.4–10.5)
CAST: 0 /LPF — SIGNIFICANT CHANGE UP (ref 0–4)
CHLORIDE SERPL-SCNC: 98 MMOL/L — SIGNIFICANT CHANGE UP (ref 98–107)
CO2 SERPL-SCNC: 20 MMOL/L — LOW (ref 22–31)
COLOR SPEC: YELLOW — SIGNIFICANT CHANGE UP
COVID-19 SPIKE DOMAIN AB INTERP: POSITIVE
COVID-19 SPIKE DOMAIN ANTIBODY RESULT: >250 U/ML — HIGH
CREAT SERPL-MCNC: 0.51 MG/DL — SIGNIFICANT CHANGE UP (ref 0.5–1.3)
DIFF PNL FLD: NEGATIVE — SIGNIFICANT CHANGE UP
EGFR: 124 ML/MIN/1.73M2 — SIGNIFICANT CHANGE UP
EOSINOPHIL # BLD AUTO: 0.06 K/UL — SIGNIFICANT CHANGE UP (ref 0–0.5)
EOSINOPHIL NFR BLD AUTO: 1 % — SIGNIFICANT CHANGE UP (ref 0–6)
GLUCOSE SERPL-MCNC: 87 MG/DL — SIGNIFICANT CHANGE UP (ref 70–99)
GLUCOSE UR QL: NEGATIVE MG/DL — SIGNIFICANT CHANGE UP
HCT VFR BLD CALC: 34.4 % — LOW (ref 34.5–45)
HGB BLD-MCNC: 11 G/DL — LOW (ref 11.5–15.5)
IANC: 3.41 K/UL — SIGNIFICANT CHANGE UP (ref 1.8–7.4)
IMM GRANULOCYTES NFR BLD AUTO: 0.5 % — SIGNIFICANT CHANGE UP (ref 0–0.9)
KETONES UR-MCNC: >=160 MG/DL
LEUKOCYTE ESTERASE UR-ACNC: ABNORMAL
LIDOCAIN IGE QN: 94 U/L — HIGH (ref 7–60)
LYMPHOCYTES # BLD AUTO: 2.01 K/UL — SIGNIFICANT CHANGE UP (ref 1–3.3)
LYMPHOCYTES # BLD AUTO: 33.1 % — SIGNIFICANT CHANGE UP (ref 13–44)
MCHC RBC-ENTMCNC: 26.8 PG — LOW (ref 27–34)
MCHC RBC-ENTMCNC: 32 GM/DL — SIGNIFICANT CHANGE UP (ref 32–36)
MCV RBC AUTO: 83.7 FL — SIGNIFICANT CHANGE UP (ref 80–100)
MONOCYTES # BLD AUTO: 0.51 K/UL — SIGNIFICANT CHANGE UP (ref 0–0.9)
MONOCYTES NFR BLD AUTO: 8.4 % — SIGNIFICANT CHANGE UP (ref 2–14)
NEUTROPHILS # BLD AUTO: 3.41 K/UL — SIGNIFICANT CHANGE UP (ref 1.8–7.4)
NEUTROPHILS NFR BLD AUTO: 56 % — SIGNIFICANT CHANGE UP (ref 43–77)
NITRITE UR-MCNC: NEGATIVE — SIGNIFICANT CHANGE UP
NRBC # BLD: 0 /100 WBCS — SIGNIFICANT CHANGE UP (ref 0–0)
NRBC # FLD: 0 K/UL — SIGNIFICANT CHANGE UP (ref 0–0)
PH UR: 6 — SIGNIFICANT CHANGE UP (ref 5–8)
PLATELET # BLD AUTO: 172 K/UL — SIGNIFICANT CHANGE UP (ref 150–400)
POTASSIUM SERPL-MCNC: 3.2 MMOL/L — LOW (ref 3.5–5.3)
POTASSIUM SERPL-SCNC: 3.2 MMOL/L — LOW (ref 3.5–5.3)
PROT SERPL-MCNC: 7.7 G/DL — SIGNIFICANT CHANGE UP (ref 6–8.3)
PROT UR-MCNC: SIGNIFICANT CHANGE UP MG/DL
RBC # BLD: 4.11 M/UL — SIGNIFICANT CHANGE UP (ref 3.8–5.2)
RBC # FLD: 15.2 % — HIGH (ref 10.3–14.5)
RBC CASTS # UR COMP ASSIST: 1 /HPF — SIGNIFICANT CHANGE UP (ref 0–4)
RH IG SCN BLD-IMP: POSITIVE — SIGNIFICANT CHANGE UP
RH IG SCN BLD-IMP: POSITIVE — SIGNIFICANT CHANGE UP
SARS-COV-2 IGG+IGM SERPL QL IA: >250 U/ML — HIGH
SARS-COV-2 IGG+IGM SERPL QL IA: POSITIVE
SODIUM SERPL-SCNC: 134 MMOL/L — LOW (ref 135–145)
SP GR SPEC: 1.01 — SIGNIFICANT CHANGE UP (ref 1–1.03)
SQUAMOUS # UR AUTO: 16 /HPF — HIGH (ref 0–5)
T PALLIDUM AB TITR SER: NEGATIVE — SIGNIFICANT CHANGE UP
UROBILINOGEN FLD QL: 1 MG/DL — SIGNIFICANT CHANGE UP (ref 0.2–1)
WBC # BLD: 6.08 K/UL — SIGNIFICANT CHANGE UP (ref 3.8–10.5)
WBC # FLD AUTO: 6.08 K/UL — SIGNIFICANT CHANGE UP (ref 3.8–10.5)
WBC UR QL: 28 /HPF — HIGH (ref 0–5)

## 2023-08-12 PROCEDURE — 99222 1ST HOSP IP/OBS MODERATE 55: CPT | Mod: 25

## 2023-08-12 PROCEDURE — 76700 US EXAM ABDOM COMPLETE: CPT | Mod: 26

## 2023-08-12 PROCEDURE — 99223 1ST HOSP IP/OBS HIGH 75: CPT

## 2023-08-12 RX ORDER — FOLIC ACID 0.8 MG
1 TABLET ORAL DAILY
Refills: 0 | Status: DISCONTINUED | OUTPATIENT
Start: 2023-08-12 | End: 2023-08-22

## 2023-08-12 RX ORDER — OXYTOCIN 10 UNIT/ML
333.33 VIAL (ML) INJECTION
Qty: 20 | Refills: 0 | Status: DISCONTINUED | OUTPATIENT
Start: 2023-08-12 | End: 2023-08-14

## 2023-08-12 RX ORDER — POTASSIUM CHLORIDE 20 MEQ
10 PACKET (EA) ORAL
Refills: 0 | Status: COMPLETED | OUTPATIENT
Start: 2023-08-12 | End: 2023-08-12

## 2023-08-12 RX ORDER — CHLORHEXIDINE GLUCONATE 213 G/1000ML
1 SOLUTION TOPICAL DAILY
Refills: 0 | Status: DISCONTINUED | OUTPATIENT
Start: 2023-08-12 | End: 2023-08-14

## 2023-08-12 RX ORDER — CITRIC ACID/SODIUM CITRATE 300-500 MG
15 SOLUTION, ORAL ORAL EVERY 6 HOURS
Refills: 0 | Status: DISCONTINUED | OUTPATIENT
Start: 2023-08-12 | End: 2023-08-14

## 2023-08-12 RX ORDER — HEPARIN SODIUM 5000 [USP'U]/ML
5000 INJECTION INTRAVENOUS; SUBCUTANEOUS EVERY 12 HOURS
Refills: 0 | Status: DISCONTINUED | OUTPATIENT
Start: 2023-08-12 | End: 2023-08-15

## 2023-08-12 RX ORDER — SODIUM CHLORIDE 9 MG/ML
1000 INJECTION, SOLUTION INTRAVENOUS
Refills: 0 | Status: DISCONTINUED | OUTPATIENT
Start: 2023-08-12 | End: 2023-08-13

## 2023-08-12 RX ORDER — POTASSIUM CHLORIDE 20 MEQ
10 PACKET (EA) ORAL
Refills: 0 | Status: DISCONTINUED | OUTPATIENT
Start: 2023-08-12 | End: 2023-08-12

## 2023-08-12 RX ORDER — PANTOPRAZOLE SODIUM 20 MG/1
40 TABLET, DELAYED RELEASE ORAL
Refills: 0 | Status: DISCONTINUED | OUTPATIENT
Start: 2023-08-12 | End: 2023-08-12

## 2023-08-12 RX ORDER — PANTOPRAZOLE SODIUM 20 MG/1
40 TABLET, DELAYED RELEASE ORAL EVERY 12 HOURS
Refills: 0 | Status: DISCONTINUED | OUTPATIENT
Start: 2023-08-12 | End: 2023-09-03

## 2023-08-12 RX ADMIN — SODIUM CHLORIDE 125 MILLILITER(S): 9 INJECTION, SOLUTION INTRAVENOUS at 09:11

## 2023-08-12 RX ADMIN — HEPARIN SODIUM 5000 UNIT(S): 5000 INJECTION INTRAVENOUS; SUBCUTANEOUS at 09:10

## 2023-08-12 RX ADMIN — SODIUM CHLORIDE 125 MILLILITER(S): 9 INJECTION, SOLUTION INTRAVENOUS at 03:58

## 2023-08-12 RX ADMIN — Medication 100 MILLIEQUIVALENT(S): at 05:11

## 2023-08-12 RX ADMIN — SODIUM CHLORIDE 125 MILLILITER(S): 9 INJECTION, SOLUTION INTRAVENOUS at 01:54

## 2023-08-12 RX ADMIN — Medication 100 MILLIEQUIVALENT(S): at 03:58

## 2023-08-12 RX ADMIN — Medication 100 MILLIEQUIVALENT(S): at 06:12

## 2023-08-12 RX ADMIN — HEPARIN SODIUM 5000 UNIT(S): 5000 INJECTION INTRAVENOUS; SUBCUTANEOUS at 21:29

## 2023-08-12 RX ADMIN — PANTOPRAZOLE SODIUM 40 MILLIGRAM(S): 20 TABLET, DELAYED RELEASE ORAL at 14:19

## 2023-08-12 NOTE — CONSULT NOTE ADULT - SUBJECTIVE AND OBJECTIVE BOX
HPI:  39 y/o  33w3d presents for 2 days of complete inability to tolerate PO. +FM. -LOF. -CTXs. -VB. Pt denies any other concerns. Patient states that after getting COVID about one month and a half ago she started having difficulty swallowing. Denies choking, inability to swallow or food being stuck in her throat. She has been eating pureed food for the past few weeks but experiences throat tightness and frequent burping after swallowing. She has only been able to swallow water overnight, denies N/V. She feels hungry. States that she has seen multiple specialists including an ENT who stated that she had some mild irritation of the throat and a normal swallow study. She has had an 11 lb weight loss in the past two weeks. Denies fevers, chills. States that she feels ferny-umbilical pain and is on omeprazole.   Patient states she has difficulty initiating the swallow and has globus sensation in her chest, behind the sternum, after swallowing  Denies aspiration   Denies difficulty breathing   Patient has been vomiting       Physical Exam  T(C): 36.8 (23 @ 09:00), Max: 36.9 (23 @ 23:34)  HR: 72 (23 @ 12:25) (61 - 99)  BP: 115/70 (23 @ 11:33) (97/61 - 133/75)  RR: 17 (23 @ 07:00) (16 - 17)  SpO2: 100% (23 @ 12:25) (78% - 100%)  General: NAD, A+Ox3  No respiratory distress, stridor, or stertor  Voice quality: normal  Face:  Symmetric without masses or lesions  OU: PERRL, EOMI  AD: Pinna wnl,   AS: Pinna wnl,  Nose: nasal cavity clear bilaterally, inferior turbinates normal, mucosa normal without crusting or bleeding  OC/OP: tongue normal, floor of mouth wnl, no masses or lesions, OP clear  Neck: soft/flat, no LAD  CNII-XII intact    Procedure: Flexible laryngoscopy    Pre-procedure diagnosis/Indication for procedure: To evaluate larynx    Anesthesia: None    Description:    A flexible endoscope was used to examine the left and right nasal cavities, posterior oropharynx, hypopharynx, and larynx.The oropharynx, tongue base/vallecula, epiglottis, aryepiglottic folds, arytenoids, vocal cords, hypopharynx were also inspected for swelling, inflammation, or dysfunction. The patient tolerated the procedure without complications.    Findings:    NP wnl  BOT/vallecula normal  Epiglottis sharp  AE folds nonedematous, mildly erythematous   Arytenoids mobile  Vocal folds mobile bilaterally  No masses or lesions visualized in post cricoid space or pyriform sinuses bilaterally      Assessmnet and Plan"   38y year old Female with dysphagia for the past 2 weeks. FOE: wnl, mild irritation   -No acute ENT intervention  -f/u GI   -care per primary team  -f/u ENT outpatient as needed [phone]

## 2023-08-12 NOTE — CONSULT NOTE ADULT - SUBJECTIVE AND OBJECTIVE BOX
HPI:  39 y/o  33w3d presents with intolerance of po. She states she has had worsening dysphagia since the end of  when she was sick with covid. Since that time, she feels both solids and liquids getting stuck in her chest. Patient states the sensation is in her chest and throat. She states she occasionally regurgitates liquids but not solids, although she is eating less on account of this sensation. Denies odynophagia, melena, nausea, vomiting, abdominal pain. She has a long time history of GERD, for which she has tried different agents with varying effect. Her GERD has been more severe during her pregnancy. She takes daily omeprazole and gavison with continued breakthrough symptoms. She had an EGD in the remote past, reportedly unremarkable. She has seen 3 different GIs for the dysphagia during her pregnancy. She also saw an ENT. She has lost 11 lbs in the past 2 weeks.    Allergies:  No Known Drug Allergies  shellfish (Hives)    Home Medications:  aspirin 81 mg oral capsule: 1 orally 2 alt with 1 po QOD (11 Aug 2023 23:49)  gaviscon:  (11 Aug 2023 23:49)  omeprazole:  (11 Aug 2023 23:49)  Pepcid:  orally  (11 Aug 2023 23:49)  PriLOSEC 20 mg oral delayed release capsule: 1 cap(s) orally once a day (11 Aug 2023 23:49)    Hospital Medications:  chlorhexidine 2% Cloths 1 Application(s) Topical daily  citric acid/sodium citrate Solution 15 milliLiter(s) Oral every 6 hours  dextrose 5% + sodium chloride 0.9%. 1000 milliLiter(s) IV Continuous <Continuous>  folic acid 1 milliGRAM(s) Oral daily  heparin   Injectable 5000 Unit(s) SubCutaneous every 12 hours  lactated ringers. 1000 milliLiter(s) IV Continuous <Continuous>  oxytocin Infusion 333.333 milliUNIT(s)/Min IV Continuous <Continuous>  pantoprazole  Injectable 40 milliGRAM(s) IV Push every 12 hours  prenatal multivitamin 1 Tablet(s) Oral daily    PMHX/PSHX:  GERD (gastroesophageal reflux disease)    Acid reflux    No significant past surgical history    History of dilation and curettage        Family history:  No pertinent family history in first degree relatives    Family history of diabetes mellitus (DM) (Mother)    Family history of hypertension (Mother, Father)    Family history of stroke (Aunt)        Denies family history of colon cancer/polyps, stomach cancer/polyps, pancreatic cancer/masses, liver cancer/disease, ovarian cancer and endometrial cancer.    Social History:   Tob: Denies  EtOH: Denies  Illicit Drugs: Denies    ROS:     General:  No wt loss, fevers, chills, night sweats, fatigue  Eyes:  Good vision, no reported pain  ENT:  No sore throat, pain, runny nose, dysphagia  CV:  No pain, palpitations, hypo/hypertension  Pulm:  No dyspnea, cough, tachypnea, wheezing  GI:  see HPI  :  No pain, bleeding, incontinence, nocturia  Muscle:  No pain, weakness  Neuro:  No weakness, tingling, memory problems  Psych:  No fatigue, insomnia, mood problems, depression  Endocrine:  No polyuria, polydipsia, cold/heat intolerance  Heme:  No petechiae, ecchymosis, easy bruisability  Skin:  No rash, tattoos, scars, edema    PHYSICAL EXAM:   GENERAL:  No acute distress  HEENT:  NCAT, no scleral icterus   CHEST:  no respiratory distress  HEART:  Regular rate and rhythm  ABDOMEN:  Soft, non-tender, non-distended  EXTREMITIES: No edema  SKIN:  No rash/erythema/ecchymoses   NEURO:  Alert and oriented x 3    Vital Signs:  Vital Signs Last 24 Hrs  T(C): 36.8 (12 Aug 2023 09:00), Max: 36.9 (11 Aug 2023 23:34)  T(F): 98.24 (12 Aug 2023 09:00), Max: 98.4 (11 Aug 2023 23:34)  HR: 64 (12 Aug 2023 14:07) (60 - 99)  BP: 140/62 (12 Aug 2023 13:37) (97/61 - 140/62)  BP(mean): --  RR: 17 (12 Aug 2023 07:00) (16 - 17)  SpO2: 100% (12 Aug 2023 14:07) (78% - 100%)    Parameters below as of 11 Aug 2023 23:34  Patient On (Oxygen Delivery Method): room air      Daily Height in cm: 167.64 (11 Aug 2023 23:34)    Daily Weight Pre-pregnancy in k (11 Aug 2023 23:34)    LABS:                        11.0   6.08  )-----------( 172      ( 12 Aug 2023 01:00 )             34.4     Mean Cell Volume: 83.7 fL (- @ 01:00)        134<L>  |  98  |  4<L>  ----------------------------<  87  3.2<L>   |  20<L>  |  0.51    Ca    9.6      12 Aug 2023 01:00    TPro  7.7  /  Alb  4.0  /  TBili  0.6  /  DBili  x   /  AST  111<H>  /  ALT  123<H>  /  AlkPhos  171<H>      LIVER FUNCTIONS - ( 12 Aug 2023 01:00 )  Alb: 4.0 g/dL / Pro: 7.7 g/dL / ALK PHOS: 171 U/L / ALT: 123 U/L / AST: 111 U/L / GGT: x             Urinalysis Basic - ( 12 Aug 2023 01:00 )    Color: Yellow / Appearance: Cloudy / S.012 / pH: x  Gluc: 87 mg/dL / Ketone: >=160 mg/dL  / Bili: Negative / Urobili: 1.0 mg/dL   Blood: x / Protein: Trace mg/dL / Nitrite: Negative   Leuk Esterase: Moderate / RBC: 1 /HPF / WBC 28 /HPF   Sq Epi: x / Non Sq Epi: 16 /HPF / Bacteria: Few /HPF      Amylase Serum83      Lipase serum94       Ammonia--                          11.0   6.08  )-----------( 172      ( 12 Aug 2023 01:00 )             34.4       Imaging:  reviewed

## 2023-08-12 NOTE — CONSULT NOTE ADULT - ATTENDING COMMENTS
Patient without pertinent medical or surgical history who present with monthlong dysphagia s/p COVID infection.  Report there has been little improvement and so far the workup has been non contributory (ENT and outpatient GI consults).  Over the past 2 weeks the patient lost 11 lbs and while was able to tolerate pureed foods before can no longer tolerate that.    Agree with placed GI consult.  If endoscopy is needed, pregnancy is NOT a contraindication.  Recommend RUQ US - patient with mild transaminitis and epigastric discomfort to palpation.   Daily labs to trend electrolytes.    Twice daily NST while inpatient.  Follow up growth scan next week.

## 2023-08-12 NOTE — OB PROVIDER H&P - HISTORY OF PRESENT ILLNESS
Admission H&P    Subjective  HPI: yoF GP gestational age presents for _. +FM. -LOF. -CTXs. -VB. Pt denies any other concerns.    – PNC: Denies prenatal issues. GBS _.  EFW _g by sono.  – OBHx: SAB x3 w/ 2 D&Cs  – GynHx: denies  – PMH: denies  – PSH: denies  – Psych: denies   – Social: denies   – Meds: PNV   – Allergies: NKDA  – Will accept blood transfusions? Yes    Objective  – Vital Signs  Vital Signs Last 24 Hrs  T(C): 36.9 (23 @ 23:34), Max: 36.9 (23 @ 23:34)  T(F): 98.4 (23 @ 23:34), Max: 98.4 (23 @ 23:34)  HR: 71 (23 @ 01:16) (71 - 99)  BP: 113/71 (23 @ 00:33) (113/71 - 131/91)  BP(mean): --  RR: 16 (23 @ 23:34) (16 - 16)  SpO2: 100% (23 @ 01:16) (88% - 100%)  – PE:   CV: RRR  Pulm: breathing comfortably on RA  Abd: gravid, nontender  Extr: moving all extremities with ease  – FHT: baseline 120, mod variability, +accels, -decels  – [place]: q10min  – EFW: 1320g by sono  – Sono: vertex    Assessment  39yo  @33w3d admitted for inpatient management of weight loss in pregnancy with limited PO intake. Patient clinically stable without complaints.     Plan  1. Malnutrition  - CBC, CMP, UA, Amylase, Lipase ordered  - Reg diet as tolerated  - D5/NS@125  - Antiemetics PRN  - GI and Nutrition consults placed  - Component of anxiety suspected, SW consult placed    2. IUGR/Fetal Wellbeing  - ATU(): violeta breech, anterior, LIDIA 13.24, BPP 8/8, 1320g(3%, AC 2%)   - Repeat ATU sono in AM  - NST BID  - f/u GBS()    3. Maternal Wellbeing  - Continue PNV, Folate  - HSQ for DVT ppx    Patient discussed with attending physician, Dr. Zeferino Cooper MD PGY4 Admission H&P    Subjective  HPI: 39 y/o  33w3d presents for 2 days of complete inability to tolerate PO. +FM. -LOF. -CTXs. -VB. Pt denies any other concerns.    Patient states that after getting COVID about one month and a half ago she started having difficulty swallowing. Notes two days of inability to tolerate any oral intake. Notes 11 lb weight loss in the past two weeks.    – PNC: Denies prenatal issues. GBS _.  EFW _g by sono.  – OBHx: SAB x3 w/ 2 D&Cs  – GynHx: denies  – PMH: denies  – PSH: denies  – Psych: denies   – Social: denies   – Meds: PNV   – Allergies: NKDA  – Will accept blood transfusions? Yes    Objective  – Vital Signs  Vital Signs Last 24 Hrs  T(C): 36.9 (23 @ 23:34), Max: 36.9 (23 @ 23:34)  T(F): 98.4 (23 @ 23:34), Max: 98.4 (23 @ 23:34)  HR: 71 (23 @ 01:16) (71 - 99)  BP: 113/71 (23 @ 00:33) (113/71 - 131/91)  BP(mean): --  RR: 16 (23 @ 23:34) (16 - 16)  SpO2: 100% (23 @ 01:16) (88% - 100%)  – PE:   CV: RRR  Pulm: breathing comfortably on RA  Abd: gravid, nontender  Extr: moving all extremities with ease  – FHT: baseline 120, mod variability, +accels, -decels  – [place]: q10min  – EFW: 1320g by sono  – Sono: vertex    Assessment  39yo  @33w3d admitted for inpatient management of weight loss in pregnancy with limited PO intake. Patient clinically stable without complaints.     Plan  1. Malnutrition  - CBC, CMP, UA, Amylase, Lipase ordered  - Reg diet as tolerated  - D5/NS@125  - Antiemetics PRN  - GI and Nutrition consults placed  - Component of anxiety suspected, SW consult placed    2. IUGR/Fetal Wellbeing  - ATU(): violeta breech, anterior, LIDIA 13.24, BPP , 1320g(3%, AC 2%)   - Repeat ATU sono in AM  - NST BID  - f/u GBS()    3. Maternal Wellbeing  - Continue PNV, Folate  - HSQ for DVT ppx    Patient discussed with attending physician, Dr. Zeferino Cooper MD PGY4 Admission H&P    Subjective  HPI: 37 y/o  33w3d presents for 2 days of complete inability to tolerate PO. +FM. -LOF. -CTXs. -VB. Pt denies any other concerns.    Patient states that after getting COVID about one month and a half ago she started having difficulty swallowing. States that she has seen multiple specialists including an ENT who stated that she had some mild irritation of the throat. Notes two days of inability to tolerate any oral intake. Notes 11 lb weight loss in the past two weeks. Denies nausea, vomiting, fevers, chills. States that she feels abdominal soreness.    – PNC: Denies prenatal issues. GBS unknown.  – OBHx: MABx2 s/p D&Cx2, SABx1  – GynHx: denies  – PMH: denies  – PSH: D&Cx2  – Psych: denies   – Social: denies   – Meds: Omeprazole, Vitamins  – Allergies: NKDA  – Will accept blood transfusions? Yes    Objective  – Vital Signs  Vital Signs Last 24 Hrs  T(C): 36.9 (23 @ 23:34), Max: 36.9 (23 @ 23:34)  T(F): 98.4 (23 @ 23:34), Max: 98.4 (23 @ 23:34)  HR: 71 (23 @ 01:16) (71 - 99)  BP: 113/71 (23 @ 00:33) (113/71 - 131/91)  BP(mean): --  RR: 16 (23 @ 23:34) (16 - 16)  SpO2: 100% (23 @ 01:16) (88% - 100%)  – PE:   CV: RRR  Pulm: breathing comfortably on RA  Abd: gravid, nontender  Extr: moving all extremities with ease  – FHT: baseline 120, mod variability, +accels, -decels  – [place]: q10min  – EFW: 1320g by sono  – Sono: breech, anterior placenta Admission H&P    Subjective  HPI: 37 y/o  33w3d presents for 2 days of complete inability to tolerate PO. +FM. -LOF. -CTXs. -VB. Pt denies any other concerns.    Patient states that after getting COVID about one month and a half ago she started having difficulty swallowing. States that she has seen multiple specialists including an ENT who stated that she had some mild irritation of the throat. Notes two days of inability to tolerate any oral intake. Notes 11 lb weight loss in the past two weeks. Denies nausea, vomiting, fevers, chills. States that she feels abdominal soreness.    – PNC: Denies prenatal issues. GBS unknown. EFW 1320g from .  – OBHx: MABx2 s/p D&Cx2, SABx1  – GynHx: denies  – PMH: denies  – PSH: D&Cx2  – Psych: denies   – Social: denies   – Meds: Omeprazole, Vitamins  – Allergies: NKDA  – Will accept blood transfusions? Yes    Objective  – Vital Signs  Vital Signs Last 24 Hrs  T(C): 36.9 (23 @ 23:34), Max: 36.9 (23 @ 23:34)  T(F): 98.4 (23 @ 23:34), Max: 98.4 (23 @ 23:34)  HR: 71 (23 @ 01:16) (71 - 99)  BP: 113/71 (23 @ 00:33) (113/71 - 131/91)  BP(mean): --  RR: 16 (23 @ 23:34) (16 - 16)  SpO2: 100% (23 @ 01:16) (88% - 100%)  – PE:   CV: RRR  Pulm: breathing comfortably on RA  Abd: gravid, nontender  Extr: moving all extremities with ease  – FHT: baseline 120, mod variability, +accels, -decels  – [place]: q10min  – EFW: 1320g by sono  – Sono: breech, anterior placenta

## 2023-08-12 NOTE — CONSULT NOTE ADULT - ATTENDING COMMENTS
39 yo F currently 33 weeks pregnant with h/o of intermittent GERD not fully controlled on H2RA/PPI in past now admitted to [facility] with progressive dysphagia to solids/liquids (points to mid chest), regurgitation, heartburn with some globus/supragastric belching (points to neck) and now 10 lb weight loss.  Not responding to PPI at home, though is breaking open capsule.  No thrush on exam.  Ddx: Severe GERD, stricture, dysmotility, or functional d/o.  Semi solids/liquids going down eventually and now limited to this.  Of largest concern is worsening weight loss.    - start iv ppi bid, h2ra 1-2x/day, and c/w gaviscon  - Would clarify with radiology if able to do adequate barium esophagram with pregnancy and protection of fetus (unclear if able to)  - If no response to above meds, may need endoscopy +/- endoflip at same time  - from gi standpoint, there is no contraindication to TPN if indicated given weight loss    Patient is willing to consider EGD if needed, but wishes to talk with anesthesia and .  Will f/u to answer questions that may arise 37 yo F currently 33 weeks pregnant with h/o of intermittent GERD not fully controlled on H2RA/PPI in past now admitted to [facility] with progressive dysphagia to solids/liquids (points to mid chest), regurgitation, heartburn with some globus/supragastric belching (points to neck) and now 10 lb weight loss.  Not responding to PPI at home, though is breaking open capsule.  No thrush on exam.  Ddx: Severe GERD, stricture, dysmotility, or functional d/o.  Semi solids/liquids going down eventually and now limited to this.  Of largest concern is worsening weight loss.    - start iv ppi bid, h2ra 1-2x/day, and c/w gaviscon  - Would clarify with radiology if able to do adequate barium esophagram with pregnancy and protection of fetus (unclear if able to)  - If no response to above meds, may need endoscopy +/- endoflip at same time  - from gi standpoint, there is no contraindication to TPN if indicated given weight loss  - GET RUQ u/s for increasing laes    Patient is willing to consider EGD if needed, but wishes to talk with anesthesia and .  Will f/u to answer questions that may arise

## 2023-08-12 NOTE — CONSULT NOTE ADULT - SUBJECTIVE AND OBJECTIVE BOX
ADRIÁN SHETTY  38y  Female [id number]    HPI: 39yo  @33+3 a/w weight loss and decreased po intake     Subjective  HPI: 39 y/o  33w3d presents for 2 days of complete inability to tolerate PO. +FM. -LOF. -CTXs. -VB. Pt denies any other concerns. Patient states that after getting COVID about one month and a half ago she started having difficulty swallowing. Denies choking, inability to swallow or food being stuck in her throat. She has been eating pureed food for the past few weeks but experiences throat tightness and frequent burping after swallowing. She has only been able to swallow water overnight, denies N/V. She feels hungry. States that she has seen multiple specialists including an ENT who stated that she had some mild irritation of the throat and a normal swallow study. She has had an 11 lb weight loss in the past two weeks. Denies fevers, chills. States that she feels ferny-umbilical pain and is on omeprazole.     – PNC: Denies prenatal issues. GBS unknown. EFW 1320g from .  – OBHx: MABx2 s/p D&Cx2, SABx1  – GynHx: denies  – PMH: denies  – PSH: D&Cx2  – Psych: denies   – Social: denies   – Meds: Omeprazole, Vitamins  – Allergies: NKDA  – Will accept blood transfusions? Yes    Objective  – Vital Signs  Vital Signs Last 24 Hrs  T(C): 36.9 (23 @ 23:34), Max: 36.9 (23 @ 23:34)  T(F): 98.4 (23 @ 23:34), Max: 98.4 (23 @ 23:34)  HR: 71 (23 @ 01:16) (71 - 99)  BP: 113/71 (23 @ 00:33) (113/71 - 131/91)  BP(mean): --  RR: 16 (23 @ 23:34) (16 - 16)  SpO2: 100% (23 @ 01:16) (88% - 100%)  – PE:   CV: RRR  Pulm: breathing comfortably on RA  Abd: gravid, nontender  Extr: moving all extremities with ease  – FHT: baseline 120, mod variability, +accels, -decels  – [place]: q10min  – EFW: 1320g by sono  – Sono: breech, anterior placenta (12 Aug 2023 00:17)        Name of GYN Physician:     POB:    Pgyn: Denies fibroids, cysts, endometriosis, STI's, abnormal pap smears   PMH:   PSH:  Meds:  All: NKDA  SH: Denies smoking use, drug use, alcohol use.   +occasional social alcohol use    Hospital Meds:   MEDICATIONS  (STANDING):  chlorhexidine 2% Cloths 1 Application(s) Topical daily  citric acid/sodium citrate Solution 15 milliLiter(s) Oral every 6 hours  dextrose 5% + sodium chloride 0.9%. 1000 milliLiter(s) (125 mL/Hr) IV Continuous <Continuous>  folic acid 1 milliGRAM(s) Oral daily  heparin   Injectable 5000 Unit(s) SubCutaneous every 12 hours  lactated ringers. 1000 milliLiter(s) (125 mL/Hr) IV Continuous <Continuous>  oxytocin Infusion 333.333 milliUNIT(s)/Min (1000 mL/Hr) IV Continuous <Continuous>  pantoprazole    Tablet 40 milliGRAM(s) Oral before breakfast  prenatal multivitamin 1 Tablet(s) Oral daily    MEDICATIONS  (PRN):      FAMILY HISTORY:  Family history of diabetes mellitus (DM) (Mother)    Family history of hypertension (Mother, Father)    Family history of stroke (Aunt)        Vital Signs Last 24 Hrs  T(C): 36.8 (12 Aug 2023 09:00), Max: 36.9 (11 Aug 2023 23:34)  T(F): 98.24 (12 Aug 2023 09:00), Max: 98.4 (11 Aug 2023 23:34)  HR: 66 (12 Aug 2023 11:10) (61 - 99)  BP: 116/71 (12 Aug 2023 10:33) (97/61 - 133/75)  BP(mean): --  RR: 17 (12 Aug 2023 07:00) (16 - 17)  SpO2: 100% (12 Aug 2023 11:10) (78% - 100%)    Parameters below as of 11 Aug 2023 23:34  Patient On (Oxygen Delivery Method): room air        Physical Exam:   General: sitting comfortably in bed, NAD   CV: RR S1, S2 no m/r/g  Lungs: CTA b/l, good air flow b/l   Abd: Soft, non-tender, non-distended.  Bowel sounds present.    :  No bleeding on pad.  External labia wnl.  Bimanual exam with no cervical motion tenderness, uterus wnl, adnexa non palpable b/l.  Cervix closed vs. Cervix dilated    cm   Speculum Exam: No active bleeding from os.  Physiologic discharge.    Ext: non-tender b/l, no edema     LABS:                              11.0   6.08  )-----------( 172      ( 12 Aug 2023 01:00 )             34.4     08    134<L>  |  98  |  4<L>  ----------------------------<  87  3.2<L>   |  20<L>  |  0.51    Ca    9.6      12 Aug 2023 01:00    TPro  7.7  /  Alb  4.0  /  TBili  0.6  /  DBili  x   /  AST  111<H>  /  ALT  123<H>  /  AlkPhos  171<H>  08    I&O's Detail    11 Aug 2023 07:01  -  12 Aug 2023 07:00  --------------------------------------------------------  IN:    dextrose 5% + sodium chloride 0.9%: 875 mL  Total IN: 875 mL    OUT:  Total OUT: 0 mL    Total NET: 875 mL      12 Aug 2023 07:01  -  12 Aug 2023 11:15  --------------------------------------------------------  IN:    dextrose 5% + sodium chloride 0.9%: 375 mL  Total IN: 375 mL    OUT:  Total OUT: 0 mL    Total NET: 375 mL          Urinalysis Basic - ( 12 Aug 2023 01:00 )    Color: Yellow / Appearance: Cloudy / S.012 / pH: x  Gluc: 87 mg/dL / Ketone: >=160 mg/dL  / Bili: Negative / Urobili: 1.0 mg/dL   Blood: x / Protein: Trace mg/dL / Nitrite: Negative   Leuk Esterase: Moderate / RBC: 1 /HPF / WBC 28 /HPF   Sq Epi: x / Non Sq Epi: 16 /HPF / Bacteria: Few /HPF        RADIOLOGY & ADDITIONAL STUDIES:        MORGAN Lea  PGY-2 ADRIÁN SHETTY  38y  Female [id number]    HPI: 39yo  @33+3 a/w weight loss and decreased po intake     Subjective  HPI: 39 y/o  33w3d presents for 2 days of complete inability to tolerate PO. +FM. -LOF. -CTXs. -VB. Pt denies any other concerns. Patient states that after getting COVID about one month and a half ago she started having difficulty swallowing. Denies choking, inability to swallow or food being stuck in her throat. She has been eating pureed food for the past few weeks but experiences throat tightness and frequent burping after swallowing. She has only been able to swallow water overnight, denies N/V. She feels hungry. States that she has seen multiple specialists including an ENT who stated that she had some mild irritation of the throat and a normal swallow study. She has had an 11 lb weight loss in the past two weeks. Denies fevers, chills. States that she feels ferny-umbilical pain and is on omeprazole.     Name of GYN Physician: Dr. Mcgarry    – PNC: Denies prenatal issues. GBS unknown. EFW 1320g from .  – OBHx: MABx2 s/p D&Cx2, SABx1  – GynHx: denies  – PMH: denies  – PSH: D&Cx2  – Psych: denies   – Social: denies   – Meds: Omeprazole, Vitamins  – Allergies: NKDA  – Will accept blood transfusions? Yes    Objective  – Vital Signs  Vital Signs Last 24 Hrs  T(C): 36.9 (23 @ 23:34), Max: 36.9 (23 @ 23:34)  T(F): 98.4 (23 @ 23:34), Max: 98.4 (23 @ 23:34)  HR: 71 (23 @ 01:16) (71 - 99)  BP: 113/71 (23 @ 00:33) (113/71 - 131/91)  BP(mean): --  RR: 16 (23 @ 23:34) (16 - 16)  SpO2: 100% (23 @ 01:16) (88% - 100%)    Gen: fatigued, cachectic  Pulm: breathing comfortably on RA  Abd: gravid, moderately tender in supra-umbilical region, no RUQ tenderness  Extr: moving all extremities with ease  – FHT: baseline 120, mod variability, +accels, -decels  – [place]: q10min  – EFW: 1320g (3%) normal UAD by  sono  – Sono: breech, anterior placenta (12 Aug 2023 00:17)    RADIOLOGY & ADDITIONAL STUDIES:

## 2023-08-12 NOTE — CONSULT NOTE ADULT - ASSESSMENT
37 y/o  33w3d presents with intolerance of po. She states she has had worsening dysphagia since the end of  when she was sick with covid. GI consulted for dysphagia.    #Dysphagia. New since the end of . Patient states it is to solids and liquids. Ddx includes reflux esophagitis, esophageal stricture, esophageal dysmotility  #Elevated transaminases: no known hx of liver disease. Noted to be uptrending since  in hepatocellular pattern. Denies use of OTC or herbal medications. Patient without HTN, thrombocytopenia, only trace protein in urine.     Recommendations  - barium esophagram w/ tablet - would discuss with radiology if able to be obtained safely in this patient  - trial of protonix 40 mg IV BID  - if patient has no improvement with high dose PPI, may need to consider EGD next week  - RUALEXEI US  - please send viral etiologies for transaminase elevation: HAV IgG/IgM, HBVcAb, HBVsAb (quantitative), HBVsAg, HCV IgG, HCV PCR, HEV IgM/IgG  - HSV 1/2 PCRs, EBV serologies, EBV PCR, CMV IgM/IgG, CMV PCR, Parvovirus B19 serologies, parvovirus B19 PCR  - send following for autoimmune etiologies: IMER, anti-smooth muscle antibody, anti-mitochondrial antibody, immunoglobulin panel     GI will continue to follow.     All recommendations are tentative until note is attested by attending.     [place] Tong, PGY5  Gastroenterology/Hepatology Fellow  Available on Microsoft Teams  [phone] ([employer] Short Range Pager)  [phone] (Long Range Pager)    After 5pm, please contact the on-call GI fellow. [phone]

## 2023-08-12 NOTE — OB PROVIDER H&P - NSLOWPPHRISK_OBGYN_A_OB
No previous uterine incision/Holden Pregnancy/Less than or equal to 4 previous vaginal births/No known bleeding disorder/No history of postpartum hemorrhage/No other PPH risks indicated

## 2023-08-12 NOTE — CONSULT NOTE ADULT - ASSESSMENT
37 y/o  33w3d pregnancy c/b IUGR3% UAD normal () p/w 2 days of inability to tolerate po. Ever since she contracted Covid 1.5 months ago patient has had progressive difficulty swallowing food. She reports normal appetite and denies choking, coughing, inability to swallow food or the sensation of food being stuck in her throat. She reports chest tightness and burping after eating and has been on a pureed diet for the past two weeks. Overnight patient able to drink water. She feels hungry this morning but is hesitant to try food. Patient was evaluated by ENT outpatient with a normal swallow study. She was seen by GI outpatient who wanted to hold off on evaluation until postpartum.    # Dysphagia  - f/u GI recs, ENT, nutrition, SW recs  - c/w home omeprazole    #Transaminitis/elevated lipase  - AST/ALT: 111/123  - Lipase 94  - Recommend RUQ sono    # IUGR  - ATU (), EFW 1320g (3%) normal UAD   - For ATU scan on   - Repeat growth 3-4 weeks from prior    #Fetal wellbeing  - NST BID  - BPP 2x/wk  - PNV    #Maternal wellbeing  - Reg diet  - HSQ for DVT ppx    d/w Dr. Manish Lea  PGY-3

## 2023-08-12 NOTE — OB PROVIDER H&P - NSPRENATALCARE_OBGYN_ALL_OB
"Pulmonary Function Test Results (PFT)    Spirometry Actual Predicted % Predicted   FVC (L) 2.62 3.49 75   FEV1 ((L) 2.00 3.03 66   FEV1/FVC (%) 76 85 89   FEF 25-75% (L/sec) 1.68 3.35 50   NIOX: 5ppb  Please see  PFT in \"Media Tab\" of Notes activity  (EMR)    Provider Interpretation Mild obstruction   NIOX 5 down from 23  "
Yes

## 2023-08-12 NOTE — OB PROVIDER H&P - NSTRANFUSIONOBJECTION_GEN_ALL_CORE_SIUH
Patient has no objection to blood transfusions. Mart-1 - Negative Histology Text: MART-1 staining demonstrates a normal density and pattern of melanocytes along the dermal-epidermal junction. The surgical margins are negative for tumor cells.

## 2023-08-12 NOTE — OB PROVIDER H&P - ASSESSMENT
Assessment  37yo  @33w3d admitted for inpatient management of weight loss in pregnancy with limited PO intake. Patient clinically stable without complaints.     Plan  1. Malnutrition  - CBC, CMP, UA, Amylase, Lipase ordered  - Reg diet as tolerated  - D5/NS@125  - Antiemetics PRN  - GI and Nutrition consults placed  - Component of anxiety suspected, SW consult placed    2. IUGR/Fetal Wellbeing  - ATU(): violeta breech, anterior, LIDIA 13.24, BPP 8/, 1320g(3%, AC 2%)   - Repeat ATU sono in AM  - NST BID  - f/u GBS()    3. Maternal Wellbeing  - Continue PNV, Folate  - HSQ for DVT ppx    Patient discussed with attending physician, Dr. Zeferino Cooper MD PGY4

## 2023-08-13 DIAGNOSIS — E46 UNSPECIFIED PROTEIN-CALORIE MALNUTRITION: ICD-10-CM

## 2023-08-13 LAB
ALBUMIN SERPL ELPH-MCNC: 2.8 G/DL — LOW (ref 3.3–5)
ALP SERPL-CCNC: 127 U/L — HIGH (ref 40–120)
ALT FLD-CCNC: 85 U/L — HIGH (ref 4–33)
ANION GAP SERPL CALC-SCNC: 12 MMOL/L — SIGNIFICANT CHANGE UP (ref 7–14)
AST SERPL-CCNC: 74 U/L — HIGH (ref 4–32)
BILIRUB SERPL-MCNC: 0.4 MG/DL — SIGNIFICANT CHANGE UP (ref 0.2–1.2)
BUN SERPL-MCNC: <2 MG/DL — LOW (ref 7–23)
CALCIUM SERPL-MCNC: 8.3 MG/DL — LOW (ref 8.4–10.5)
CHLORIDE SERPL-SCNC: 106 MMOL/L — SIGNIFICANT CHANGE UP (ref 98–107)
CMV IGG FLD QL: 4.1 U/ML — HIGH
CMV IGG SERPL-IMP: POSITIVE
CMV IGM FLD-ACNC: <8 AU/ML — SIGNIFICANT CHANGE UP
CMV IGM SERPL QL: NEGATIVE — SIGNIFICANT CHANGE UP
CO2 SERPL-SCNC: 21 MMOL/L — LOW (ref 22–31)
CREAT SERPL-MCNC: 0.44 MG/DL — LOW (ref 0.5–1.3)
EBV EA AB SER IA-ACNC: 6.1 U/ML — SIGNIFICANT CHANGE UP
EBV EA AB TITR SER IF: POSITIVE
EBV EA IGG SER-ACNC: NEGATIVE — SIGNIFICANT CHANGE UP
EBV NA IGG SER IA-ACNC: 206 U/ML — HIGH
EBV PATRN SPEC IB-IMP: SIGNIFICANT CHANGE UP
EBV VCA IGG AVIDITY SER QL IA: POSITIVE
EBV VCA IGM SER IA-ACNC: 107 U/ML — HIGH
EBV VCA IGM SER IA-ACNC: <10 U/ML — SIGNIFICANT CHANGE UP
EBV VCA IGM TITR FLD: NEGATIVE — SIGNIFICANT CHANGE UP
EGFR: 128 ML/MIN/1.73M2 — SIGNIFICANT CHANGE UP
GLUCOSE SERPL-MCNC: 93 MG/DL — SIGNIFICANT CHANGE UP (ref 70–99)
GROUP B BETA STREP DNA (PCR): SIGNIFICANT CHANGE UP
HAV IGG SER QL IA: REACTIVE
HAV IGM SER-ACNC: SIGNIFICANT CHANGE UP
HBV CORE IGM SER-ACNC: SIGNIFICANT CHANGE UP
HBV SURFACE AB SER-ACNC: 464.8 MIU/ML — SIGNIFICANT CHANGE UP
HBV SURFACE AG SER-ACNC: SIGNIFICANT CHANGE UP
HCT VFR BLD CALC: 27.3 % — LOW (ref 34.5–45)
HCV AB S/CO SERPL IA: 0.08 S/CO — SIGNIFICANT CHANGE UP (ref 0–0.99)
HCV AB SERPL-IMP: SIGNIFICANT CHANGE UP
HGB BLD-MCNC: 8.8 G/DL — LOW (ref 11.5–15.5)
HSV DNA1: SIGNIFICANT CHANGE UP
HSV DNA2: SIGNIFICANT CHANGE UP
HSV1 DNA BLD QL NAA+PROBE: SIGNIFICANT CHANGE UP
HSV2 DNA BLD QL NAA+PROBE: SIGNIFICANT CHANGE UP
IGA FLD-MCNC: 281 MG/DL — SIGNIFICANT CHANGE UP (ref 84–499)
IGG FLD-MCNC: 925 MG/DL — SIGNIFICANT CHANGE UP (ref 610–1660)
IGM SERPL-MCNC: 169 MG/DL — SIGNIFICANT CHANGE UP (ref 35–242)
KAPPA LC SER QL IFE: 1.86 MG/DL — SIGNIFICANT CHANGE UP (ref 0.33–1.94)
KAPPA/LAMBDA FREE LIGHT CHAIN RATIO, SERUM: 1.18 RATIO — SIGNIFICANT CHANGE UP (ref 0.26–1.65)
LAMBDA LC SER QL IFE: 1.58 MG/DL — SIGNIFICANT CHANGE UP (ref 0.57–2.63)
MAGNESIUM SERPL-MCNC: 1.6 MG/DL — SIGNIFICANT CHANGE UP (ref 1.6–2.6)
MCHC RBC-ENTMCNC: 26.8 PG — LOW (ref 27–34)
MCHC RBC-ENTMCNC: 32.2 GM/DL — SIGNIFICANT CHANGE UP (ref 32–36)
MCV RBC AUTO: 83.2 FL — SIGNIFICANT CHANGE UP (ref 80–100)
NRBC # BLD: 0 /100 WBCS — SIGNIFICANT CHANGE UP (ref 0–0)
NRBC # FLD: 0 K/UL — SIGNIFICANT CHANGE UP (ref 0–0)
PHOSPHATE SERPL-MCNC: 2.6 MG/DL — SIGNIFICANT CHANGE UP (ref 2.5–4.5)
PLATELET # BLD AUTO: 139 K/UL — LOW (ref 150–400)
POTASSIUM SERPL-MCNC: 3.2 MMOL/L — LOW (ref 3.5–5.3)
POTASSIUM SERPL-SCNC: 3.2 MMOL/L — LOW (ref 3.5–5.3)
PROT SERPL-MCNC: 5.8 G/DL — LOW (ref 6–8.3)
RBC # BLD: 3.28 M/UL — LOW (ref 3.8–5.2)
RBC # FLD: 15.2 % — HIGH (ref 10.3–14.5)
SODIUM SERPL-SCNC: 139 MMOL/L — SIGNIFICANT CHANGE UP (ref 135–145)
SOURCE GROUP B STREP: SIGNIFICANT CHANGE UP
WBC # BLD: 6.13 K/UL — SIGNIFICANT CHANGE UP (ref 3.8–10.5)
WBC # FLD AUTO: 6.13 K/UL — SIGNIFICANT CHANGE UP (ref 3.8–10.5)

## 2023-08-13 PROCEDURE — 99233 SBSQ HOSP IP/OBS HIGH 50: CPT | Mod: GC,25

## 2023-08-13 PROCEDURE — 99233 SBSQ HOSP IP/OBS HIGH 50: CPT | Mod: GC

## 2023-08-13 RX ORDER — SODIUM CHLORIDE 9 MG/ML
1000 INJECTION, SOLUTION INTRAVENOUS
Refills: 0 | Status: DISCONTINUED | OUTPATIENT
Start: 2023-08-13 | End: 2023-09-03

## 2023-08-13 RX ORDER — SODIUM CHLORIDE 9 MG/ML
1000 INJECTION, SOLUTION INTRAVENOUS
Refills: 0 | Status: DISCONTINUED | OUTPATIENT
Start: 2023-08-13 | End: 2023-08-13

## 2023-08-13 RX ORDER — DEXTROSE MONOHYDRATE, SODIUM CHLORIDE, AND POTASSIUM CHLORIDE 50; .745; 4.5 G/1000ML; G/1000ML; G/1000ML
1000 INJECTION, SOLUTION INTRAVENOUS
Refills: 0 | Status: DISCONTINUED | OUTPATIENT
Start: 2023-08-13 | End: 2023-08-24

## 2023-08-13 RX ADMIN — SODIUM CHLORIDE 125 MILLILITER(S): 9 INJECTION, SOLUTION INTRAVENOUS at 15:02

## 2023-08-13 RX ADMIN — DEXTROSE MONOHYDRATE, SODIUM CHLORIDE, AND POTASSIUM CHLORIDE 125 MILLILITER(S): 50; .745; 4.5 INJECTION, SOLUTION INTRAVENOUS at 23:35

## 2023-08-13 RX ADMIN — CHLORHEXIDINE GLUCONATE 1 APPLICATION(S): 213 SOLUTION TOPICAL at 15:04

## 2023-08-13 RX ADMIN — PANTOPRAZOLE SODIUM 40 MILLIGRAM(S): 20 TABLET, DELAYED RELEASE ORAL at 18:07

## 2023-08-13 RX ADMIN — HEPARIN SODIUM 5000 UNIT(S): 5000 INJECTION INTRAVENOUS; SUBCUTANEOUS at 12:07

## 2023-08-13 RX ADMIN — PANTOPRAZOLE SODIUM 40 MILLIGRAM(S): 20 TABLET, DELAYED RELEASE ORAL at 05:58

## 2023-08-13 NOTE — PROGRESS NOTE ADULT - SUBJECTIVE AND OBJECTIVE BOX
MFM Fellow Note  HD#3  Gestational AGE: 33+4     Patient seen and examined at bedside with Dr. Hammond this morning.   Pt reports continued difficulty swallowing. Able to tolerate two small bites of pureed food this morning, with continued cough since.     Pt seen by ENT yesterday and underwent endoscope, with no acute findings. Pt also being followed by GI, with plan for barium esophagram tomorrow. RUQ sono unremarkable. Pt currently being treated with protonix 40mg IV BID without significant improvement.     Pt has no acute obstetric complaints. Denies ctx, lof, vb. +FM.     Vital Signs Last 24 Hours  T(C): 36.6 (08-13-23 @ 09:36), Max: 36.8 (08-13-23 @ 01:18)  HR: 75 (08-13-23 @ 09:37) (60 - 83)  BP: 96/65 (08-13-23 @ 09:37) (94/70 - 140/62)  RR: 16 (08-13-23 @ 09:36) (16 - 17)  SpO2: 100% (08-13-23 @ 09:36) (99% - 100%)    Physical Exam:  General: NAD, frail appearing  Abdomen: Soft, non-tender, gravid  Ext: No pain or swelling    NST reactive  130 mod fausto +accel -decel  No ctx on toco    Most recent ultrasound - 7/25/23 -   Breech, Anterior Placenta, LIDIA 13.24, EFW 1320g (3%), AC 2%, UAD wnl      Labs:             8.8    6.13  )-----------( 139      ( 08-13 @ 04:45 )             27.3     08-13 @ 04:45    139  |  106  |  <2  ----------------------------<  93  3.2   |  21  |  0.44    Ca    8.3      08-13 @ 04:45  Phos  2.6     08-13 @ 04:45  Mg     1.60     08-13 @ 04:45    TPro  5.8  /  Alb  2.8  /  TBili  0.4  /  DBili  x   /  AST  74  /  ALT  85  /  AlkPhos  127  08-13 @ 04:45

## 2023-08-13 NOTE — PROGRESS NOTE ADULT - SUBJECTIVE AND OBJECTIVE BOX
R3 Antepartum Note, HD#3    Patient seen and examined at bedside, no acute overnight events. Reports persistent nausea without episodes of emesis. Able to tolerate soup and water last night. Reports good urine output. Pt reports +FM, denies LOF, VB, ctx, HA, epigastric pain, fevers, and chills.    Vital Signs Last 24 Hours  T(C): 36.7 (08-13-23 @ 05:24), Max: 36.8 (08-12-23 @ 09:00)  HR: 74 (08-13-23 @ 05:24) (60 - 86)  BP: 106/66 (08-13-23 @ 05:24) (94/70 - 140/62)  RR: 17 (08-13-23 @ 05:24) (17 - 17)  SpO2: 100% (08-13-23 @ 05:24) (78% - 100%)    CAPILLARY BLOOD GLUCOSE          Physical Exam:  General: NAD  Abdomen: Soft, non-tender, gravid  Ext: No pain or swelling    NST reactive overnight    Labs:             8.8    6.13  )-----------( 139      ( 08-13 @ 04:45 )             27.3     08-13 @ 04:45    139  |  106  |  <2  ----------------------------<  93  3.2   |  21  |  0.44    Ca    8.3      08-13 @ 04:45  Phos  2.6     08-13 @ 04:45  Mg     1.60     08-13 @ 04:45    TPro  5.8  /  Alb  2.8  /  TBili  0.4  /  DBili  x   /  AST  74  /  ALT  85  /  AlkPhos  127  08-13 @ 04:45            MEDICATIONS  (STANDING):  chlorhexidine 2% Cloths 1 Application(s) Topical daily  citric acid/sodium citrate Solution 15 milliLiter(s) Oral every 6 hours  dextrose 5% + sodium chloride 0.9%. 1000 milliLiter(s) (125 mL/Hr) IV Continuous <Continuous>  folic acid 1 milliGRAM(s) Oral daily  heparin   Injectable 5000 Unit(s) SubCutaneous every 12 hours  lactated ringers. 1000 milliLiter(s) (125 mL/Hr) IV Continuous <Continuous>  oxytocin Infusion 333.333 milliUNIT(s)/Min (1000 mL/Hr) IV Continuous <Continuous>  pantoprazole  Injectable 40 milliGRAM(s) IV Push every 12 hours  prenatal multivitamin 1 Tablet(s) Oral daily    MEDICATIONS  (PRN):

## 2023-08-13 NOTE — DIETITIAN INITIAL EVALUATION ADULT - OTHER INFO
Nutrition consult for MST Score 2 or Greater.    39 y/o  33w4d pregnancy c/b IUGR3% UAD normal () p/w 2 days of inability to tolerate po. Ever since she contracted Covid 1.5 months ago patient has had progressive difficulty swallowing food. Pt reports a pureed diet for the past two weeks. Overnight patient able to drink water and tolerate soft foods. (OB ).    Patient reported poor tolerance to puree food items, as well as liquids. Patient was coughing at time of visit following "a few teaspoons" of breakfast meal. She stated she feels that food is getting stuck and that she has to get up and walk after each bite.  RD spoke with RN and physician and team regarding RD encounter.  Discussed that there may be some concern for aspiration based on the fact that patient is reporting poor tolerance to pureed food items and liquids. Patient is pending EGD tomorrow. Discussed alternate feeding methods i.e. NGT (EN) and PN.  Per team, not a candidate for EN until further GI w/u (scheduled for tomorrow).  If patient unable to tolerate PO and if not a candidate for EN, discussed with team that patient may need alternate means of nutrition via PN and encouraged consulting with nutrition support team.    Patient noted with Pre-Pregnancy weight of 57kg.  Current weight 54kg. Patient reported 11 pound / 5 kg weight loss in 2 months (Pt ~130 pounds / 59kg 2 months ago). Based on reported weight hx, this is reflective of a 5kg wt loss / 8.5% since 2023; patient attributes weight loss to dysphagia and GERD symptoms, inability to tolerate PO intake following Covid infection.  Patient endorses nausea, denies vomiting / diarrhea. Nutrition consult for MST Score 2 or Greater.    37 y/o  33w4d pregnancy c/b IUGR3% UAD normal () p/w 2 days of inability to tolerate po. Ever since she contracted Covid 1.5 months ago patient has had progressive difficulty swallowing food. Pt reports a pureed diet for the past two weeks. Overnight patient able to drink water and tolerate soft foods. (OB ).    Patient reported poor tolerance to puree food items, as well as liquids. Patient was coughing at time of visit following "a few teaspoons" of breakfast meal. She stated she feels that food is getting stuck and that she has to get up and walk after each bite.  RD spoke with RN and physician and team regarding RD encounter.  Discussed that there may be some concern for risk of aspiration based on the fact that patient is reporting poor tolerance to pureed food items and liquids. Patient is pending EGD tomorrow. Discussed alternate feeding methods i.e. NGT (EN) and PN.  Per team, not a candidate for EN until further GI w/u (scheduled for tomorrow).  If patient unable to tolerate PO and if not a candidate for EN, discussed with team that patient may need alternate means of nutrition via PN and encouraged consulting with nutrition support team.    Patient noted with Pre-Pregnancy weight of 57kg.  Current weight 54kg. Patient reported 11 pound / 5 kg weight loss in 2 months (Pt ~130 pounds / 59kg 2 months ago). Based on reported weight hx, this is reflective of a 5kg wt loss / 8.5% since 2023; patient attributes weight loss to dysphagia and GERD symptoms, inability to tolerate PO intake following Covid infection.  Patient endorses nausea, denies vomiting / diarrhea. Nutrition consult for MST Score 2 or Greater.    39 y/o  33w4d pregnancy c/b IUGR3% UAD normal () p/w 2 days of inability to tolerate po. Ever since she contracted Covid 1.5 months ago patient has had progressive difficulty swallowing food. Pt reports a pureed diet for the past two weeks. Overnight patient able to drink water and tolerate soft foods. (OB ).    Patient reported poor tolerance to puree food items, as well as liquids. Patient was coughing at time of visit following "a few teaspoons" of breakfast meal. She stated she feels that food is getting stuck and that she has to get up and walk after each bite.  RD spoke with RN and physician and team regarding RD encounter.  Discussed that there may be some concern for risk of aspiration based on the fact that patient is reporting poor tolerance to pureed food items and liquids. Patient is pending further w/u tomorrow. Discussed alternate feeding methods i.e. NGT (EN) and PN.  Per team, not a candidate for EN until further GI w/u (scheduled for tomorrow).  If patient unable to tolerate PO and if not a candidate for EN, discussed with team that patient may need alternate means of nutrition via PN and encouraged consulting with nutrition support team.    Patient noted with Pre-Pregnancy weight of 57kg.  Current weight 54kg. Patient reported 11 pound / 5 kg weight loss in 2 months (Pt ~130 pounds / 59kg 2 months ago). Based on reported weight hx, this is reflective of a 5kg wt loss / 8.5% since 2023; patient attributes weight loss to dysphagia and GERD symptoms, inability to tolerate PO intake following Covid infection.  Patient endorses nausea, denies vomiting / diarrhea.

## 2023-08-13 NOTE — DIETITIAN INITIAL EVALUATION ADULT - COLLABORATION WITH OTHER PROVIDERS
physician, RN; consider consulting with Nutrition Support Team for possible initiation of PN if patient unable to tolerate diet and/or unable to receive EN

## 2023-08-13 NOTE — PROGRESS NOTE ADULT - ASSESSMENT
37yo  at 33w4d with pregnancy significant for severe IUGR (EFW 3%, AC 2%, UAD wnl) admitted with progressive worsening dysphagia resulting in limited PO intake. Work up thus far noncontributory ENT endoscope without significant findings, and pt awaiting barium esophagram and possible endoscopy with GI. Pt with noted mild transaminitis as well, RUQ sonogram unremarkable and hepatic panel without any acute findings. Appreciate GI consult.     Given continued coughing and worsening dysphagia, recommend transition to NPO while awaiting complete GI work up. Will continue to hydrate patient with IVF (consider D5/NS with K supplementation).     Fetal status reactive and reassuring. Continue NST BID.   Known IUGR fetus, will plan for repeat growth scan this week.     MFM to continue to follow    Pt seen with ASHWIN Gloria Attending  Jovany PGY5, MFM Fellow

## 2023-08-13 NOTE — DIETITIAN INITIAL EVALUATION ADULT - NSFNSNUTRCHEWSWALLOWFT_GEN_A_CORE
Patient reports swallowing difficulty, GI involved, on Pureed diet, team considering NPO pending further w/u scheduled for tomorrow 8/14.

## 2023-08-13 NOTE — DIETITIAN INITIAL EVALUATION ADULT - DIET TYPE
Consider alternate means of nutrition / hydration; defer diet/liquid provision and/or possible initiation of NPO status to physician while pending further w/u

## 2023-08-13 NOTE — DIETITIAN INITIAL EVALUATION ADULT - PERTINENT MEDS FT
MEDICATIONS  (STANDING):  chlorhexidine 2% Cloths 1 Application(s) Topical daily  citric acid/sodium citrate Solution 15 milliLiter(s) Oral every 6 hours  dextrose 5% + sodium chloride 0.9%. 1000 milliLiter(s) (125 mL/Hr) IV Continuous <Continuous>  folic acid 1 milliGRAM(s) Oral daily  heparin   Injectable 5000 Unit(s) SubCutaneous every 12 hours  lactated ringers. 1000 milliLiter(s) (125 mL/Hr) IV Continuous <Continuous>  oxytocin Infusion 333.333 milliUNIT(s)/Min (1000 mL/Hr) IV Continuous <Continuous>  pantoprazole  Injectable 40 milliGRAM(s) IV Push every 12 hours  prenatal multivitamin 1 Tablet(s) Oral daily    MEDICATIONS  (PRN):

## 2023-08-13 NOTE — PROGRESS NOTE ADULT - ATTENDING COMMENTS
Pt seen and evaluated at bedside  Agree with above  P0 at 33w4d admitted with difficult swallowing, progressively worsening  Seen by GI, ENT, nutrition, appreciate input  Will make NPO per nutrition  Esophogram per GI, continue protonix  Fetus IUGR, fetal status reassuring     peggy TANNER

## 2023-08-13 NOTE — DIETITIAN INITIAL EVALUATION ADULT - OTHER CALCULATIONS
Kcal in 3rd trimester of pregnancy = 2447kcals (1995kcal (35kcal/kg/57kg pre-pregnancy weight + 452kcals for 3rd trimester)

## 2023-08-13 NOTE — PROGRESS NOTE ADULT - ATTENDING COMMENTS
Patient without pertinent medical or surgical history who present with monthlong dysphagia s/p COVID infection.  Report there has been little improvement and so far the workup has been non contributory (ENT and outpatient GI consults).  Over the past 2 weeks the patient lost 11 lbs and while was able to tolerate pureed foods before can no longer tolerate that.    F/up GI recs for barium study and endoscopy.    Daily labs to trend electrolytes.    Twice daily NST while inpatient.  Follow up growth scan next week.

## 2023-08-13 NOTE — DIETITIAN INITIAL EVALUATION ADULT - ORAL NUTRITION SUPPLEMENTS
Discussed adding oral supplements, however, per discussion with team, not appropriate given reports of negligible PO intake / poor PO diet & liquid tolerance.

## 2023-08-13 NOTE — DIETITIAN INITIAL EVALUATION ADULT - PERTINENT LABORATORY DATA
08-13    139  |  106  |  <2<L>  ----------------------------<  93  3.2<L>   |  21<L>  |  0.44<L>    Ca    8.3<L>      13 Aug 2023 04:45  Phos  2.6     08-13  Mg     1.60     08-13    TPro  5.8<L>  /  Alb  2.8<L>  /  TBili  0.4  /  DBili  x   /  AST  74<H>  /  ALT  85<H>  /  AlkPhos  127<H>  08-13

## 2023-08-13 NOTE — DIETITIAN INITIAL EVALUATION ADULT - SIGNS/SYMPTOMS
Weight loss 8.5% in < 3 months, predicted caloric intake <50% nutrition needs >/=5 days. Patient in 3rd trimester of pregnancy.

## 2023-08-13 NOTE — PROGRESS NOTE ADULT - ASSESSMENT
39 y/o  33w4d pregnancy c/b IUGR3% UAD normal () p/w 2 days of inability to tolerate po. Ever since she contracted Covid 1.5 months ago patient has had progressive difficulty swallowing food. Pt reports a pureed diet for the past two weeks. Overnight patient able to drink water and tolerate soft foods.     # Dysphagia  - ENT following, preformed flexibles endoscope. No acute intervention. F/U outpatient   - GI following, appreciate recs. Protonix 40mg IV, if no improvement with consider a EGD barium esophagram. Recommend RUQ sono   - F/U lab evaluation for viral and autoimmune etiologies  - SW recs  - Daily weights.   - Nutrition consult    #Transaminitis/elevated lipase  - AST/ALT: 111/123  - Lipase 94  - Recommend RUQ sono  - F/U AM CBC/ CMP     # IUGR  - ATU (), EFW 1320g (3%) normal UAD   - For ATU scan on   - Repeat growth 3-4 weeks from prior    #Fetal wellbeing  - NST BID  - BPP 2x/wk  - PNV    #Maternal wellbeing  - Reg diet  - HSQ for DVT ppx    COOPER Ford, pgy-3

## 2023-08-14 ENCOUNTER — APPOINTMENT (OUTPATIENT)
Dept: OBGYN | Facility: CLINIC | Age: 38
End: 2023-08-14

## 2023-08-14 ENCOUNTER — APPOINTMENT (OUTPATIENT)
Dept: ANTEPARTUM | Facility: CLINIC | Age: 38
End: 2023-08-14

## 2023-08-14 LAB
APTT BLD: 29.9 SEC — SIGNIFICANT CHANGE UP (ref 24.5–35.6)
EBV DNA SERPL NAA+PROBE-ACNC: SIGNIFICANT CHANGE UP IU/ML
EBVPCR LOG: SIGNIFICANT CHANGE UP LOG10IU/ML
HCV RNA SPEC NAA+PROBE-LOG IU: SIGNIFICANT CHANGE UP
HCV RNA SPEC NAA+PROBE-LOG IU: SIGNIFICANT CHANGE UP LOGIU/ML
INR BLD: 1.03 RATIO — SIGNIFICANT CHANGE UP (ref 0.85–1.18)
PROTHROM AB SERPL-ACNC: 11.5 SEC — SIGNIFICANT CHANGE UP (ref 9.5–13)

## 2023-08-14 PROCEDURE — 99232 SBSQ HOSP IP/OBS MODERATE 35: CPT | Mod: GC

## 2023-08-14 PROCEDURE — 99222 1ST HOSP IP/OBS MODERATE 55: CPT

## 2023-08-14 PROCEDURE — 74221 X-RAY XM ESOPHAGUS 2CNTRST: CPT | Mod: 26

## 2023-08-14 RX ADMIN — SODIUM CHLORIDE 125 MILLILITER(S): 9 INJECTION, SOLUTION INTRAVENOUS at 12:02

## 2023-08-14 RX ADMIN — PANTOPRAZOLE SODIUM 40 MILLIGRAM(S): 20 TABLET, DELAYED RELEASE ORAL at 19:04

## 2023-08-14 RX ADMIN — HEPARIN SODIUM 5000 UNIT(S): 5000 INJECTION INTRAVENOUS; SUBCUTANEOUS at 12:02

## 2023-08-14 RX ADMIN — PANTOPRAZOLE SODIUM 40 MILLIGRAM(S): 20 TABLET, DELAYED RELEASE ORAL at 06:12

## 2023-08-14 RX ADMIN — DEXTROSE MONOHYDRATE, SODIUM CHLORIDE, AND POTASSIUM CHLORIDE 125 MILLILITER(S): 50; .745; 4.5 INJECTION, SOLUTION INTRAVENOUS at 06:15

## 2023-08-14 RX ADMIN — HEPARIN SODIUM 5000 UNIT(S): 5000 INJECTION INTRAVENOUS; SUBCUTANEOUS at 23:14

## 2023-08-14 NOTE — CONSULT NOTE ADULT - NS ATTEND AMEND GEN_ALL_CORE FT
I agree with the above history, physical examination, chief complaint/diagnosis, and plan, which I have reviewed and edited where appropriate.  I agree with notes/assessment and detailed interval history of health care providers on my service.  I have seen and examined the patient.  I reviewed the laboratory and available data and agree with the history, physical assessment and plan.  I reviewed and discussed with all consultants, house staff and PA's.  The Nutrition Support Team (NST) discusses on an ongoing basis with the primary team and all consultants, House staff and PA's to have a permanent risk benefit analyses on all decisions and coordinating care.  I was physically present for the key portions of the evaluation and management (E/M) service provided.  39 y/o  33w5d pregnancy with inability to tolerate PO. Patient has had progressive difficulty swallowing. Nutrition support consult called for evaluation for parenteral nutrition. Patient meets criteria for severe protein calorie malnutrition based on weight loss 8.5% in < 3 months, predicted caloric intake <50% nutrition needs >/=5 days.  PHYSICAL EXAM:  Constitutional: no acute distress  Heart RR  Lung clear  Gastrointestinal: abdomen nontender, +gravid  Extremities: warm  Metabolic Requirements:  The patient will require:  25 kilocalories / kg / day  Diagnosis:  Severe protein calorie malnutrition in acute illness/ injury  Plan:  Will follow esophagram

## 2023-08-14 NOTE — PROGRESS NOTE ADULT - SUBJECTIVE AND OBJECTIVE BOX
R3 Antepartum Note, HD#4    Patient seen and examined at bedside. Pt made NPO with concern for aspiration risk, however pt continues to reports drinking water overnight 2/2 to dry mouth. Pt reports that it feels stuck in the middle of her throat with a burning sensation in her midsternum. Reports coughing with fluid intake. Denies emesis. Pt reports +FM, denies LOF, VB, ctx, HA, epigastric pain, blurred vision, CP, SOB, N/V, fevers, and chills.    Vital Signs Last 24 Hours  T(C): 36.8 (08-14-23 @ 05:17), Max: 37.2 (08-13-23 @ 18:02)  HR: 75 (08-14-23 @ 05:17) (65 - 87)  BP: 107/57 (08-14-23 @ 05:17) (100/66 - 108/67)  RR: 16 (08-14-23 @ 05:17) (16 - 17)  SpO2: 96% (08-14-23 @ 05:17) (96% - 100%)    CAPILLARY BLOOD GLUCOSE    Physical Exam:  General: NAD  Abdomen: Soft, non-tender, gravid  Ext: No pain or swelling    NST reactive overnight    Labs:             8.8    6.13  )-----------( 139      ( 08-13 @ 04:45 )             27.3     08-13 @ 04:45    139  |  106  |  <2  ----------------------------<  93  3.2   |  21  |  0.44    Ca    8.3      08-13 @ 04:45  Phos  2.6     08-13 @ 04:45  Mg     1.60     08-13 @ 04:45    TPro  5.8  /  Alb  2.8  /  TBili  0.4  /  DBili  x   /  AST  74  /  ALT  85  /  AlkPhos  127  08-13 @ 04:45    PT/INR - ( 08-14 @ 06:21 )   PT: 11.5 sec;   INR: 1.03 ratio    PTT - ( 08-14 @ 06:21 )  PTT:29.9 sec        MEDICATIONS  (STANDING):  dextrose 5% + lactated ringers with potassium chloride 20 mEq/L 1000 milliLiter(s) (125 mL/Hr) IV Continuous <Continuous>  folic acid 1 milliGRAM(s) Oral daily  heparin   Injectable 5000 Unit(s) SubCutaneous every 12 hours  pantoprazole  Injectable 40 milliGRAM(s) IV Push every 12 hours  prenatal multivitamin 1 Tablet(s) Oral daily  sodium chloride 0.9% 1000 milliLiter(s) (125 mL/Hr) IV Continuous <Continuous>    MEDICATIONS  (PRN):

## 2023-08-14 NOTE — PROGRESS NOTE ADULT - SUBJECTIVE AND OBJECTIVE BOX
Gastroenterology/Hepatology Progress Note    Interval Events: Patient continues to have intolerance of solids and liquids. Pending xray esophagram. No improvement w/ high dose ppi.    Allergies:  No Known Drug Allergies  shellfish (Hives)    Hospital Medications:  dextrose 5% + lactated ringers with potassium chloride 20 mEq/L 1000 milliLiter(s) IV Continuous <Continuous>  folic acid 1 milliGRAM(s) Oral daily  heparin   Injectable 5000 Unit(s) SubCutaneous every 12 hours  pantoprazole  Injectable 40 milliGRAM(s) IV Push every 12 hours  prenatal multivitamin 1 Tablet(s) Oral daily  sodium chloride 0.9% 1000 milliLiter(s) IV Continuous <Continuous>    ROS: 14 point ROS negative unless otherwise state in subjective    PHYSICAL EXAM:   Vital Signs:  Vital Signs Last 24 Hrs  T(C): 36.8 (14 Aug 2023 05:17), Max: 37.2 (13 Aug 2023 18:02)  T(F): 98.3 (14 Aug 2023 05:17), Max: 98.9 (13 Aug 2023 18:02)  HR: 75 (14 Aug 2023 05:17) (65 - 87)  BP: 107/57 (14 Aug 2023 05:17) (100/66 - 108/67)  BP(mean): --  RR: 16 (14 Aug 2023 05:17) (16 - 17)  SpO2: 96% (14 Aug 2023 05:17) (96% - 100%)    Parameters below as of 14 Aug 2023 05:17  Patient On (Oxygen Delivery Method): room air    Daily     Daily     GENERAL:  No acute distress  HEENT:  NCAT, no scleral icterus  CHEST: no resp distress  HEART:  RRR  ABDOMEN:  Soft, non-tender, non-distended   EXTREMITIES:  No cyanosis, clubbing, or edema  SKIN:  No rash/erythema/ecchymoses   NEURO:  Alert and oriented x 3     LABS:                        8.8    6.13  )-----------( 139      ( 13 Aug 2023 04:45 )             27.3       08-13    139  |  106  |  <2<L>  ----------------------------<  93  3.2<L>   |  21<L>  |  0.44<L>    Ca    8.3<L>      13 Aug 2023 04:45  Phos  2.6     08-13  Mg     1.60     08-13    TPro  5.8<L>  /  Alb  2.8<L>  /  TBili  0.4  /  DBili  x   /  AST  74<H>  /  ALT  85<H>  /  AlkPhos  127<H>  08-13    LIVER FUNCTIONS - ( 13 Aug 2023 04:45 )  Alb: 2.8 g/dL / Pro: 5.8 g/dL / ALK PHOS: 127 U/L / ALT: 85 U/L / AST: 74 U/L / GGT: x           PT/INR - ( 14 Aug 2023 06:21 )   PT: 11.5 sec;   INR: 1.03 ratio         PTT - ( 14 Aug 2023 06:21 )  PTT:29.9 sec  Urinalysis Basic - ( 13 Aug 2023 04:45 )    Color: x / Appearance: x / SG: x / pH: x  Gluc: 93 mg/dL / Ketone: x  / Bili: x / Urobili: x   Blood: x / Protein: x / Nitrite: x   Leuk Esterase: x / RBC: x / WBC x   Sq Epi: x / Non Sq Epi: x / Bacteria: x            Imaging:           Gastroenterology/Hepatology Progress Note    Interval Events: Patient continues to have intolerance of solids and liquids. Pending xray esophagram. No improvement w/ high dose ppi.    Allergies:  No Known Drug Allergies  shellfish (Hives)    Hospital Medications:  dextrose 5% + lactated ringers with potassium chloride 20 mEq/L 1000 milliLiter(s) IV Continuous <Continuous>  folic acid 1 milliGRAM(s) Oral daily  heparin   Injectable 5000 Unit(s) SubCutaneous every 12 hours  pantoprazole  Injectable 40 milliGRAM(s) IV Push every 12 hours  prenatal multivitamin 1 Tablet(s) Oral daily  sodium chloride 0.9% 1000 milliLiter(s) IV Continuous <Continuous>    ROS: 14 point ROS negative unless otherwise state in subjective    PHYSICAL EXAM:   Vital Signs:  Vital Signs Last 24 Hrs  T(C): 36.8 (14 Aug 2023 05:17), Max: 37.2 (13 Aug 2023 18:02)  T(F): 98.3 (14 Aug 2023 05:17), Max: 98.9 (13 Aug 2023 18:02)  HR: 75 (14 Aug 2023 05:17) (65 - 87)  BP: 107/57 (14 Aug 2023 05:17) (100/66 - 108/67)  BP(mean): --  RR: 16 (14 Aug 2023 05:17) (16 - 17)  SpO2: 96% (14 Aug 2023 05:17) (96% - 100%)    Parameters below as of 14 Aug 2023 05:17  Patient On (Oxygen Delivery Method): room air    Daily     Daily     GENERAL:  No acute distress  HEENT:  NCAT, no scleral icterus  CHEST: no resp distress  HEART:  RRR  ABDOMEN:  Soft, non-tender, non-distended   EXTREMITIES:  No cyanosis, clubbing, or edema  SKIN:  No rash/erythema/ecchymoses   NEURO:  Alert and oriented x 3     LABS:                        8.8    6.13  )-----------( 139      ( 13 Aug 2023 04:45 )             27.3       08-13    139  |  106  |  <2<L>  ----------------------------<  93  3.2<L>   |  21<L>  |  0.44<L>    Ca    8.3<L>      13 Aug 2023 04:45  Phos  2.6     08-13  Mg     1.60     08-13    TPro  5.8<L>  /  Alb  2.8<L>  /  TBili  0.4  /  DBili  x   /  AST  74<H>  /  ALT  85<H>  /  AlkPhos  127<H>  08-13    LIVER FUNCTIONS - ( 13 Aug 2023 04:45 )  Alb: 2.8 g/dL / Pro: 5.8 g/dL / ALK PHOS: 127 U/L / ALT: 85 U/L / AST: 74 U/L / GGT: x           PT/INR - ( 14 Aug 2023 06:21 )   PT: 11.5 sec;   INR: 1.03 ratio         PTT - ( 14 Aug 2023 06:21 )  PTT:29.9 sec  Urinalysis Basic - ( 13 Aug 2023 04:45 )    Color: x / Appearance: x / SG: x / pH: x  Gluc: 93 mg/dL / Ketone: x  / Bili: x / Urobili: x   Blood: x / Protein: x / Nitrite: x   Leuk Esterase: x / RBC: x / WBC x   Sq Epi: x / Non Sq Epi: x / Bacteria: x

## 2023-08-14 NOTE — CONSULT NOTE ADULT - SUBJECTIVE AND OBJECTIVE BOX
Nutrition Support Consult Note    HPI:   39 y/o  33w5d pregnancy c/b IUGR3% UAD normal () p/w 2 days of inability to tolerate po. Patient has had progressive difficulty swallowing food since covid 1.5 months ago.. Pt reports a pureed diet for the past two weeks. Nutrition support consult called for evaluation for parenteral nutrition. Patient meets criteria for severe protein calorie malnutrition based on weight loss 8.5% in < 3 months, predicted caloric intake <50% nutrition needs >/=5 days. Patient is currently undergoing GI workup. Pt denies chest pain, shortness of breath, nausea or vomiting at this time.    ROS: Except as noted above, all other systems reviewed and are negative     Allergies  No Known Drug Allergies  shellfish (Hives)    PAST MEDICAL & SURGICAL HISTORY:  GERD (gastroesophageal reflux disease)  History of dilation and curettage    FAMILY HISTORY:  Family history of diabetes mellitus (DM) (Mother)  Family history of hypertension (Mother, Father)  Family history of stroke (Aunt)    Vital Signs Last 24 Hrs  T(C): 36.6 (14 Aug 2023 11:13), Max: 37.2 (13 Aug 2023 18:02)  T(F): 97.9 (14 Aug 2023 11:13), Max: 98.9 (13 Aug 2023 18:02)  HR: 82 (14 Aug 2023 11:13) (65 - 87)  BP: 110/66 (14 Aug 2023 11:13) (100/66 - 110/66)  RR: 16 (14 Aug 2023 11:13) (16 - 17)  SpO2: 100% (14 Aug 2023 11:13) (96% - 100%)    MEDICATIONS  (STANDING):  dextrose 5% + lactated ringers with potassium chloride 20 mEq/L 1000 milliLiter(s) (125 mL/Hr) IV Continuous <Continuous>  folic acid 1 milliGRAM(s) Oral daily  heparin   Injectable 5000 Unit(s) SubCutaneous every 12 hours  pantoprazole  Injectable 40 milliGRAM(s) IV Push every 12 hours  prenatal multivitamin 1 Tablet(s) Oral daily  sodium chloride 0.9% 1000 milliLiter(s) (125 mL/Hr) IV Continuous <Continuous>    PHYSICAL EXAM:  Constitutional: no acute distress, sitting comfortably in bed   Gastrointestinal: abdomen nontender, +gravid  Extremities: warm to touch, no pain or swelling     LABS:                        8.8    6.13  )-----------( 139      ( 13 Aug 2023 04:45 )             27.3       139  |  106  |  <2<L>  ----------------------------<  93  3.2<L>   |  21<L>  |  0.44<L>    Ca    8.3<L>      13 Aug 2023 04:45  Phos  2.6       Mg     1.60         TPro  5.8<L>  /  Alb  2.8<L>  /  TBili  0.4  /  DBili  x   /  AST  74<H>  /  ALT  85<H>  /  AlkPhos  127<H>      LIVER FUNCTIONS - ( 13 Aug 2023 04:45 )  Alb: 2.8 g/dL / Pro: 5.8 g/dL / ALK PHOS: 127 U/L / ALT: 85 U/L / AST: 74 U/L / GGT: x           PT/INR - ( 14 Aug 2023 06:21 )   PT: 11.5 sec;   INR: 1.03 ratio       PTT - ( 14 Aug 2023 06:21 )  PTT:29.9 sec    CLINICAL INDICATORS  Severe protein calorie malnutrition in acute illness/ injury; secondary to poor appetite, weight loss >5% in 1 month and/or >7.5% in 3 months, caloric Intake <50% of nutrition needs >= 5 days, temporal wasting, severe loss of muscle mass/atrophy and loss of body fat stores.    Metabolic Requirements:  The patient will require:  _______25______ kilocalories / kg / day  Diagnosis:  [  ] Mild protein malnutrition  [  ] Moderate protein calorie malnutrition in acute illness/ injury  [ x ] Severe protein calorie malnutrition in acute illness/ injury  [  ] Moderate protein calorie malnutrition in chronic illness  [  ] Severe protein calorie malnutrition in chronic illness    Plan:    will follow up on esophagram today and GI recommendations    will continue to follow re indication for parenteral nutrition    plan discussed with pt at bedside    Nutrition Support [id number]

## 2023-08-14 NOTE — PROGRESS NOTE ADULT - ATTENDING COMMENTS
# Dysphagia  # Poor PO intake  # Chest, throat and head "tightness" with reports of subjective difficulty with inspiration: Etiology unclear. Suspect possible anxiety component (as noted by partner/family at bedside).   # Elevated liver tests    --Follow up pending barium esophagram results; by my read, no evidence of complete obstruction and barium tablet passes into stomach  --Trial liquids per request of patient  --Continue with high dose PPI trial  --Liver serology workup as above  --Trend liver tests, if rising and/or associated with worsening thrombocytopenia, suggest hepatology consultation  --Discussed potential EGD/EndoFLIP pending final results of esophagram, but patient currently declining    Additional recommendations as above. # Dysphagia, globus sensation: Suspect largely functional component, but possibly underlying stricture vs dysmotility  # Poor PO intake  # Chest, throat and head "tightness" with reports of subjective difficulty with inspiration: Etiology unclear. Suspect possible anxiety component (as noted by partner/family at bedside).   # Elevated liver tests    --Follow up pending barium esophagram results; by my read, no evidence of complete obstruction and barium tablet passes into stomach  --Trial liquids per request of patient  --Continue with high dose PPI trial  --Liver serology workup as above  --Trend liver tests, if rising and/or associated with worsening thrombocytopenia, suggest hepatology consultation  --Discussed potential EGD/EndoFLIP pending final results of esophagram, but patient currently declining    Additional recommendations as above.

## 2023-08-14 NOTE — PROGRESS NOTE ADULT - ASSESSMENT
39 y/o  33w5d pregnancy c/b IUGR3% UAD normal () p/w 2 days of inability to tolerate po. Ever since she contracted Covid 1.5 months ago patient has had progressive difficulty swallowing food. Pt reports a pureed diet for the past two weeks. Overnight patient able to drink water and tolerate soft foods.     # Dysphagia  - ENT following, preformed flexibles endoscope. No acute intervention. F/U outpatient   - GI following, appreciate recs. Protonix 40mg IV, if no improvement with consider a EGD barium esophagram. Recommend RUQ sono   - F/U lab evaluation for viral and autoimmune etiologies  - SW recs  - Daily weights.   - Nutrition consult    #Transaminitis/elevated lipase  - AST/ALT: 111/123  - Lipase 94  - Recommend RUQ sono  - F/U AM CBC/ CMP     # IUGR  - ATU (), EFW 1320g (3%) normal UAD   - For ATU scan on   - Repeat growth 3-4 weeks from prior    #Fetal wellbeing  - NST BID  - BPP 2x/wk  - PNV    #Maternal wellbeing  - Reg diet  - HSQ for DVT ppx    COOPER Ford, pgy-3    37 y/o  33w5d pregnancy c/b IUGR3% UAD normal () p/w 2 days of inability to tolerate po. Ever since she contracted Covid 1.5 months ago patient has had progressive difficulty swallowing food. Pt reports a pureed diet for the past two weeks. Pt admitted for dysphagia workup, currently NPO for aspiration risk. Pt without improvement IV Protonix for XRay barium esophagram today.     # Dysphagia  - ENT following, preformed flexibles endoscope. No acute intervention. F/U outpatient   - GI following, appreciate recs. Protonix 40mg IV, if no improvement with consider a EGD barium esophagram. To be preformed today. Will follow up the result  - F/U lab evaluation for viral and autoimmune etiologies. No cause identified today.   - SW recs  - Daily weights.   - Nutrition consult  - TPN consult placed. Seen . Will consider pending GI recommendations and results of the barium esophogram.     #Transaminitis/elevated lipase  - AST/ALT: 111/123 -> 74/85   - Lipase 94  - RUQ sono (): WNL.      # IUGR  - ATU (), EFW 1320g (3%) normal UAD   - For ATU scan on   - Repeat growth 3-4 weeks from prior    #Fetal wellbeing  - NST BID  - BPP 2x/wk  - PNV    #Maternal wellbeing  - NPO diet, pt continues to drink sips of water. Discussed with patient again today about her risk for aspiration and our recommendations for NPO.   - HSQ for DVT ppx    COOPER Ford, pgy-3

## 2023-08-15 ENCOUNTER — ASOB RESULT (OUTPATIENT)
Age: 38
End: 2023-08-15

## 2023-08-15 ENCOUNTER — APPOINTMENT (OUTPATIENT)
Dept: ANTEPARTUM | Facility: CLINIC | Age: 38
End: 2023-08-15
Payer: COMMERCIAL

## 2023-08-15 ENCOUNTER — APPOINTMENT (OUTPATIENT)
Dept: ANTEPARTUM | Facility: CLINIC | Age: 38
End: 2023-08-15

## 2023-08-15 ENCOUNTER — APPOINTMENT (OUTPATIENT)
Dept: OBGYN | Facility: CLINIC | Age: 38
End: 2023-08-15

## 2023-08-15 LAB
ALBUMIN SERPL ELPH-MCNC: 3.1 G/DL — LOW (ref 3.3–5)
ALBUMIN SERPL ELPH-MCNC: 3.7 G/DL — SIGNIFICANT CHANGE UP (ref 3.3–5)
ALP SERPL-CCNC: 137 U/L — HIGH (ref 40–120)
ALP SERPL-CCNC: 161 U/L — HIGH (ref 40–120)
ALT FLD-CCNC: 78 U/L — HIGH (ref 4–33)
ALT FLD-CCNC: 87 U/L — HIGH (ref 4–33)
ANION GAP SERPL CALC-SCNC: 10 MMOL/L — SIGNIFICANT CHANGE UP (ref 7–14)
ANION GAP SERPL CALC-SCNC: 11 MMOL/L — SIGNIFICANT CHANGE UP (ref 7–14)
AST SERPL-CCNC: 64 U/L — HIGH (ref 4–32)
AST SERPL-CCNC: 72 U/L — HIGH (ref 4–32)
B19V DNA FLD QL NAA+PROBE: SIGNIFICANT CHANGE UP IU/ML
BASOPHILS # BLD AUTO: 0.04 K/UL — SIGNIFICANT CHANGE UP (ref 0–0.2)
BASOPHILS NFR BLD AUTO: 0.8 % — SIGNIFICANT CHANGE UP (ref 0–2)
BILIRUB SERPL-MCNC: 0.5 MG/DL — SIGNIFICANT CHANGE UP (ref 0.2–1.2)
BILIRUB SERPL-MCNC: 0.6 MG/DL — SIGNIFICANT CHANGE UP (ref 0.2–1.2)
BUN SERPL-MCNC: <2 MG/DL — LOW (ref 7–23)
BUN SERPL-MCNC: <2 MG/DL — LOW (ref 7–23)
CALCIUM SERPL-MCNC: 8.8 MG/DL — SIGNIFICANT CHANGE UP (ref 8.4–10.5)
CALCIUM SERPL-MCNC: 9.2 MG/DL — SIGNIFICANT CHANGE UP (ref 8.4–10.5)
CHLORIDE SERPL-SCNC: 101 MMOL/L — SIGNIFICANT CHANGE UP (ref 98–107)
CHLORIDE SERPL-SCNC: 103 MMOL/L — SIGNIFICANT CHANGE UP (ref 98–107)
CO2 SERPL-SCNC: 25 MMOL/L — SIGNIFICANT CHANGE UP (ref 22–31)
CO2 SERPL-SCNC: 25 MMOL/L — SIGNIFICANT CHANGE UP (ref 22–31)
CREAT SERPL-MCNC: 0.52 MG/DL — SIGNIFICANT CHANGE UP (ref 0.5–1.3)
CREAT SERPL-MCNC: 0.55 MG/DL — SIGNIFICANT CHANGE UP (ref 0.5–1.3)
EGFR: 122 ML/MIN/1.73M2 — SIGNIFICANT CHANGE UP
EGFR: 123 ML/MIN/1.73M2 — SIGNIFICANT CHANGE UP
EOSINOPHIL # BLD AUTO: 0.11 K/UL — SIGNIFICANT CHANGE UP (ref 0–0.5)
EOSINOPHIL NFR BLD AUTO: 2.3 % — SIGNIFICANT CHANGE UP (ref 0–6)
GLUCOSE SERPL-MCNC: 100 MG/DL — HIGH (ref 70–99)
GLUCOSE SERPL-MCNC: 87 MG/DL — SIGNIFICANT CHANGE UP (ref 70–99)
HCT VFR BLD CALC: 28.9 % — LOW (ref 34.5–45)
HEV AB FLD QL: NEGATIVE — SIGNIFICANT CHANGE UP
HEV IGM SER QL: NEGATIVE — SIGNIFICANT CHANGE UP
HGB BLD-MCNC: 9.1 G/DL — LOW (ref 11.5–15.5)
IANC: 2.14 K/UL — SIGNIFICANT CHANGE UP (ref 1.8–7.4)
IMM GRANULOCYTES NFR BLD AUTO: 0.4 % — SIGNIFICANT CHANGE UP (ref 0–0.9)
LYMPHOCYTES # BLD AUTO: 1.99 K/UL — SIGNIFICANT CHANGE UP (ref 1–3.3)
LYMPHOCYTES # BLD AUTO: 41.5 % — SIGNIFICANT CHANGE UP (ref 13–44)
MAGNESIUM SERPL-MCNC: 1.6 MG/DL — SIGNIFICANT CHANGE UP (ref 1.6–2.6)
MCHC RBC-ENTMCNC: 26.6 PG — LOW (ref 27–34)
MCHC RBC-ENTMCNC: 31.5 GM/DL — LOW (ref 32–36)
MCV RBC AUTO: 84.5 FL — SIGNIFICANT CHANGE UP (ref 80–100)
MITOCHONDRIA AB SER-ACNC: SIGNIFICANT CHANGE UP
MONOCYTES # BLD AUTO: 0.5 K/UL — SIGNIFICANT CHANGE UP (ref 0–0.9)
MONOCYTES NFR BLD AUTO: 10.4 % — SIGNIFICANT CHANGE UP (ref 2–14)
NEUTROPHILS # BLD AUTO: 2.14 K/UL — SIGNIFICANT CHANGE UP (ref 1.8–7.4)
NEUTROPHILS NFR BLD AUTO: 44.6 % — SIGNIFICANT CHANGE UP (ref 43–77)
NRBC # BLD: 0 /100 WBCS — SIGNIFICANT CHANGE UP (ref 0–0)
NRBC # FLD: 0 K/UL — SIGNIFICANT CHANGE UP (ref 0–0)
PHOSPHATE SERPL-MCNC: 3.6 MG/DL — SIGNIFICANT CHANGE UP (ref 2.5–4.5)
PLATELET # BLD AUTO: 142 K/UL — LOW (ref 150–400)
POTASSIUM SERPL-MCNC: 2.8 MMOL/L — CRITICAL LOW (ref 3.5–5.3)
POTASSIUM SERPL-MCNC: 3.5 MMOL/L — SIGNIFICANT CHANGE UP (ref 3.5–5.3)
POTASSIUM SERPL-SCNC: 2.8 MMOL/L — CRITICAL LOW (ref 3.5–5.3)
POTASSIUM SERPL-SCNC: 3.5 MMOL/L — SIGNIFICANT CHANGE UP (ref 3.5–5.3)
PROT SERPL-MCNC: 6.1 G/DL — SIGNIFICANT CHANGE UP (ref 6–8.3)
PROT SERPL-MCNC: 7 G/DL — SIGNIFICANT CHANGE UP (ref 6–8.3)
RBC # BLD: 3.42 M/UL — LOW (ref 3.8–5.2)
RBC # FLD: 15.5 % — HIGH (ref 10.3–14.5)
RH IG SCN BLD-IMP: POSITIVE — SIGNIFICANT CHANGE UP
SMOOTH MUSCLE AB SER-ACNC: SIGNIFICANT CHANGE UP
SODIUM SERPL-SCNC: 137 MMOL/L — SIGNIFICANT CHANGE UP (ref 135–145)
SODIUM SERPL-SCNC: 138 MMOL/L — SIGNIFICANT CHANGE UP (ref 135–145)
WBC # BLD: 4.8 K/UL — SIGNIFICANT CHANGE UP (ref 3.8–10.5)
WBC # FLD AUTO: 4.8 K/UL — SIGNIFICANT CHANGE UP (ref 3.8–10.5)

## 2023-08-15 PROCEDURE — 99232 SBSQ HOSP IP/OBS MODERATE 35: CPT | Mod: GC

## 2023-08-15 PROCEDURE — 76816 OB US FOLLOW-UP PER FETUS: CPT | Mod: 26

## 2023-08-15 PROCEDURE — 76820 UMBILICAL ARTERY ECHO: CPT | Mod: 26,59

## 2023-08-15 PROCEDURE — 76819 FETAL BIOPHYS PROFIL W/O NST: CPT | Mod: 26,59

## 2023-08-15 PROCEDURE — 99232 SBSQ HOSP IP/OBS MODERATE 35: CPT

## 2023-08-15 RX ORDER — POTASSIUM CHLORIDE 20 MEQ
10 PACKET (EA) ORAL
Refills: 0 | Status: COMPLETED | OUTPATIENT
Start: 2023-08-15 | End: 2023-08-15

## 2023-08-15 RX ORDER — LIDOCAINE 4 G/100G
4 CREAM TOPICAL ONCE
Refills: 0 | Status: DISCONTINUED | OUTPATIENT
Start: 2023-08-15 | End: 2023-08-15

## 2023-08-15 RX ORDER — HEPARIN SODIUM 5000 [USP'U]/ML
5000 INJECTION INTRAVENOUS; SUBCUTANEOUS EVERY 12 HOURS
Refills: 0 | Status: DISCONTINUED | OUTPATIENT
Start: 2023-08-15 | End: 2023-09-03

## 2023-08-15 RX ORDER — TETRACAINE/BENZOCAINE/BUTAMBEN 2%-14%-2%
1 OINTMENT (GRAM) TOPICAL ONCE
Refills: 0 | Status: COMPLETED | OUTPATIENT
Start: 2023-08-15 | End: 2023-08-15

## 2023-08-15 RX ADMIN — Medication 100 MILLIEQUIVALENT(S): at 12:05

## 2023-08-15 RX ADMIN — HEPARIN SODIUM 5000 UNIT(S): 5000 INJECTION INTRAVENOUS; SUBCUTANEOUS at 22:44

## 2023-08-15 RX ADMIN — HEPARIN SODIUM 5000 UNIT(S): 5000 INJECTION INTRAVENOUS; SUBCUTANEOUS at 12:00

## 2023-08-15 RX ADMIN — DEXTROSE MONOHYDRATE, SODIUM CHLORIDE, AND POTASSIUM CHLORIDE 125 MILLILITER(S): 50; .745; 4.5 INJECTION, SOLUTION INTRAVENOUS at 06:09

## 2023-08-15 RX ADMIN — PANTOPRAZOLE SODIUM 40 MILLIGRAM(S): 20 TABLET, DELAYED RELEASE ORAL at 06:06

## 2023-08-15 RX ADMIN — PANTOPRAZOLE SODIUM 40 MILLIGRAM(S): 20 TABLET, DELAYED RELEASE ORAL at 17:26

## 2023-08-15 RX ADMIN — DEXTROSE MONOHYDRATE, SODIUM CHLORIDE, AND POTASSIUM CHLORIDE 125 MILLILITER(S): 50; .745; 4.5 INJECTION, SOLUTION INTRAVENOUS at 21:40

## 2023-08-15 RX ADMIN — Medication 100 MILLIEQUIVALENT(S): at 10:26

## 2023-08-15 RX ADMIN — Medication 1 SPRAY(S): at 17:28

## 2023-08-15 RX ADMIN — SODIUM CHLORIDE 125 MILLILITER(S): 9 INJECTION, SOLUTION INTRAVENOUS at 13:41

## 2023-08-15 RX ADMIN — Medication 100 MILLIEQUIVALENT(S): at 08:36

## 2023-08-15 RX ADMIN — DEXTROSE MONOHYDRATE, SODIUM CHLORIDE, AND POTASSIUM CHLORIDE 125 MILLILITER(S): 50; .745; 4.5 INJECTION, SOLUTION INTRAVENOUS at 08:35

## 2023-08-15 NOTE — PROGRESS NOTE ADULT - NS ATTEND AMEND GEN_ALL_CORE FT
I agree with the above history, physical examination, chief complaint/diagnosis, and plan, which I have reviewed and edited where appropriate.  I agree with notes/assessment and detailed interval history of health care providers on my service.  I have seen and examined the patient.  I reviewed the laboratory and available data and agree with the history, physical assessment and plan.  I reviewed and discussed with all consultants, house staff and PA's.  The Nutrition Support Team (NST) discusses on an ongoing basis with the primary team and all consultants, House staff and PA's to have a permanent risk benefit analyses on all decisions and coordinating care.  I was physically present for the key portions of the evaluation and management (E/M) service provided.  39 y/o  33 wks pregnant with progressive difficulty swallowing.  Patient is currently undergoing GI workup.   PHYSICAL EXAM:  Constitutional: no acute distress  Gastrointestinal: abdomen nontender, +gravid  Extremities: warm   recommend Ensure clears   Will follow up with GI recommendations.

## 2023-08-15 NOTE — PROGRESS NOTE ADULT - ASSESSMENT
39 y/o  33w3d presents with intolerance of po. She states she has had worsening dysphagia since the end of  when she was sick with covid. GI consulted for dysphagia.    #Dysphagia. New since the end of . Patient states it is to solids and liquids. Ddx includes reflux esophagitis, esophageal stricture, esophageal dysmotility  - Xray esophagram without evidence of esophageal dysmotility or mechanical obstructio  #Elevated transaminases: no known hx of liver disease. Noted to be uptrending since  in hepatocellular pattern. Denies use of OTC or herbal medications. Patient without HTN, thrombocytopenia, only trace protein in urine.   - downtrending but w/u so far unrevealing  - RUQ US unremarkable  - viral w/u w/out evidence of infection    Recommendations  - will plan for both EGD and endoflip on Friday (when our motility specialist Dr. Escalante will be at [facility])  - recommend placement of NGT and initiation of TFs prior to then. Please verify location of NGT in stomach w/ CXR prior to initiation of TFs.  - continue protonix 40 mg IV BID  - f/u IMER, anti-smooth muscle antibody, immunoglobulin panel     GI will continue to follow.     All recommendations are tentative until note is attested by attending.     [place] Tong, PGY5  Gastroenterology/Hepatology Fellow  Available on Microsoft Teams  [phone] ([facility] Short Range Pager)  [phone] (Long Range Pager)    After 5pm, please contact the on-call GI fellow. [phone]

## 2023-08-15 NOTE — PROGRESS NOTE ADULT - SUBJECTIVE AND OBJECTIVE BOX
Gastroenterology/Hepatology Progress Note    Interval Events:   Patient's EGD postponed due to significant hypokalemia on morning labs.  Patient continues to have sensation of foods getting stuck in her chest and SOB after eating.    Allergies:  No Known Drug Allergies  shellfish (Hives)  Beef (Unknown)    Hospital Medications:  dextrose 5% + lactated ringers with potassium chloride 20 mEq/L 1000 milliLiter(s) IV Continuous <Continuous>  folic acid 1 milliGRAM(s) Oral daily  heparin   Injectable 5000 Unit(s) SubCutaneous every 12 hours  pantoprazole  Injectable 40 milliGRAM(s) IV Push every 12 hours  prenatal multivitamin 1 Tablet(s) Oral daily  sodium chloride 0.9% 1000 milliLiter(s) IV Continuous <Continuous>  tetracaine/benzocaine/butamben Spray 1 Spray(s) Topical once    ROS: 14 point ROS negative unless otherwise state in subjective    PHYSICAL EXAM:   Vital Signs:  Vital Signs Last 24 Hrs  T(C): 36.2 (15 Aug 2023 13:42), Max: 36.8 (14 Aug 2023 22:00)  T(F): 97.2 (15 Aug 2023 13:42), Max: 98.3 (14 Aug 2023 22:00)  HR: 92 (15 Aug 2023 13:42) (81 - 137)  BP: 99/65 (15 Aug 2023 13:42) (99/65 - 114/61)  BP(mean): --  RR: 18 (15 Aug 2023 13:42) (16 - 18)  SpO2: 99% (15 Aug 2023 13:42) (98% - 100%)    Parameters below as of 15 Aug 2023 13:42  Patient On (Oxygen Delivery Method): room air    Daily Height in cm: 167.6 (15 Aug 2023 10:31)    Daily Weight in k.5 (15 Aug 2023 11:03)    GENERAL:  No acute distress  HEENT:  NCAT, no scleral icterus  CHEST: no resp distress  HEART:  RRR  ABDOMEN:  Soft, non-tender, non-distended   EXTREMITIES:  No cyanosis, clubbing, or edema  SKIN:  No rash/erythema/ecchymoses   NEURO:  Alert and oriented x 3     LABS:                        9.1    4.80  )-----------( 142      ( 15 Aug 2023 06:11 )             28.9     Mean Cell Volume: 84.5 fL (08-15-23 @ 06:11)    08-15    137  |  101  |  <2<L>  ----------------------------<  87  3.5   |  25  |  0.52    Ca    9.2      15 Aug 2023 15:40  Phos  3.6     08-15  Mg     1.60     08-15    TPro  7.0  /  Alb  3.7  /  TBili  0.6  /  DBili  x   /  AST  72<H>  /  ALT  87<H>  /  AlkPhos  161<H>  08-15    LIVER FUNCTIONS - ( 15 Aug 2023 15:40 )  Alb: 3.7 g/dL / Pro: 7.0 g/dL / ALK PHOS: 161 U/L / ALT: 87 U/L / AST: 72 U/L / GGT: x           PT/INR - ( 14 Aug 2023 06:21 )   PT: 11.5 sec;   INR: 1.03 ratio         PTT - ( 14 Aug 2023 06:21 )  PTT:29.9 sec  Urinalysis Basic - ( 15 Aug 2023 15:40 )    Color: x / Appearance: x / SG: x / pH: x  Gluc: 87 mg/dL / Ketone: x  / Bili: x / Urobili: x   Blood: x / Protein: x / Nitrite: x   Leuk Esterase: x / RBC: x / WBC x   Sq Epi: x / Non Sq Epi: x / Bacteria: x    Imaging:     ACC: [id number] EXAM:  XR ESOPH DBL CON STUDY   ORDERED BY: ANGEL CANCHOLA     PROCEDURE DATE:  2023          INTERPRETATION:  CLINICAL INFORMATION: Pregnant patient with dysphagia   and pronounced weight loss. Sensation of food getting stuck in the throat   and upper chest..    TECHNIQUE: A double contrast esophagram was performed under fluoroscopy   utilizing barium as the contrast agent and multiple fluoroscopic spot and   cine images were taken in AP and oblique projections.    FLUOROSCOPY TIME: 2.3 minutes.    COMPARISON: No similar examinations were available for comparison.    FINDINGS:    Preliminary  radiograph of the chest is unremarkable.    The patient swallowed barium without difficulty. The esophagus   demonstrates normal distensibility, contour and course. There is no   abnormal extrinsic mass effect. Contrast passes freely through the   gastroesophageal junction. The patient swallowed a barium tablet without   difficulty.      IMPRESSION:    No evidence of esophageal dysmotility or mechanical obstruction..    --- End of Report ---          SALVATORE ROSS MD; Resident Radiologist  This document has been electronically signed.  DORIS OSEI MD; Attending Radiologist  This document has been electronically signed. Aug 14 2023  1:57PM       Gastroenterology/Hepatology Progress Note    Interval Events:   Patient initially hesitant to pursue EGD and unable to offer today given severe hypokalemia  Patient continues to have sensation of foods getting stuck in her chest and SOB after eating.    Allergies:  No Known Drug Allergies  shellfish (Hives)  Beef (Unknown)    Hospital Medications:  dextrose 5% + lactated ringers with potassium chloride 20 mEq/L 1000 milliLiter(s) IV Continuous <Continuous>  folic acid 1 milliGRAM(s) Oral daily  heparin   Injectable 5000 Unit(s) SubCutaneous every 12 hours  pantoprazole  Injectable 40 milliGRAM(s) IV Push every 12 hours  prenatal multivitamin 1 Tablet(s) Oral daily  sodium chloride 0.9% 1000 milliLiter(s) IV Continuous <Continuous>  tetracaine/benzocaine/butamben Spray 1 Spray(s) Topical once    ROS: 14 point ROS negative unless otherwise state in subjective    PHYSICAL EXAM:   Vital Signs:  Vital Signs Last 24 Hrs  T(C): 36.2 (15 Aug 2023 13:42), Max: 36.8 (14 Aug 2023 22:00)  T(F): 97.2 (15 Aug 2023 13:42), Max: 98.3 (14 Aug 2023 22:00)  HR: 92 (15 Aug 2023 13:42) (81 - 137)  BP: 99/65 (15 Aug 2023 13:42) (99/65 - 114/61)  BP(mean): --  RR: 18 (15 Aug 2023 13:42) (16 - 18)  SpO2: 99% (15 Aug 2023 13:42) (98% - 100%)    Parameters below as of 15 Aug 2023 13:42  Patient On (Oxygen Delivery Method): room air    Daily Height in cm: 167.6 (15 Aug 2023 10:31)    Daily Weight in k.5 (15 Aug 2023 11:03)    GENERAL:  No acute distress  HEENT:  NCAT, no scleral icterus  CHEST: no resp distress  HEART:  RRR  ABDOMEN:  Soft, non-tender, non-distended   EXTREMITIES:  No cyanosis, clubbing, or edema  SKIN:  No rash/erythema/ecchymoses   NEURO:  Alert and oriented x 3     LABS:                        9.1    4.80  )-----------( 142      ( 15 Aug 2023 06:11 )             28.9     Mean Cell Volume: 84.5 fL (08-15-23 @ 06:11)    08-15    137  |  101  |  <2<L>  ----------------------------<  87  3.5   |  25  |  0.52    Ca    9.2      15 Aug 2023 15:40  Phos  3.6     08-15  Mg     1.60     08-15    TPro  7.0  /  Alb  3.7  /  TBili  0.6  /  DBili  x   /  AST  72<H>  /  ALT  87<H>  /  AlkPhos  161<H>  08-15    LIVER FUNCTIONS - ( 15 Aug 2023 15:40 )  Alb: 3.7 g/dL / Pro: 7.0 g/dL / ALK PHOS: 161 U/L / ALT: 87 U/L / AST: 72 U/L / GGT: x           PT/INR - ( 14 Aug 2023 06:21 )   PT: 11.5 sec;   INR: 1.03 ratio         PTT - ( 14 Aug 2023 06:21 )  PTT:29.9 sec  Urinalysis Basic - ( 15 Aug 2023 15:40 )    Color: x / Appearance: x / SG: x / pH: x  Gluc: 87 mg/dL / Ketone: x  / Bili: x / Urobili: x   Blood: x / Protein: x / Nitrite: x   Leuk Esterase: x / RBC: x / WBC x   Sq Epi: x / Non Sq Epi: x / Bacteria: x    Imaging:     ACC: [id number] EXAM:  XR ESOPH DBL CON STUDY   ORDERED BY: ANGEL CANCHOLA     PROCEDURE DATE:  2023          INTERPRETATION:  CLINICAL INFORMATION: Pregnant patient with dysphagia   and pronounced weight loss. Sensation of food getting stuck in the throat   and upper chest..    TECHNIQUE: A double contrast esophagram was performed under fluoroscopy   utilizing barium as the contrast agent and multiple fluoroscopic spot and   cine images were taken in AP and oblique projections.    FLUOROSCOPY TIME: 2.3 minutes.    COMPARISON: No similar examinations were available for comparison.    FINDINGS:    Preliminary  radiograph of the chest is unremarkable.    The patient swallowed barium without difficulty. The esophagus   demonstrates normal distensibility, contour and course. There is no   abnormal extrinsic mass effect. Contrast passes freely through the   gastroesophageal junction. The patient swallowed a barium tablet without   difficulty.      IMPRESSION:    No evidence of esophageal dysmotility or mechanical obstruction..    --- End of Report ---          SALVATORE ROSS MD; Resident Radiologist  This document has been electronically signed.  DORIS OSEI MD; Attending Radiologist  This document has been electronically signed. Aug 14 2023  1:57PM

## 2023-08-15 NOTE — PROGRESS NOTE ADULT - ATTENDING COMMENTS
No significant change in symptoms/complaints.     # Dysphagia, globus sensation: Suspect largely functional component, but possibly underlying stricture vs dysmotility  # Poor PO intake  # Chest, throat and head "tightness" with reports of subjective difficulty with inspiration: Etiology unclear. Suspect possible anxiety component (as noted by partner/family at bedside).   # Elevated liver tests    --Tentatively plan for EGD +/- EndoFLIP on Friday 8/18 if medically optimized. Will need fetal monitoring arranged in ferny-procedural period.   --Suggest NGT for nutrition in the meantime if patient amenable  --Continue with high dose PPI trial  --Liver serology workup as above  --Trend liver tests, if rising and/or associated with worsening thrombocytopenia, suggest hepatology consultation    Case reviewed with motility specialist, Dr. Escalante, who agrees to assist with EndoFLIP if indicated. Additional recommendations as above.

## 2023-08-15 NOTE — PROGRESS NOTE ADULT - ATTENDING COMMENTS
patient managed a few bites of fish and mashed potatoes today  for EGD tomorrow  replacing potassium

## 2023-08-15 NOTE — CHART NOTE - NSCHARTNOTEFT_GEN_A_CORE
"Anesthesia Transfer of Care Note    Patient: Stuart Guevara    Procedure(s) Performed: Procedure(s) (LRB):  LAPAROTOMY, EXPLORATORY (N/A)    Patient location: ICU    Anesthesia Type: general    Transport from OR: Upon arrival to PACU/ICU, patient attached to ventilator and auscultated to confirm bilateral breath sounds and adequate TV. Transported from OR intubated on 100% O2 by AMBU with adequate controlled ventilation. Continuous ECG monitoring in transport. Continuous SpO2 monitoring in transport. Continuos invasive BP monitoring in transport    Post pain: adequate analgesia    Post assessment: no apparent anesthetic complications    Post vital signs: stable    Level of consciousness: sedated    Complications: none    Transfer of care protocol was followed      Last vitals:   Visit Vitals  BP (!) 149/69   Pulse 98   Temp 37.1 °C (98.7 °F) (Oral)   Resp (!) 32   Ht 5' 5" (1.651 m)   Wt 60 kg (132 lb 4.4 oz)   SpO2 (!) 88%   BMI 22.01 kg/m²     " PA Note    Notified by DHEERAJ Tong (GI Fellow) that patient's EGD will be rescheduled due to hypokalemia. Patient currently being repleted with IV potassium. Will obtain labs after 3rd bag of potassium to see if more repletion is needed. Patient notified that procedure has been cancelled for today. MD Tong to notify new time for procedure tomorrow    ICU Vital Signs Last 24 Hrs  T(C): 36.8 (15 Aug 2023 09:54), Max: 37.1 (14 Aug 2023 16:57)  T(F): 98.3 (15 Aug 2023 09:54), Max: 98.8 (14 Aug 2023 16:57)  HR: 81 (15 Aug 2023 09:54) (75 - 193)  BP: 105/62 (15 Aug 2023 09:54) (103/65 - 114/70)  BP(mean): --  ABP: --  ABP(mean): --  RR: 16 (15 Aug 2023 09:54) (16 - 17)  SpO2: 100% (15 Aug 2023 09:54) (98% - 100%)    O2 Parameters below as of 15 Aug 2023 09:54  Patient On (Oxygen Delivery Method): room air        Physical Exam:  General: NAD, well appearing  Abdomen: gravid, NT, NR, NG    Assessment:   39yo  @33w5d admitted for inability to tolerate PO.     Plan:  -Clears w/ clear ensure (per TPN team, pager [phone])  -BID monitoring  -BPP by ATU to be performed  -NPO after midnight  -New scheduled for EGD to be released at ~230p to solidify time for procedure tomorrow     Laurel Monae PA-C

## 2023-08-15 NOTE — PROGRESS NOTE ADULT - SUBJECTIVE AND OBJECTIVE BOX
R3 Antepartum Note, HD#5    Patient seen and examined at bedside. Reports several bites of soup that she was able to swallow with sensation of stricture in the mid-esophagus. No nausea, emesis or dyspepsia. Tolerating sips of water. Reports nothing since midnight as patient desires an EGD this morning. Reports tightening of the abdomen yesterday - non bothersome. + Chills, denies fevers.  Pt reports +FM, denies LOF, VB, ctx, HA, epigastric pain, blurred vision, CP, SOB,    Vital Signs Last 24 Hours  T(C): 36.8 (08-15-23 @ 05:18), Max: 37.1 (08-14-23 @ 16:57)  HR: 85 (08-15-23 @ 05:19) (75 - 193)  BP: 108/74 (08-15-23 @ 05:18) (103/65 - 114/70)  RR: 16 (08-15-23 @ 05:18) (16 - 17)  SpO2: 100% (08-15-23 @ 05:19) (98% - 100%)    CAPILLARY BLOOD GLUCOSE    Physical Exam:  General: NAD  Abdomen: Soft, non-tender, gravid  Ext: No pain or swelling    NST reactive overnight    Labs:             9.1    4.80  )-----------( 142      ( 08-15 @ 06:11 )             28.9           PT/INR - ( 08-14 @ 06:21 )   PT: 11.5 sec;   INR: 1.03 ratio    PTT - ( 08-14 @ 06:21 )  PTT:29.9 sec        MEDICATIONS  (STANDING):  dextrose 5% + lactated ringers with potassium chloride 20 mEq/L 1000 milliLiter(s) (125 mL/Hr) IV Continuous <Continuous>  folic acid 1 milliGRAM(s) Oral daily  heparin   Injectable 5000 Unit(s) SubCutaneous every 12 hours  pantoprazole  Injectable 40 milliGRAM(s) IV Push every 12 hours  prenatal multivitamin 1 Tablet(s) Oral daily  sodium chloride 0.9% 1000 milliLiter(s) (125 mL/Hr) IV Continuous <Continuous>    MEDICATIONS  (PRN):

## 2023-08-15 NOTE — PROGRESS NOTE ADULT - SUBJECTIVE AND OBJECTIVE BOX
NUTRITION NOTE  [id number] AHMED  ===============================    Interval events - Patient was seen and examined at bedside, no acute events overnight. Patient denies chest pain, shortness of breath, nausea or vomiting at this time. XRay barium esophagram on 23 without abnormality. Per primary team, plan for EGD tomorrow.      ROS: Except as noted above, all other systems reviewed and are negative     Allergies  No Known Drug Allergies  shellfish (Hives)    PAST MEDICAL & SURGICAL HISTORY:  GERD (gastroesophageal reflux disease)  History of dilation and curettage    FAMILY HISTORY:  Family history of diabetes mellitus (DM) (Mother)  Family history of hypertension (Mother, Father)  Family history of stroke (Aunt)    Vital Signs Last 24 Hrs  T(C): 36.8 (15 Aug 2023 09:54), Max: 37.1 (14 Aug 2023 16:57)  T(F): 98.3 (15 Aug 2023 09:54), Max: 98.8 (14 Aug 2023 16:57)  HR: 81 (15 Aug 2023 09:54) (75 - 193)  BP: 105/62 (15 Aug 2023 09:54) (103/65 - 114/70)  RR: 16 (15 Aug 2023 09:54) (16 - 17)  SpO2: 100% (15 Aug 2023 09:54) (98% - 100%)    MEDICATIONS  (STANDING):  dextrose 5% + lactated ringers with potassium chloride 20 mEq/L 1000 milliLiter(s) (125 mL/Hr) IV Continuous <Continuous>  folic acid 1 milliGRAM(s) Oral daily  pantoprazole  Injectable 40 milliGRAM(s) IV Push every 12 hours  potassium chloride  10 mEq/100 mL IVPB 10 milliEquivalent(s) IV Intermittent every 1 hour  prenatal multivitamin 1 Tablet(s) Oral daily  sodium chloride 0.9% 1000 milliLiter(s) (125 mL/Hr) IV Continuous <Continuous>    I&O's Detail    14 Aug 2023 07:01  -  15 Aug 2023 07:00  --------------------------------------------------------  IN:    dextrose 5% + lactated ringers w/ Additives: 1625 mL  Total IN: 1625 mL    OUT:  Total OUT: 0 mL    Total NET: 1625 mL    Daily Height in cm: 167.6 (15 Aug 2023 10:31)    Daily Weight in k.5 (15 Aug 2023 11:03)    Drug Dosing Weight  Height (cm): 167.6 (15 Aug 2023 10:31)  Weight (kg): 54 (15 Aug 2023 10:31)  BMI (kg/m2): 19.2 (15 Aug 2023 10:31)  BSA (m2): 1.6 (15 Aug 2023 10:31)    PHYSICAL EXAM:  Constitutional: no acute distress, sitting comfortably in bed   Gastrointestinal: abdomen nontender, +gravid  Extremities: warm to touch, no pain or swelling     LABORATORY                        9.1    4.80  )-----------( 142      ( 15 Aug 2023 06:11 )             28.9     08-15    138  |  103  |  <2<L>  ----------------------------<  100<H>  2.8<LL>   |  25  |  0.55    Ca    8.8      15 Aug 2023 06:11  Phos  3.6     08-15  Mg     1.60     08-15    TPro  6.1  /  Alb  3.1<L>  /  TBili  0.5  /  DBili  x   /  AST  64<H>  /  ALT  78<H>  /  AlkPhos  137<H>  08-15    LIVER FUNCTIONS - ( 15 Aug 2023 06:11 )  Alb: 3.1 g/dL / Pro: 6.1 g/dL / ALK PHOS: 137 U/L / ALT: 78 U/L / AST: 64 U/L / GGT: x           Barium esophagram (); Able to swallow contrast and barium pill without difficulty. No stricture or mass effect noted.     ASSESSMENT/PLAN:  37 y/o  33 wks pregnant c/b IUGR3% UAD normal () p/w 2 days of inability to tolerate po. Patient has had progressive difficulty swallowing food since covid 1.5 months ago.. Pt reports a pureed diet for the past two weeks. Nutrition support consult called for evaluation for parenteral nutrition. Patient meets criteria for severe protein calorie malnutrition based on weight loss 8.5% in < 3 months, predicted caloric intake <50% nutrition needs >/=5 days. Patient is currently undergoing GI workup.     recommend Ensure clears     will follow up with GI recommendations after EGD    will continue to follow     Nutrition Support [phone]

## 2023-08-15 NOTE — CHART NOTE - NSCHARTNOTEFT_GEN_A_CORE
PA Note    MD Emmanuel at bedside to place NG feeding tube. Cetacaine spray administered by DARREL Laws. While measuring length of tubing, patient reports that she "can't do this" and is refusing placement at this time.    Laurel Monae PA-C

## 2023-08-15 NOTE — PROGRESS NOTE ADULT - ASSESSMENT
39 y/o  33w5d pregnancy c/b IUGR3% UAD normal () p/w 2 days of inability to tolerate po. Ever since she contracted Covid 1.5 months ago patient has had progressive difficulty swallowing food. Pt reports a pureed diet for the past two weeks. Pt admitted for dysphagia workup.  Pt without improvement IV Protonix.  XRay barium esophagram without abnormality Pt requesting EGD this AM.      # Dysphagia  - CMP this morning. Replete electrolytes as indicated.   - ENT following, preformed flexibles endoscope. No acute intervention. F/U outpatient   - GI following, appreciate recs. Protonix 40mg IV, if no improvement with consider a EGD barium esophagram.   - Barium esophagram (); Able to swallow contrast and barium pill without difficulty. No stricture or mass effect noted.   - Consider EGD today.   - F/U lab evaluation for viral and autoimmune etiologies. No cause identified today.   - F/u SW recs  - Daily weights.   - Nutrition following.   - TPN consult placed. Seen . Will consider pending GI recommendations and results of the barium esophogram.     #Transaminitis/elevated lipase  - AST/ALT: 111/123 -> 74/85->54/78   - Lipase 94  - RUQ sono (): WNL.      # IUGR  - ATU (), EFW 1320g (3%) normal UAD   - For ATU scan on 8/15  - Repeat growth 3-4 weeks from prior    #Fetal wellbeing  - NST BID  - BPP 2x/wk  - PNV    #Maternal wellbeing  - HSQ for DVT ppx    COOPER Ford, pgy-3

## 2023-08-16 LAB
ALBUMIN SERPL ELPH-MCNC: 2.8 G/DL — LOW (ref 3.3–5)
ALP SERPL-CCNC: 133 U/L — HIGH (ref 40–120)
ALT FLD-CCNC: 69 U/L — HIGH (ref 4–33)
ANA TITR SER: NEGATIVE — SIGNIFICANT CHANGE UP
ANION GAP SERPL CALC-SCNC: 13 MMOL/L — SIGNIFICANT CHANGE UP (ref 7–14)
AST SERPL-CCNC: 55 U/L — HIGH (ref 4–32)
B19V IGG SER-ACNC: 3.18 INDEX — HIGH (ref 0–0.9)
B19V IGG+IGM SER-IMP: POSITIVE
B19V IGG+IGM SER-IMP: SIGNIFICANT CHANGE UP
B19V IGM FLD-ACNC: 0.18 INDEX — SIGNIFICANT CHANGE UP (ref 0–0.9)
B19V IGM SER-ACNC: NEGATIVE — SIGNIFICANT CHANGE UP
BILIRUB SERPL-MCNC: 0.5 MG/DL — SIGNIFICANT CHANGE UP (ref 0.2–1.2)
BUN SERPL-MCNC: <2 MG/DL — LOW (ref 7–23)
CALCIUM SERPL-MCNC: 8.6 MG/DL — SIGNIFICANT CHANGE UP (ref 8.4–10.5)
CHLORIDE SERPL-SCNC: 104 MMOL/L — SIGNIFICANT CHANGE UP (ref 98–107)
CO2 SERPL-SCNC: 23 MMOL/L — SIGNIFICANT CHANGE UP (ref 22–31)
CREAT SERPL-MCNC: 0.54 MG/DL — SIGNIFICANT CHANGE UP (ref 0.5–1.3)
EGFR: 122 ML/MIN/1.73M2 — SIGNIFICANT CHANGE UP
GLUCOSE SERPL-MCNC: 97 MG/DL — SIGNIFICANT CHANGE UP (ref 70–99)
HCT VFR BLD CALC: 27.1 % — LOW (ref 34.5–45)
HGB BLD-MCNC: 8.5 G/DL — LOW (ref 11.5–15.5)
MAGNESIUM SERPL-MCNC: 1.6 MG/DL — SIGNIFICANT CHANGE UP (ref 1.6–2.6)
MCHC RBC-ENTMCNC: 26.8 PG — LOW (ref 27–34)
MCHC RBC-ENTMCNC: 31.4 GM/DL — LOW (ref 32–36)
MCV RBC AUTO: 85.5 FL — SIGNIFICANT CHANGE UP (ref 80–100)
NRBC # BLD: 0 /100 WBCS — SIGNIFICANT CHANGE UP (ref 0–0)
NRBC # FLD: 0 K/UL — SIGNIFICANT CHANGE UP (ref 0–0)
PHOSPHATE SERPL-MCNC: 3.1 MG/DL — SIGNIFICANT CHANGE UP (ref 2.5–4.5)
PLATELET # BLD AUTO: 142 K/UL — LOW (ref 150–400)
POTASSIUM SERPL-MCNC: 3.1 MMOL/L — LOW (ref 3.5–5.3)
POTASSIUM SERPL-SCNC: 3.1 MMOL/L — LOW (ref 3.5–5.3)
PROT SERPL-MCNC: 5.8 G/DL — LOW (ref 6–8.3)
RBC # BLD: 3.17 M/UL — LOW (ref 3.8–5.2)
RBC # FLD: 15.4 % — HIGH (ref 10.3–14.5)
SODIUM SERPL-SCNC: 140 MMOL/L — SIGNIFICANT CHANGE UP (ref 135–145)
WBC # BLD: 4.6 K/UL — SIGNIFICANT CHANGE UP (ref 3.8–10.5)
WBC # FLD AUTO: 4.6 K/UL — SIGNIFICANT CHANGE UP (ref 3.8–10.5)

## 2023-08-16 PROCEDURE — 99232 SBSQ HOSP IP/OBS MODERATE 35: CPT | Mod: GC

## 2023-08-16 PROCEDURE — 99233 SBSQ HOSP IP/OBS HIGH 50: CPT | Mod: GC

## 2023-08-16 RX ORDER — POTASSIUM CHLORIDE 20 MEQ
10 PACKET (EA) ORAL
Refills: 0 | Status: COMPLETED | OUTPATIENT
Start: 2023-08-16 | End: 2023-08-16

## 2023-08-16 RX ADMIN — PANTOPRAZOLE SODIUM 40 MILLIGRAM(S): 20 TABLET, DELAYED RELEASE ORAL at 06:15

## 2023-08-16 RX ADMIN — Medication 100 MILLIEQUIVALENT(S): at 13:15

## 2023-08-16 RX ADMIN — Medication 100 MILLIEQUIVALENT(S): at 10:13

## 2023-08-16 RX ADMIN — Medication 100 MILLIEQUIVALENT(S): at 12:16

## 2023-08-16 RX ADMIN — SODIUM CHLORIDE 125 MILLILITER(S): 9 INJECTION, SOLUTION INTRAVENOUS at 10:18

## 2023-08-16 RX ADMIN — PANTOPRAZOLE SODIUM 40 MILLIGRAM(S): 20 TABLET, DELAYED RELEASE ORAL at 19:12

## 2023-08-16 RX ADMIN — HEPARIN SODIUM 5000 UNIT(S): 5000 INJECTION INTRAVENOUS; SUBCUTANEOUS at 10:13

## 2023-08-16 RX ADMIN — HEPARIN SODIUM 5000 UNIT(S): 5000 INJECTION INTRAVENOUS; SUBCUTANEOUS at 21:23

## 2023-08-16 NOTE — CHART NOTE - NSCHARTNOTEFT_GEN_A_CORE
Nutrition f/u - Severe Malnutrition     OB Resident Note - 37 y/o  33w5d pregnancy c/b IUGR3% UAD normal () p/w 2 days of inability to tolerate po. Ever since she contracted Covid 1.5 months ago patient has had progressive difficulty swallowing food. Pt reports a pureed diet for the past two weeks. Pt admitted for dysphagia workup. Continues to have poor PO intake with maternal weight loss and restricted fetal growth.   Tube feeds were advised over PN; per discussion with OB team, NGT attempted and patient then refused. Patient educated on the importance of consuming appropriate nutrition in 3rd trimester of pregnancy. Plan for patient to undergo EGD for motility study. RD suggested change in oral supplement pending w/u in efforts to optimize overall nutritional intake. Suggest [place] Boot Standard 1.4cal/mL (provides 455kcal / 20gm protein / 11 fl oz) 3 times daily.     Diet, Regular:   Supplement Feeding Modality:  Oral  Ensure Clear Cans or Servings Per Day:  3       Frequency:  Three Times a day (08-15- @ 18:11)    Anthropometrics: Height (cm): 167.6 (08-15)  Weight (kg): 54 (08-15)  BMI (kg/m2): 19.2 (08-15)    _______________ Pertinent Medications_______________  MEDICATIONS  (STANDING):  dextrose 5% + lactated ringers with potassium chloride 20 mEq/L 1000 milliLiter(s) (125 mL/Hr) IV Continuous <Continuous>  folic acid 1 milliGRAM(s) Oral daily  heparin   Injectable 5000 Unit(s) SubCutaneous every 12 hours  pantoprazole  Injectable 40 milliGRAM(s) IV Push every 12 hours  prenatal multivitamin 1 Tablet(s) Oral daily  sodium chloride 0.9% 1000 milliLiter(s) (125 mL/Hr) IV Continuous <Continuous>    __________________ Pertinent Labs__________________    Na140 mmol/L Glu 97 mg/dL K+ 3.1 mmol/L<L> Cr  0.54 mg/dL BUN <2 mg/dL<L>  Phos 3.1 mg/dL 08-16 Alb 2.8 g/dL<L>    Monitoring and Evaluation:     [ x ] Tolerance to diet prescription / adequacy of meal & supplement intake  [ x ] Weight trends   [ x ] Pertinent labs    Recommendations:  1) Diet / Supplement Changes: Suggest change in supplement from Ensure Clear 3x daily (540 mae and 24 gm protein). times daily  to [place] Boot Standard 1.4cal/mL (provides 455kcal / 20gm protein / 11 fl oz)   2) Obtain and record current weights to best monitor for acute changes in nutritional status.  3) Please consistently document % meal intake in nursing flowsheets.   4) Consider liquid prenatal multivitamin.    Philomena Valle, MS, RDN, CDN  Pager [phone]  Also available on MS Teams Nutrition f/u - Severe Malnutrition     OB Resident Note - 39 y/o  33w5d pregnancy c/b IUGR3% UAD normal () p/w 2 days of inability to tolerate po. Ever since she contracted Covid 1.5 months ago patient has had progressive difficulty swallowing food. Pt reports a pureed diet for the past two weeks. Pt admitted for dysphagia workup. Continues to have poor PO intake with maternal weight loss and restricted fetal growth.   Tube feeds were advised over PN; per discussion with OB team, NGT attempted and patient then refused. Patient educated on the importance of consuming appropriate nutrition in 3rd trimester of pregnancy. Plan is for patient to undergo EGD for motility study on Friday.     RD suggested change in oral supplement pending w/u in efforts to optimize overall nutritional intake. Suggest TELOS Standard 1.4cal/mL (provides 455kcal / 20gm protein / 11 fl oz) 3 times daily. Patient dosing weight 54kg; most current weight 8/15 - 56.6kg in presence of 2+ noted edema to feet and ankles as recorded in nursing flowsheets.    Diet, Regular:   Supplement Feeding Modality:  Oral  Ensure Clear Cans or Servings Per Day:  3       Frequency:  Three Times a day (08-15- @ 18:11)    Anthropometrics: Height (cm): 167.6 (08-15)  Weight (kg): 54 (08-15)  BMI (kg/m2): 19.2 (08-15)    _______________ Pertinent Medications_______________  MEDICATIONS  (STANDING):  dextrose 5% + lactated ringers with potassium chloride 20 mEq/L 1000 milliLiter(s) (125 mL/Hr) IV Continuous <Continuous>  folic acid 1 milliGRAM(s) Oral daily  heparin   Injectable 5000 Unit(s) SubCutaneous every 12 hours  pantoprazole  Injectable 40 milliGRAM(s) IV Push every 12 hours  prenatal multivitamin 1 Tablet(s) Oral daily  sodium chloride 0.9% 1000 milliLiter(s) (125 mL/Hr) IV Continuous <Continuous>    __________________ Pertinent Labs__________________    Na140 mmol/L Glu 97 mg/dL K+ 3.1 mmol/L<L> Cr  0.54 mg/dL BUN <2 mg/dL<L> 08-16 Phos 3.1 mg/dL 08-16 Alb 2.8 g/dL<L>    Monitoring and Evaluation:     [ x ] Tolerance to diet prescription / adequacy of meal & supplement intake  [ x ] Weight trends   [ x ] Pertinent labs    Recommendations:  1) Diet / Supplement Changes: Suggest change in supplement from Ensure Clear 3x daily (540 mae and 24 gm protein). times daily  to TELOS Standard 1.4cal/mL (provides 455kcal / 20gm protein / 11 fl oz). If patient becomes amenable to EN placement, reconsult Nutrition Services for recommendations.    2) Continue to obtain/record current weights.  3) Please consistently document % meal intake in nursing flowsheets.   4) Consider liquid prenatal multivitamin.    Philomena Valle, MS, RDN, CDN  Pager [phone]  Also available on MS Teams

## 2023-08-16 NOTE — PROGRESS NOTE ADULT - SUBJECTIVE AND OBJECTIVE BOX
R3 Antepartum Note, HD#6    Patient seen and examined at bedside. Attempt to place NG tube for tube feeds yesterday afternoon after continued poor PO intake. Pt agreeable during discussion, but after lidocaine was administered pt experienced acute anxiety and refused placement. Declining an attempt this morning. Reports small bites of salmon and mashed potatoes overnight without cough. Continues to reports tightening sensation in throat. Pt reports +FM, denies LOF, VB, ctx, HA, epigastric pain, blurred vision, CP, SOB, N/V, fevers, and chills.    Vital Signs Last 24 Hours  T(C): 36.8 (08-16-23 @ 05:07), Max: 37.1 (08-15-23 @ 21:49)  HR: 62 (08-16-23 @ 05:08) (62 - 92)  BP: 125/60 (08-16-23 @ 05:08) (99/65 - 125/60)  RR: 17 (08-16-23 @ 05:07) (16 - 18)  SpO2: 100% (08-16-23 @ 05:07) (99% - 100%)    CAPILLARY BLOOD GLUCOSE    Physical Exam:  General: NAD  Abdomen: Soft, non-tender, gravid  Ext: No pain or swelling    NST reactive overnight    Labs:             8.5    4.60  )-----------( 142      ( 08-16 @ 05:30 )             27.1     08-16 @ 05:30    140  |  104  |  <2  ----------------------------<  97  3.1   |  23  |  0.54    Ca    8.6      08-16 @ 05:30  Phos  3.1     08-16 @ 05:30  Mg     1.60     08-16 @ 05:30    TPro  5.8  /  Alb  2.8  /  TBili  0.5  /  DBili  x   /  AST  55  /  ALT  69  /  AlkPhos  133  08-16 @ 05:30    PT/INR - ( 08-14 @ 06:21 )   PT: 11.5 sec;   INR: 1.03 ratio    PTT - ( 08-14 @ 06:21 )  PTT:29.9 sec        MEDICATIONS  (STANDING):  dextrose 5% + lactated ringers with potassium chloride 20 mEq/L 1000 milliLiter(s) (125 mL/Hr) IV Continuous <Continuous>  folic acid 1 milliGRAM(s) Oral daily  heparin   Injectable 5000 Unit(s) SubCutaneous every 12 hours  pantoprazole  Injectable 40 milliGRAM(s) IV Push every 12 hours  prenatal multivitamin 1 Tablet(s) Oral daily  sodium chloride 0.9% 1000 milliLiter(s) (125 mL/Hr) IV Continuous <Continuous>    MEDICATIONS  (PRN):

## 2023-08-16 NOTE — PROGRESS NOTE ADULT - ATTENDING COMMENTS
Pt seen and evaluated at bedside  Agree with above  NG tube attempted, pt reports she had a panic attack and no longer wants to attempt.  Has milk this morning and was able to tolerate   Plan for EGD with motility study on Friday  Appreciate GI and nutrition input  Fetal status reassuring     peggy TANNER

## 2023-08-16 NOTE — PROGRESS NOTE ADULT - ASSESSMENT
39 y/o  33w3d presents with intolerance of po. She states she has had worsening dysphagia since the end of  when she was sick with covid. GI consulted for dysphagia.    #Dysphagia. New since the end of . Patient states it is to solids and liquids. Ddx includes reflux esophagitis, esophageal stricture, esophageal dysmotility  - Xray esophagram without evidence of esophageal dysmotility or mechanical obstructio  #Elevated transaminases: no known hx of liver disease. Noted to be uptrending since  in hepatocellular pattern. Denies use of OTC or herbal medications. Patient without HTN, thrombocytopenia, only trace protein in urine.   - downtrending but w/u so far unrevealing  - RUQ US unremarkable  - viral w/u w/out evidence of infection    Recommendations  - will plan for both EGD and endoflip on . Patient will need fetal monitoring during the procedure.  - recommend placement of NGT and initiation of TFs prior to then, however patient currently declining   - continue protonix 40 mg IV BID  - f/u IMER, anti-smooth muscle antibody, immunoglobulin panel  - consider hepatology consultation if transaminases persistently elevated     GI will continue to follow.     All recommendations are tentative until note is attested by attending.     [place] Tong, PGY5  Gastroenterology/Hepatology Fellow  Available on [employer] Teams  [phone] ([facility] Short Range Pager)  [phone] (Long Range Pager)    After 5pm, please contact the on-call GI fellow. [phone]

## 2023-08-16 NOTE — PROGRESS NOTE ADULT - ASSESSMENT
39 y/o  33w5d pregnancy c/b IUGR3% UAD normal () p/w 2 days of inability to tolerate po. Ever since she contracted Covid 1.5 months ago patient has had progressive difficulty swallowing food. Pt reports a pureed diet for the past two weeks. Pt admitted for dysphagia workup. Continues to have poor PO intake with maternal weight loss and restricted fetal growth.       #Dysphagia  - CMP this morning. Replete electrolytes as indicated. Potassium replete with 10mEG x3   - Recommended and strongly encouraged tube feeds to ensure caloric intake for patient. Pt declining at this time.     Discussed with patient the importance of calories, especially in the third trimester of pregnancy.     Pt approved to eat a regular diet - will attempt breakfast this AM.   - GI following, appreciate recs. Protonix 40mg IV, if no improvement with consider a EGD barium esophagram.   - Recommend EGD with motility study - to be preformed Friday.   - Barium esophagram (); Able to swallow contrast and barium pill without difficulty. No stricture or mass effect noted.   - Viral and auto immune labs without identified cause    - ENT following, preformed flexibles endoscope. No acute intervention. F/U outpatient   - SW saw patient. Appreciate recommendations  - Daily weights.   - Nutrition following.   - TPN consult(). Recommend tube feeds over TPN     #Transaminitis/elevated lipase  - AST/ALT: 111/123 -> 74/85->54/78->55/69  - Lipase 94  - RUQ sono (): WNL.      # IUGR - severe   - ATU (8/15): Breech, anterior, LIDIA 10.8, 1787g (3%, AC 2%, HC 1%) UAD wnl   - Repeat growth 3-4 weeks from prior    #Fetal wellbeing  - NST BID  - BPP 2x/wk  - PNV    #Maternal wellbeing  - HSQ for DVT ppx    COOPER Ford, pgy-3

## 2023-08-16 NOTE — PROGRESS NOTE ADULT - SUBJECTIVE AND OBJECTIVE BOX
Gastroenterology/Hepatology Progress Note    Interval Events: Patient declined NGT placement yesterday. Still eating very little, endorsing continued dysphagia.     Allergies:  No Known Drug Allergies  shellfish (Hives)  Beef (Unknown)    Hospital Medications:  dextrose 5% + lactated ringers with potassium chloride 20 mEq/L 1000 milliLiter(s) IV Continuous <Continuous>  folic acid 1 milliGRAM(s) Oral daily  heparin   Injectable 5000 Unit(s) SubCutaneous every 12 hours  pantoprazole  Injectable 40 milliGRAM(s) IV Push every 12 hours  potassium chloride  10 mEq/100 mL IVPB 10 milliEquivalent(s) IV Intermittent every 1 hour  sodium chloride 0.9% 1000 milliLiter(s) IV Continuous <Continuous>    ROS: 14 point ROS negative unless otherwise state in subjective    PHYSICAL EXAM:   Vital Signs:  Vital Signs Last 24 Hrs  T(C): 36.7 (16 Aug 2023 09:38), Max: 37.1 (15 Aug 2023 21:49)  T(F): 98 (16 Aug 2023 09:38), Max: 98.7 (15 Aug 2023 21:49)  HR: 82 (16 Aug 2023 09:38) (62 - 92)  BP: 100/65 (16 Aug 2023 09:38) (99/65 - 125/60)  BP(mean): --  RR: 17 (16 Aug 2023 09:38) (17 - 18)  SpO2: 100% (16 Aug 2023 09:38) (99% - 100%)    Parameters below as of 16 Aug 2023 09:38  Patient On (Oxygen Delivery Method): room air      Daily     Daily Weight in k.2 (16 Aug 2023 09:38)    GENERAL:  No acute distress  HEENT:  NCAT, no scleral icterus  CHEST: no resp distress  HEART:  RRR  ABDOMEN:  Soft, non-tender, non-distended. gravid uterus  EXTREMITIES:  No cyanosis, clubbing, or edema  SKIN:  No rash/erythema/ecchymoses    NEURO:  Alert and oriented x 3     LABS:                        8.5    4.60  )-----------( 142      ( 16 Aug 2023 05:30 )             27.1     Mean Cell Volume: 85.5 fL (-23 @ 05:30)    -    140  |  104  |  <2<L>  ----------------------------<  97  3.1<L>   |  23  |  0.54    Ca    8.6      16 Aug 2023 05:30  Phos  3.1       Mg     1.60         TPro  5.8<L>  /  Alb  2.8<L>  /  TBili  0.5  /  DBili  x   /  AST  55<H>  /  ALT  69<H>  /  AlkPhos  133<H>      LIVER FUNCTIONS - ( 16 Aug 2023 05:30 )  Alb: 2.8 g/dL / Pro: 5.8 g/dL / ALK PHOS: 133 U/L / ALT: 69 U/L / AST: 55 U/L / GGT: x             Urinalysis Basic - ( 16 Aug 2023 05:30 )    Color: x / Appearance: x / SG: x / pH: x  Gluc: 97 mg/dL / Ketone: x  / Bili: x / Urobili: x   Blood: x / Protein: x / Nitrite: x   Leuk Esterase: x / RBC: x / WBC x   Sq Epi: x / Non Sq Epi: x / Bacteria: x            Imaging:           Gastroenterology/Hepatology Progress Note    Interval Events: Patient declined NGT placement yesterday. Still eating very little, endorsing continued dysphagia.     Allergies:  No Known Drug Allergies  shellfish (Hives)  Beef (Unknown)    Hospital Medications:  dextrose 5% + lactated ringers with potassium chloride 20 mEq/L 1000 milliLiter(s) IV Continuous <Continuous>  folic acid 1 milliGRAM(s) Oral daily  heparin   Injectable 5000 Unit(s) SubCutaneous every 12 hours  pantoprazole  Injectable 40 milliGRAM(s) IV Push every 12 hours  potassium chloride  10 mEq/100 mL IVPB 10 milliEquivalent(s) IV Intermittent every 1 hour  sodium chloride 0.9% 1000 milliLiter(s) IV Continuous <Continuous>    ROS: 14 point ROS negative unless otherwise state in subjective    PHYSICAL EXAM:   Vital Signs:  Vital Signs Last 24 Hrs  T(C): 36.7 (16 Aug 2023 09:38), Max: 37.1 (15 Aug 2023 21:49)  T(F): 98 (16 Aug 2023 09:38), Max: 98.7 (15 Aug 2023 21:49)  HR: 82 (16 Aug 2023 09:38) (62 - 92)  BP: 100/65 (16 Aug 2023 09:38) (99/65 - 125/60)  BP(mean): --  RR: 17 (16 Aug 2023 09:38) (17 - 18)  SpO2: 100% (16 Aug 2023 09:38) (99% - 100%)    Parameters below as of 16 Aug 2023 09:38  Patient On (Oxygen Delivery Method): room air      Daily     Daily Weight in k.2 (16 Aug 2023 09:38)    GENERAL:  No acute distress  HEENT:  NCAT, no scleral icterus  CHEST: no resp distress  HEART:  RRR  ABDOMEN:  Soft, non-tender, non-distended. gravid uterus  EXTREMITIES:  No cyanosis, clubbing, or edema  SKIN:  No rash/erythema/ecchymoses    NEURO:  Alert and oriented x 3     LABS:                        8.5    4.60  )-----------( 142      ( 16 Aug 2023 05:30 )             27.1     Mean Cell Volume: 85.5 fL (-23 @ 05:30)    -    140  |  104  |  <2<L>  ----------------------------<  97  3.1<L>   |  23  |  0.54    Ca    8.6      16 Aug 2023 05:30  Phos  3.1       Mg     1.60         TPro  5.8<L>  /  Alb  2.8<L>  /  TBili  0.5  /  DBili  x   /  AST  55<H>  /  ALT  69<H>  /  AlkPhos  133<H>      LIVER FUNCTIONS - ( 16 Aug 2023 05:30 )  Alb: 2.8 g/dL / Pro: 5.8 g/dL / ALK PHOS: 133 U/L / ALT: 69 U/L / AST: 55 U/L / GGT: x             Urinalysis Basic - ( 16 Aug 2023 05:30 )    Color: x / Appearance: x / SG: x / pH: x  Gluc: 97 mg/dL / Ketone: x  / Bili: x / Urobili: x   Blood: x / Protein: x / Nitrite: x   Leuk Esterase: x / RBC: x / WBC x   Sq Epi: x / Non Sq Epi: x / Bacteria: x

## 2023-08-16 NOTE — PROGRESS NOTE ADULT - ATTENDING COMMENTS
Reports eating a small amount this morning (tolerated it well), but recently had a little salmon for lunch and feels sensation of "tightness" and that it may be stuck. Otherwise, appears comfortable, tolerating secretions.    # Dysphagia, globus sensation: Suspect largely functional component, but possibly underlying stricture vs dysmotility  # Poor PO intake  # Chest, throat and head "tightness" with reports of subjective difficulty with inspiration: Etiology unclear. Suspect possible anxiety component (as noted by partner/family at bedside).   # Elevated liver tests    --Correction of K per primary team. Please ensure electrolytes are adequately corrected prior to Friday; if hypokalemic, procedure will be canceled  --Tentatively plan for EGD +/- EndoFLIP on Friday 8/18 if medically optimized. Will need fetal monitoring arranged in feryn-procedural period.   --Patient declining NGT. Suggest PO diet as tolerated. Ultimately may need to consider TPN if no pathology on EGD/EndoFLIP and food avoidance continues   --Continue with high dose PPI trial  --Trend liver tests (now downtrending), if rising and/or associated with worsening thrombocytopenia, suggest hepatology consultation    Additional recommendations as above.

## 2023-08-17 ENCOUNTER — APPOINTMENT (OUTPATIENT)
Dept: PULMONOLOGY | Facility: CLINIC | Age: 38
End: 2023-08-17

## 2023-08-17 ENCOUNTER — ASOB RESULT (OUTPATIENT)
Age: 38
End: 2023-08-17

## 2023-08-17 ENCOUNTER — APPOINTMENT (OUTPATIENT)
Dept: ANTEPARTUM | Facility: CLINIC | Age: 38
End: 2023-08-17
Payer: COMMERCIAL

## 2023-08-17 DIAGNOSIS — O36.5990 MATERNAL CARE FOR OTHER KNOWN OR SUSPECTED POOR FETAL GROWTH, UNSPECIFIED TRIMESTER, NOT APPLICABLE OR UNSPECIFIED: ICD-10-CM

## 2023-08-17 LAB
ALBUMIN SERPL ELPH-MCNC: 2.9 G/DL — LOW (ref 3.3–5)
ALP SERPL-CCNC: 130 U/L — HIGH (ref 40–120)
ALT FLD-CCNC: 62 U/L — HIGH (ref 4–33)
ANION GAP SERPL CALC-SCNC: 14 MMOL/L — SIGNIFICANT CHANGE UP (ref 7–14)
AST SERPL-CCNC: 52 U/L — HIGH (ref 4–32)
BILIRUB SERPL-MCNC: 0.5 MG/DL — SIGNIFICANT CHANGE UP (ref 0.2–1.2)
BUN SERPL-MCNC: 2 MG/DL — LOW (ref 7–23)
CALCIUM SERPL-MCNC: 8.9 MG/DL — SIGNIFICANT CHANGE UP (ref 8.4–10.5)
CHLORIDE SERPL-SCNC: 101 MMOL/L — SIGNIFICANT CHANGE UP (ref 98–107)
CO2 SERPL-SCNC: 24 MMOL/L — SIGNIFICANT CHANGE UP (ref 22–31)
CREAT SERPL-MCNC: 0.53 MG/DL — SIGNIFICANT CHANGE UP (ref 0.5–1.3)
EGFR: 123 ML/MIN/1.73M2 — SIGNIFICANT CHANGE UP
GLUCOSE SERPL-MCNC: 78 MG/DL — SIGNIFICANT CHANGE UP (ref 70–99)
HCT VFR BLD CALC: 27.9 % — LOW (ref 34.5–45)
HGB BLD-MCNC: 8.9 G/DL — LOW (ref 11.5–15.5)
MAGNESIUM SERPL-MCNC: 1.6 MG/DL — SIGNIFICANT CHANGE UP (ref 1.6–2.6)
MCHC RBC-ENTMCNC: 27 PG — SIGNIFICANT CHANGE UP (ref 27–34)
MCHC RBC-ENTMCNC: 31.9 GM/DL — LOW (ref 32–36)
MCV RBC AUTO: 84.5 FL — SIGNIFICANT CHANGE UP (ref 80–100)
NRBC # BLD: 0 /100 WBCS — SIGNIFICANT CHANGE UP (ref 0–0)
NRBC # FLD: 0 K/UL — SIGNIFICANT CHANGE UP (ref 0–0)
PHOSPHATE SERPL-MCNC: 4 MG/DL — SIGNIFICANT CHANGE UP (ref 2.5–4.5)
PLATELET # BLD AUTO: 135 K/UL — LOW (ref 150–400)
POTASSIUM SERPL-MCNC: 3.2 MMOL/L — LOW (ref 3.5–5.3)
POTASSIUM SERPL-SCNC: 3.2 MMOL/L — LOW (ref 3.5–5.3)
PROT SERPL-MCNC: 5.9 G/DL — LOW (ref 6–8.3)
RBC # BLD: 3.3 M/UL — LOW (ref 3.8–5.2)
RBC # FLD: 15.3 % — HIGH (ref 10.3–14.5)
SODIUM SERPL-SCNC: 139 MMOL/L — SIGNIFICANT CHANGE UP (ref 135–145)
WBC # BLD: 5.37 K/UL — SIGNIFICANT CHANGE UP (ref 3.8–10.5)
WBC # FLD AUTO: 5.37 K/UL — SIGNIFICANT CHANGE UP (ref 3.8–10.5)

## 2023-08-17 PROCEDURE — 90792 PSYCH DIAG EVAL W/MED SRVCS: CPT

## 2023-08-17 PROCEDURE — 76819 FETAL BIOPHYS PROFIL W/O NST: CPT | Mod: 26

## 2023-08-17 PROCEDURE — 99223 1ST HOSP IP/OBS HIGH 75: CPT

## 2023-08-17 PROCEDURE — 99233 SBSQ HOSP IP/OBS HIGH 50: CPT

## 2023-08-17 PROCEDURE — 76820 UMBILICAL ARTERY ECHO: CPT | Mod: 26

## 2023-08-17 PROCEDURE — 99232 SBSQ HOSP IP/OBS MODERATE 35: CPT | Mod: GC

## 2023-08-17 RX ORDER — POTASSIUM CHLORIDE 20 MEQ
10 PACKET (EA) ORAL
Refills: 0 | Status: COMPLETED | OUTPATIENT
Start: 2023-08-17 | End: 2023-08-17

## 2023-08-17 RX ORDER — FAMOTIDINE 10 MG/ML
20 INJECTION INTRAVENOUS ONCE
Refills: 0 | Status: COMPLETED | OUTPATIENT
Start: 2023-08-17 | End: 2023-08-17

## 2023-08-17 RX ADMIN — DEXTROSE MONOHYDRATE, SODIUM CHLORIDE, AND POTASSIUM CHLORIDE 125 MILLILITER(S): 50; .745; 4.5 INJECTION, SOLUTION INTRAVENOUS at 05:44

## 2023-08-17 RX ADMIN — PANTOPRAZOLE SODIUM 40 MILLIGRAM(S): 20 TABLET, DELAYED RELEASE ORAL at 05:44

## 2023-08-17 RX ADMIN — PANTOPRAZOLE SODIUM 40 MILLIGRAM(S): 20 TABLET, DELAYED RELEASE ORAL at 18:49

## 2023-08-17 RX ADMIN — Medication 100 MILLIEQUIVALENT(S): at 18:45

## 2023-08-17 RX ADMIN — Medication 100 MILLIEQUIVALENT(S): at 14:08

## 2023-08-17 RX ADMIN — SODIUM CHLORIDE 125 MILLILITER(S): 9 INJECTION, SOLUTION INTRAVENOUS at 13:55

## 2023-08-17 RX ADMIN — Medication 100 MILLIEQUIVALENT(S): at 17:27

## 2023-08-17 RX ADMIN — HEPARIN SODIUM 5000 UNIT(S): 5000 INJECTION INTRAVENOUS; SUBCUTANEOUS at 09:20

## 2023-08-17 NOTE — BH CONSULTATION LIAISON ASSESSMENT NOTE - SUMMARY
37 y/o [place] female, , domiciled with family with no prior psychiatric h/o, currently 33w6d pregnant,  c/b IUGR3% UAD normal (7/25) p/w 2 days of inability to tolerate PO. Ever since she contracted Covid 1.5 months ago patient has had progressive difficulty swallowing food. Pt reports a pureed diet for the past two weeks. Pt admitted for dysphagia workup. Continues to have poor PO intake with maternal weight loss and restricted fetal growth. Planned EGD/ motility study today. 39 y/o [place] female, , domiciled with family with no prior psychiatric h/o, currently 33w6d pregnant,  c/b IUGR3% UAD normal (7/25) p/w 2 days of inability to tolerate PO. Ever since she contracted Covid 1.5 months ago patient has had progressive difficulty swallowing food. Pt admitted for dysphagia workup. Continues to have poor PO intake with maternal weight loss and restricted fetal growth. Planned EGD/ motility study tomorrow.  Psychiatry was consulted for anxiety.  Patient on evaluation is calm, has a euthymic affect, anxious about feeding tube placement and refusing currently. She is in agreement to undergo EGD tomorrow. Otherwise denying feeling acutely depressed/ anxious. Denying psychotic symptoms including AH, VH, paranoia. Risk assessment neg for SI/HI. She refused to try an anti-anxiety medication at this time. Psychoeducation provided.     Plan:  Routine observation  For anxiety, can use Benadryl 25 mg PO Q8 PRN  No standing psychotropics at this time  Continue to f/u with GI recs, f/u EGD  Rest of the management per primary team

## 2023-08-17 NOTE — PROGRESS NOTE ADULT - SUBJECTIVE AND OBJECTIVE BOX
R3 Antepartum Note, HD#6    Patient seen and examined at bedside. Attempt to place NG tube for tube feeds yesterday afternoon after continued poor PO intake. Pt agreeable during discussion, but after lidocaine was administered pt experienced acute anxiety and refused placement. Declining an attempt this morning. Reports small bites of salmon and mashed potatoes overnight without cough. Continues to reports tightening sensation in throat. Pt reports +FM, denies LOF, VB, ctx, HA, epigastric pain, blurred vision, CP, SOB, N/V, fevers, and chills.      Vital Signs Last 24 Hours  T(C): 36.8 (08-17-23 @ 05:33), Max: 36.8 (08-17-23 @ 05:33)  HR: 70 (08-17-23 @ 05:33) (70 - 90)  BP: 108/58 (08-17-23 @ 05:33) (100/65 - 108/65)  RR: 17 (08-17-23 @ 05:33) (17 - 17)  SpO2: 98% (08-17-23 @ 05:33) (98% - 100%)    CAPILLARY BLOOD GLUCOSE      Physical Exam:  General: NAD  Abdomen: Soft, non-tender, gravid  Ext: No pain or swelling    NST reactive overnight    Labs:             8.5    4.60  )-----------( 142      ( 08-16 @ 05:30 )             27.1     08-16 @ 05:30    140  |  104  |  <2  ----------------------------<  97  3.1   |  23  |  0.54    Ca    8.6      08-16 @ 05:30  Phos  3.1     08-16 @ 05:30  Mg     1.60     08-16 @ 05:30    TPro  5.8  /  Alb  2.8  /  TBili  0.5  /  DBili  x   /  AST  55  /  ALT  69  /  AlkPhos  133  08-16 @ 05:30    PT/INR - ( 08-14 @ 06:21 )   PT: 11.5 sec;   INR: 1.03 ratio    PTT - ( 08-14 @ 06:21 )  PTT:29.9 sec        MEDICATIONS  (STANDING):  dextrose 5% + lactated ringers with potassium chloride 20 mEq/L 1000 milliLiter(s) (125 mL/Hr) IV Continuous <Continuous>  folic acid 1 milliGRAM(s) Oral daily  heparin   Injectable 5000 Unit(s) SubCutaneous every 12 hours  pantoprazole  Injectable 40 milliGRAM(s) IV Push every 12 hours  sodium chloride 0.9% 1000 milliLiter(s) (125 mL/Hr) IV Continuous <Continuous>    MEDICATIONS  (PRN):   R3 Antepartum Note, HD#7    Patient seen and examined at bedside. Pt reports tolerating regular diet yesterday. Ate 2 small bowls of soups, several bites of puddings, and drinking her ensure. Reports a burning sensation in her throat during this time, but denies cough, nausea, or emesis. Pt continues to decline a NG tube for feeds at this time. Reports an increase in her LE swelling attributed to IVF which pt is requesting to turn off.  Pt reports +FM, denies LOF, VB, ctx, HA, epigastric pain, blurred vision, CP, SOB, N/V, fevers, and chills.      Vital Signs Last 24 Hours  T(C): 36.8 (08-17-23 @ 05:33), Max: 36.8 (08-17-23 @ 05:33)  HR: 70 (08-17-23 @ 05:33) (70 - 90)  BP: 108/58 (08-17-23 @ 05:33) (100/65 - 108/65)  RR: 17 (08-17-23 @ 05:33) (17 - 17)  SpO2: 98% (08-17-23 @ 05:33) (98% - 100%)    CAPILLARY BLOOD GLUCOSE      Physical Exam:  General: NAD  Abdomen: Soft, non-tender, gravid  Ext: Ankle and calf noted to be more swollen than her baseline, but non pitting or erythematous. Tenderness elicited on the dorsal aspect of the foot at the level of the swelling. Non tender calfs. Negative thurman's sign.     NST reactive overnight    Labs:             8.5    4.60  )-----------( 142      ( 08-16 @ 05:30 )             27.1     08-16 @ 05:30    140  |  104  |  <2  ----------------------------<  97  3.1   |  23  |  0.54    Ca    8.6      08-16 @ 05:30  Phos  3.1     08-16 @ 05:30  Mg     1.60     08-16 @ 05:30    TPro  5.8  /  Alb  2.8  /  TBili  0.5  /  DBili  x   /  AST  55  /  ALT  69  /  AlkPhos  133  08-16 @ 05:30    PT/INR - ( 08-14 @ 06:21 )   PT: 11.5 sec;   INR: 1.03 ratio    PTT - ( 08-14 @ 06:21 )  PTT:29.9 sec        MEDICATIONS  (STANDING):  dextrose 5% + lactated ringers with potassium chloride 20 mEq/L 1000 milliLiter(s) (125 mL/Hr) IV Continuous <Continuous>  folic acid 1 milliGRAM(s) Oral daily  heparin   Injectable 5000 Unit(s) SubCutaneous every 12 hours  pantoprazole  Injectable 40 milliGRAM(s) IV Push every 12 hours  sodium chloride 0.9% 1000 milliLiter(s) (125 mL/Hr) IV Continuous <Continuous>    MEDICATIONS  (PRN):

## 2023-08-17 NOTE — PROGRESS NOTE ADULT - ATTENDING COMMENTS
Patient reports ongoing globus sensation. Remains amenable to EGD +/- EndoFLIP tomorrow. Continue PPI. Please ensure electrolytes corrected to avoid delay/cancelation; suggest rechecking this evening and early in AM to allow time for correction if needed. Please do not correct with powder in AM as this may lead to delays. Discussed case briefly with SW on floor; patient had expressed concomitant anxiety, which I suspect is likely playing large role in exacerbating ongoing symptoms. Would consider BH evaluation, either today vs post-endoscopic findings. Additional recommendations as above.

## 2023-08-17 NOTE — BH CONSULTATION LIAISON ASSESSMENT NOTE - NSBHATTESTCOMMENTATTENDFT_PSY_A_CORE
Chart reviewed.  Patient seen this afternoon. Her main concern is her swallowing, talks about being able to get some food down. Denies psychiatric symptoms. Denies SI/HI. Denies AH/VH. Does not want medication to help with anxiety related to NGT. Affect is anxious but euthymic. Thought process is linear.  Insight is likely impaired.    It is quite possible that patient has underlying anxiety- could be related to current pregnancy with history of MAB and SAB. It is possible that these physical symptoms are a manifestation of her anxiety.  For now, she is open to EGD and seems motivated to try and eat.  She does not want psychiatric medication at this time.  Call with questions.

## 2023-08-17 NOTE — BH CONSULTATION LIAISON ASSESSMENT NOTE - CURRENT MEDICATION
MEDICATIONS  (STANDING):  dextrose 5% + lactated ringers with potassium chloride 20 mEq/L 1000 milliLiter(s) (125 mL/Hr) IV Continuous <Continuous>  folic acid 1 milliGRAM(s) Oral daily  heparin   Injectable 5000 Unit(s) SubCutaneous every 12 hours  pantoprazole  Injectable 40 milliGRAM(s) IV Push every 12 hours  potassium chloride  10 mEq/100 mL IVPB 10 milliEquivalent(s) IV Intermittent every 1 hour  sodium chloride 0.9% 1000 milliLiter(s) (125 mL/Hr) IV Continuous <Continuous>    MEDICATIONS  (PRN):

## 2023-08-17 NOTE — BH CONSULTATION LIAISON ASSESSMENT NOTE - NSBHCHARTREVIEWVS_PSY_A_CORE FT
Vital Signs Last 24 Hrs  T(C): 36.8 (17 Aug 2023 14:16), Max: 36.8 (17 Aug 2023 05:33)  T(F): 98.2 (17 Aug 2023 14:16), Max: 98.3 (17 Aug 2023 05:33)  HR: 88 (17 Aug 2023 14:16) (70 - 88)  BP: 109/65 (17 Aug 2023 14:16) (104/62 - 109/65)  BP(mean): --  RR: 17 (17 Aug 2023 14:16) (17 - 17)  SpO2: 100% (17 Aug 2023 14:16) (98% - 100%)    Parameters below as of 17 Aug 2023 14:16  Patient On (Oxygen Delivery Method): room air

## 2023-08-17 NOTE — PROGRESS NOTE ADULT - ATTENDING COMMENTS
ATT:  Pt seen and examined by me in the evening of 8/17/23. . I agree with findings, assessment and plan documented in resident note. Discussed with pt need to provide nutrition for fetus with growth restriction. She states she wants to eat but her throat is "too tight". No improvement of po intake since admission. Continuing GI peralta, for endoscopy tomorrow. Charlene Mcgarry MD

## 2023-08-17 NOTE — PROGRESS NOTE ADULT - ASSESSMENT
37 y/o  33w6d pregnancy c/b IUGR3% UAD normal () p/w 2 days of inability to tolerate po. Ever since she contracted Covid 1.5 months ago patient has had progressive difficulty swallowing food. Pt reports a pureed diet for the past two weeks. Pt admitted for dysphagia workup. Continues to have poor PO intake with maternal weight loss and restricted fetal growth. Tolerating a regular diet yesterday.     #Dysphagia  - CMP this morning. Replete electrolytes as indicated. Potassium replete with 10mEG x3   - Recommended and strongly encouraged tube feeds to ensure caloric intake for patient. Pt declining at this time.     Discussed with patient the importance of calories, especially in the third trimester of pregnancy.     Pt approved to eat a regular diet - will attempt breakfast this AM.   - GI following, appreciate recs. Protonix 40mg IV, if no improvement with consider a EGD barium esophagram.   - Recommend EGD with motility study - to be preformed Friday.   - Barium esophagram (); Able to swallow contrast and barium pill without difficulty. No stricture or mass effect noted.   - Viral and auto immune labs without identified cause    - ENT following, preformed flexibles endoscope. No acute intervention. F/U outpatient   - SW saw patient. Appreciate recommendations  - Daily weights.   - Nutrition following.   - TPN consult(). Recommend tube feeds over TPN     #Transaminitis/elevated lipase  - AST/ALT: 111/123 -> 74/85->54/78->55/69  - Lipase 94  - RUQ sono (): WNL.      # IUGR - severe   - ATU (8/15): Breech, anterior, LIDIA 10.8, 1787g (3%, AC 2%, HC 1%) UAD wnl   - Repeat growth 3-4 weeks from prior    #Fetal wellbeing  - NST BID  - BPP 2x/wk  - PNV    #Maternal wellbeing  - HSQ for DVT ppx    COOPER Ford, pgy-3 39 y/o  33w6d pregnancy c/b IUGR3% UAD normal () p/w 2 days of inability to tolerate po. Ever since she contracted Covid 1.5 months ago patient has had progressive difficulty swallowing food. Pt reports a pureed diet for the past two weeks. Pt admitted for dysphagia workup. Continues to have poor PO intake with maternal weight loss and restricted fetal growth. Tolerating a regular diet today.     #Dysphagia  - CMP this AM. Replete electrolytes as indicated. K >3 for planned EGD tomorrow.   - Recommended and strongly encouraged tube feeds to ensure caloric intake for patient. Pt declining at this time. Nutrition following.   - GI following, appreciate recs. Protonix 40mg IV, if no improvement with consider a EGD barium esophagram.   - Recommend EGD with motility study - to be preformed Friday. Pt aware.  NPO at midnight.    - Barium esophagram (); Able to swallow contrast and barium pill without difficulty. No stricture or mass effect noted.   - Viral and auto immune labs without identified cause    - ENT following, preformed flexibles endoscope. No acute intervention. F/U outpatient   - SW saw patient. Recommending psychiatric evaluation.   - Daily weights.   - Nutrition following.   - TPN consult(). Recommend tube feeds over TPN     #Transaminitis/elevated lipase  - AST/ALT: 111/123 -> 74/85->54/78->55/69  - Lipase 94  - RUQ sono (): WNL.      # IUGR - severe   - ATU (8/15): Breech, anterior, LIDIA 10.8, 1787g (3%, AC 2%, HC 1%) UAD wnl   - Repeat growth 3-4 weeks from prior    #Fetal wellbeing  - NST BID  - BPP 2x/wk  - PNV    #Maternal wellbeing  - HSQ for DVT ppx    COOPER Ford, pgy-3 39 y/o  33w6d pregnancy c/b IUGR3% UAD normal () p/w 2 days of inability to tolerate po. Ever since she contracted Covid 1.5 months ago patient has had progressive difficulty swallowing food. Pt reports a pureed diet for the past two weeks. Pt admitted for dysphagia workup. Continues to have poor PO intake with maternal weight loss and restricted fetal growth. Tolerating a regular diet today. Pt for psychiatric evaluation today. Planned EGD/ motility study today.    #Dysphagia  - CMP this AM. Replete electrolytes as indicated. K >3 for planned EGD tomorrow.   - Recommended and strongly encouraged tube feeds to ensure caloric intake for patient. Pt declining at this time. Nutrition following.   - GI following, appreciate recs. Protonix 40mg IV, if no improvement with consider a EGD barium esophagram.   - Recommend EGD with motility study - to be preformed Friday. Pt aware.  NPO at midnight.    - Barium esophagram (); Able to swallow contrast and barium pill without difficulty. No stricture or mass effect noted.   - Viral and auto immune labs without identified cause    - ENT following, preformed flexibles endoscope. No acute intervention. F/U outpatient   - SW saw patient. Recommending psychiatric evaluation.  - Psychiatric consult placed. Appreciate recommendations.    - Daily weights.   - Nutrition following.   - TPN consult(). Recommend tube feeds over TPN     #Transaminitis/elevated lipase  - AST/ALT: 111/123 -> 74/85->54/78->55/69->52/62  - Lipase 94  - RUQ sono (): WNL.      # IUGR - severe   - ATU (8/15): Breech, anterior, LIDIA 10.8, 1787g (3%, AC 2%, HC 1%) UAD wnl   - Repeat growth 3-4 weeks from prior    #Fetal wellbeing  - NST BID  - BPP 2x/wk  - PNV    #Maternal wellbeing  - HSQ for DVT ppx  - NPO tonight for anticipated EGD tomorrow.     COOPER Ford, pgy-3

## 2023-08-17 NOTE — BH CONSULTATION LIAISON ASSESSMENT NOTE - NSBHCHARTREVIEWLAB_PSY_A_CORE FT
8.9    5.37  )-----------( 135      ( 17 Aug 2023 05:30 )             27.9   08-17    139  |  101  |  2<L>  ----------------------------<  78  3.2<L>   |  24  |  0.53    Ca    8.9      17 Aug 2023 05:30  Phos  4.0     08-17  Mg     1.60     08-17    TPro  5.9<L>  /  Alb  2.9<L>  /  TBili  0.5  /  DBili  x   /  AST  52<H>  /  ALT  62<H>  /  AlkPhos  130<H>  08-17

## 2023-08-17 NOTE — PROGRESS NOTE ADULT - REASON FOR ADMISSION
limited PO intake, dysphagia
dysphagia, poor PO
poor PO intake
dysphagia, poor PO intake
poor PO intake

## 2023-08-17 NOTE — BH CONSULTATION LIAISON ASSESSMENT NOTE - HPI (INCLUDE ILLNESS QUALITY, SEVERITY, DURATION, TIMING, CONTEXT, MODIFYING FACTORS, ASSOCIATED SIGNS AND SYMPTOMS)
37 y/o [place] female, , domiciled with family with no prior psychiatric h/o, currently 33w6d pregnant,  c/b IUGR3% UAD normal (7/25) p/w 2 days of inability to tolerate PO. Ever since she contracted Covid 1.5 months ago patient has had progressive difficulty swallowing food. Pt reports a pureed diet for the past two weeks. Pt admitted for dysphagia workup. Continues to have poor PO intake with maternal weight loss and restricted fetal growth. Planned EGD/ motility study today.  Psychiatry consulted for "anxiety".  Patient seen and evaluated at the bedside. She was calm, trying to have her lunch saying that she has been having difficulty tolerating oral diet. States that from past 2 months, whenever she eats, she feels tightness in her throat with difficulty swallowing. Reported seeing an ENT specialist who just informed of seeing some redness in her throat with no other probable cause. Reports having lost 11 pounds since past 3 weeks as she is unable to eat. Patient was constantly pulling her her tongue forward while talking saying that it helps to relax her throat. She then asked the provider to come back later as she is having difficulty talking.   39 y/o Q[place] female, , domiciled with family with no prior psychiatric h/o, currently 33w6d pregnant,  c/b IUGR3% UAD normal (7/25) p/w 2 days of inability to tolerate PO. Ever since she contracted Covid 1.5 months ago patient has had progressive difficulty swallowing food. Pt reports a pureed diet for the past two weeks. Pt admitted for dysphagia workup. Continues to have poor PO intake with maternal weight loss and restricted fetal growth. Planned EGD/ motility study today.  Psychiatry consulted for "anxiety".  Patient seen and evaluated at the bedside. She was calm, trying to have her lunch saying that she has been having difficulty tolerating oral diet. States that from past 2 months, whenever she eats, she feels tightness in her throat with difficulty swallowing. Reported seeing an ENT specialist who just informed of seeing some redness in her throat with no other probable cause. Reports having lost 11 pounds since past 3 weeks as she is unable to eat. Patient was constantly pulling her tongue forward while talking saying that it helps to relax her throat. She then asked the provider to come back later as she is having difficulty talking.  Re evaluated her later with the Attending. She was calm, trying to have a walk in the room. She denied any acute feelings of anxiety or depression. Denied any prior psychiatric h/o. No h/o manic symptoms or psychosis. She reported getting "panicked" yesterday when they attempted feeding tube saying that it made her very anxious and she would not prefer to get it placed. She is however in agreement with getting EGD tomorrow to identify any underlying cause. Asked her if she needs any medication to control her anxiety but she refused, saying that she doesn't want any medications. Otherwise she is future oriented, denying any SI/HI/I/P.  37 y/o [place] female, , domiciled with family with no prior psychiatric h/o, currently 33w6d pregnant,  c/b IUGR3% UAD normal (7/25) p/w 2 days of inability to tolerate PO. Ever since she contracted Covid 1.5 months ago patient has had progressive difficulty swallowing food. Pt reports a pureed diet for the past two weeks. Pt admitted for dysphagia workup. Continues to have poor PO intake with maternal weight loss and restricted fetal growth. Planned EGD/ motility study tomorrow.  Psychiatry consulted for "anxiety".  Patient seen and evaluated at the bedside. She was calm, trying to have her lunch saying that she has been having difficulty tolerating oral diet. States that from past 2 months, whenever she eats, she feels tightness in her throat with difficulty swallowing. Reported seeing an ENT specialist who just informed of seeing some redness in her throat with no other probable cause. Reports having lost 11 pounds since past 3 weeks as she is unable to eat. Patient was constantly pulling her tongue forward while talking saying that it helps to relax her throat. She then asked the provider to come back later as she is having difficulty talking.  Re evaluated her later with the Attending. She was calm, trying to have a walk in the room. She denied any acute feelings of anxiety or depression. Denied any prior psychiatric h/o. No h/o manic symptoms or psychosis. She reported getting "panicked" yesterday when they attempted feeding tube saying that it made her very anxious and she would not prefer to get it placed. She is however in agreement with getting EGD tomorrow to identify any underlying cause. Asked her if she needs any medication to control her anxiety but she refused, saying that she doesn't want any medications. Otherwise she is future oriented, denying any SI/HI/I/P.  37 y/o Burmese female, , domiciled with family with no prior psychiatric h/o, currently 33w6d pregnant,  c/b IUGR3% UAD normal (7/25) p/w 2 days of inability to tolerate PO. Ever since she contracted Covid 1.5 months ago patient has had progressive difficulty swallowing food. Pt reports a pureed diet for the past two weeks. Pt admitted for dysphagia workup. Continues to have poor PO intake with maternal weight loss and restricted fetal growth. Planned EGD/ motility study tomorrow.  Psychiatry consulted for "anxiety".  Patient seen and evaluated at the bedside. She was calm, trying to have her lunch saying that she has been having difficulty tolerating oral diet. States that from past 2 months, whenever she eats, she feels tightness in her throat with difficulty swallowing. Reported seeing an ENT specialist who just informed of seeing some redness in her throat with no other probable cause. Reports having lost 11 pounds since past 3 weeks as she is unable to eat. Patient was constantly pulling her tongue forward while talking saying that it helps to relax her throat. She then asked the provider to come back later as she is having difficulty talking.    Re evaluated her later with the Attending. She was calm, trying to have a walk in the room. She denied any acute feelings of anxiety or depression. Denied any prior psychiatric h/o. No h/o manic symptoms or psychosis. She reported getting "panicked" yesterday when they attempted feeding tube saying that it made her very anxious and she would not prefer to get it placed. She is however in agreement with getting EGD tomorrow to identify any underlying cause. Asked her if she needs any medication to control her anxiety but she refused, saying that she doesn't want any medications. Otherwise she is future oriented, denying any SI/HI/I/P.

## 2023-08-17 NOTE — PROGRESS NOTE ADULT - ASSESSMENT
39 y/o  33w3d presents with intolerance of po. She states she has had worsening dysphagia since the end of  when she was sick with covid. GI consulted for dysphagia.    #Dysphagia. New since the end of . Patient states it is to solids and liquids. Ddx includes reflux esophagitis, esophageal stricture, esophageal dysmotility  - Xray esophagram without evidence of esophageal dysmotility or mechanical obstructio  #Elevated transaminases: no known hx of liver disease. Noted to be uptrending since  in hepatocellular pattern. Denies use of OTC or herbal medications. Patient without HTN, thrombocytopenia, only trace protein in urine.   - downtrending but w/u so far unrevealing  - RUQ US unremarkable  - viral w/u w/out evidence of infection, AIH w/u negative    Recommendations  - will plan for both EGD and endoflip tomorrow . Patient will need periprocedural fetal monitoring.   - please obtain 3 AM CBC/CMP and replete K as soon as possible so as not to delay procedure. Recommend obtaining CMP this evening in order to ensure adequate repletion  - please keep npo after MN  - recommend placement of NGT and initiation of TFs prior to procedure, however patient currently declining   - continue protonix 40 mg IV BID  - consider hepatology consultation if transaminases persistently elevated     GI will continue to follow.     All recommendations are tentative until note is attested by attending.     [place] Tong, PGY5  Gastroenterology/Hepatology Fellow  Available on Microsoft Teams  [phone] ([employer] Short Range Pager)  [phone] (Long Range Pager)    After 5pm, please contact the on-call GI fellow. [phone]

## 2023-08-17 NOTE — PROGRESS NOTE ADULT - SUBJECTIVE AND OBJECTIVE BOX
Gastroenterology/Hepatology Progress Note    Interval Events: Patient was able to eat a bit more yesterday. States that the protonix has been helping her GERD but dysphagia and globus sensation still there.    Allergies:  No Known Drug Allergies  shellfish (Hives)  Beef (Unknown)    Hospital Medications:  dextrose 5% + lactated ringers with potassium chloride 20 mEq/L 1000 milliLiter(s) IV Continuous <Continuous>  folic acid 1 milliGRAM(s) Oral daily  heparin   Injectable 5000 Unit(s) SubCutaneous every 12 hours  pantoprazole  Injectable 40 milliGRAM(s) IV Push every 12 hours  sodium chloride 0.9% 1000 milliLiter(s) IV Continuous <Continuous>      ROS: 14 point ROS negative unless otherwise state in subjective    PHYSICAL EXAM:   Vital Signs:  Vital Signs Last 24 Hrs  T(C): 36.8 (17 Aug 2023 05:33), Max: 36.8 (17 Aug 2023 05:33)  T(F): 98.3 (17 Aug 2023 05:33), Max: 98.3 (17 Aug 2023 05:33)  HR: 70 (17 Aug 2023 05:33) (70 - 90)  BP: 108/58 (17 Aug 2023 05:33) (100/65 - 108/65)  BP(mean): --  RR: 17 (17 Aug 2023 05:33) (17 - 17)  SpO2: 98% (17 Aug 2023 05:33) (98% - 100%)    Parameters below as of 17 Aug 2023 05:33  Patient On (Oxygen Delivery Method): room air    Daily     Daily Weight in k.2 (16 Aug 2023 09:38)    GENERAL:  No acute distress  HEENT:  NCAT, no scleral icterus  CHEST: no resp distress  HEART:  RRR  ABDOMEN:  Soft, non-tender, non-distended, gravid uterus  EXTREMITIES:  No cyanosis, clubbing, or edema  SKIN:  No rash/erythema/ecchymoses   NEURO:  Alert and oriented x 3     LABS:                        8.9    5.37  )-----------( 135      ( 17 Aug 2023 05:30 )             27.9     Mean Cell Volume: 84.5 fL (-23 @ 05:30)        139  |  101  |  2<L>  ----------------------------<  78  3.2<L>   |  24  |  0.53    Ca    8.9      17 Aug 2023 05:30  Phos  4.0       Mg     1.60         TPro  5.9<L>  /  Alb  2.9<L>  /  TBili  0.5  /  DBili  x   /  AST  52<H>  /  ALT  62<H>  /  AlkPhos  130<H>      LIVER FUNCTIONS - ( 17 Aug 2023 05:30 )  Alb: 2.9 g/dL / Pro: 5.9 g/dL / ALK PHOS: 130 U/L / ALT: 62 U/L / AST: 52 U/L / GGT: x             Urinalysis Basic - ( 17 Aug 2023 05:30 )    Color: x / Appearance: x / SG: x / pH: x  Gluc: 78 mg/dL / Ketone: x  / Bili: x / Urobili: x   Blood: x / Protein: x / Nitrite: x   Leuk Esterase: x / RBC: x / WBC x   Sq Epi: x / Non Sq Epi: x / Bacteria: x      Imaging:           Gastroenterology/Hepatology Progress Note    Interval Events: Patient was able to eat a bit more yesterday. States that the protonix has been helping her GERD but dysphagia and globus sensation still there.    Allergies:  No Known Drug Allergies  shellfish (Hives)  Beef (Unknown)    Hospital Medications:  dextrose 5% + lactated ringers with potassium chloride 20 mEq/L 1000 milliLiter(s) IV Continuous <Continuous>  folic acid 1 milliGRAM(s) Oral daily  heparin   Injectable 5000 Unit(s) SubCutaneous every 12 hours  pantoprazole  Injectable 40 milliGRAM(s) IV Push every 12 hours  sodium chloride 0.9% 1000 milliLiter(s) IV Continuous <Continuous>      ROS: 14 point ROS negative unless otherwise state in subjective    PHYSICAL EXAM:   Vital Signs:  Vital Signs Last 24 Hrs  T(C): 36.8 (17 Aug 2023 05:33), Max: 36.8 (17 Aug 2023 05:33)  T(F): 98.3 (17 Aug 2023 05:33), Max: 98.3 (17 Aug 2023 05:33)  HR: 70 (17 Aug 2023 05:33) (70 - 90)  BP: 108/58 (17 Aug 2023 05:33) (100/65 - 108/65)  BP(mean): --  RR: 17 (17 Aug 2023 05:33) (17 - 17)  SpO2: 98% (17 Aug 2023 05:33) (98% - 100%)    Parameters below as of 17 Aug 2023 05:33  Patient On (Oxygen Delivery Method): room air    Daily     Daily Weight in k.2 (16 Aug 2023 09:38)    GENERAL:  No acute distress  HEENT:  NCAT, no scleral icterus  CHEST: no resp distress  HEART:  RRR  ABDOMEN:  Soft, non-tender, non-distended, gravid uterus  EXTREMITIES:  No cyanosis, clubbing, or edema  SKIN:  No rash/erythema/ecchymoses   NEURO:  Alert and oriented x 3     LABS:                        8.9    5.37  )-----------( 135      ( 17 Aug 2023 05:30 )             27.9     Mean Cell Volume: 84.5 fL (-23 @ 05:30)        139  |  101  |  2<L>  ----------------------------<  78  3.2<L>   |  24  |  0.53    Ca    8.9      17 Aug 2023 05:30  Phos  4.0       Mg     1.60         TPro  5.9<L>  /  Alb  2.9<L>  /  TBili  0.5  /  DBili  x   /  AST  52<H>  /  ALT  62<H>  /  AlkPhos  130<H>      LIVER FUNCTIONS - ( 17 Aug 2023 05:30 )  Alb: 2.9 g/dL / Pro: 5.9 g/dL / ALK PHOS: 130 U/L / ALT: 62 U/L / AST: 52 U/L / GGT: x             Urinalysis Basic - ( 17 Aug 2023 05:30 )    Color: x / Appearance: x / SG: x / pH: x  Gluc: 78 mg/dL / Ketone: x  / Bili: x / Urobili: x   Blood: x / Protein: x / Nitrite: x   Leuk Esterase: x / RBC: x / WBC x   Sq Epi: x / Non Sq Epi: x / Bacteria: x

## 2023-08-18 ENCOUNTER — RESULT REVIEW (OUTPATIENT)
Age: 38
End: 2023-08-18

## 2023-08-18 LAB
ALBUMIN SERPL ELPH-MCNC: 3 G/DL — LOW (ref 3.3–5)
ALP SERPL-CCNC: 133 U/L — HIGH (ref 40–120)
ALT FLD-CCNC: 60 U/L — HIGH (ref 4–33)
ANION GAP SERPL CALC-SCNC: 12 MMOL/L — SIGNIFICANT CHANGE UP (ref 7–14)
AST SERPL-CCNC: 49 U/L — HIGH (ref 4–32)
BILIRUB SERPL-MCNC: 0.5 MG/DL — SIGNIFICANT CHANGE UP (ref 0.2–1.2)
BLD GP AB SCN SERPL QL: NEGATIVE — SIGNIFICANT CHANGE UP
BUN SERPL-MCNC: 2 MG/DL — LOW (ref 7–23)
CALCIUM SERPL-MCNC: 8.6 MG/DL — SIGNIFICANT CHANGE UP (ref 8.4–10.5)
CHLORIDE SERPL-SCNC: 103 MMOL/L — SIGNIFICANT CHANGE UP (ref 98–107)
CO2 SERPL-SCNC: 24 MMOL/L — SIGNIFICANT CHANGE UP (ref 22–31)
CREAT SERPL-MCNC: 0.54 MG/DL — SIGNIFICANT CHANGE UP (ref 0.5–1.3)
EGFR: 122 ML/MIN/1.73M2 — SIGNIFICANT CHANGE UP
GLUCOSE SERPL-MCNC: 84 MG/DL — SIGNIFICANT CHANGE UP (ref 70–99)
HCT VFR BLD CALC: 29.3 % — LOW (ref 34.5–45)
HGB BLD-MCNC: 9.3 G/DL — LOW (ref 11.5–15.5)
MCHC RBC-ENTMCNC: 27.2 PG — SIGNIFICANT CHANGE UP (ref 27–34)
MCHC RBC-ENTMCNC: 31.7 GM/DL — LOW (ref 32–36)
MCV RBC AUTO: 85.7 FL — SIGNIFICANT CHANGE UP (ref 80–100)
NRBC # BLD: 0 /100 WBCS — SIGNIFICANT CHANGE UP (ref 0–0)
NRBC # FLD: 0 K/UL — SIGNIFICANT CHANGE UP (ref 0–0)
PLATELET # BLD AUTO: 148 K/UL — LOW (ref 150–400)
POTASSIUM SERPL-MCNC: 3.5 MMOL/L — SIGNIFICANT CHANGE UP (ref 3.5–5.3)
POTASSIUM SERPL-SCNC: 3.5 MMOL/L — SIGNIFICANT CHANGE UP (ref 3.5–5.3)
PROT SERPL-MCNC: 6 G/DL — SIGNIFICANT CHANGE UP (ref 6–8.3)
RBC # BLD: 3.42 M/UL — LOW (ref 3.8–5.2)
RBC # FLD: 15.3 % — HIGH (ref 10.3–14.5)
RH IG SCN BLD-IMP: POSITIVE — SIGNIFICANT CHANGE UP
SODIUM SERPL-SCNC: 139 MMOL/L — SIGNIFICANT CHANGE UP (ref 135–145)
WBC # BLD: 5.74 K/UL — SIGNIFICANT CHANGE UP (ref 3.8–10.5)
WBC # FLD AUTO: 5.74 K/UL — SIGNIFICANT CHANGE UP (ref 3.8–10.5)

## 2023-08-18 PROCEDURE — 91040 ESOPH BALLOON DISTENSION TST: CPT | Mod: 26,GC

## 2023-08-18 PROCEDURE — 99233 SBSQ HOSP IP/OBS HIGH 50: CPT | Mod: GC

## 2023-08-18 PROCEDURE — 71045 X-RAY EXAM CHEST 1 VIEW: CPT | Mod: 26

## 2023-08-18 PROCEDURE — 43239 EGD BIOPSY SINGLE/MULTIPLE: CPT | Mod: GC

## 2023-08-18 PROCEDURE — 88305 TISSUE EXAM BY PATHOLOGIST: CPT | Mod: 26

## 2023-08-18 DEVICE — CATH ENDOFLIP MEASURE 16MM: Type: IMPLANTABLE DEVICE | Status: FUNCTIONAL

## 2023-08-18 RX ORDER — TETRACAINE/BENZOCAINE/BUTAMBEN 2%-14%-2%
1 OINTMENT (GRAM) TOPICAL ONCE
Refills: 0 | Status: COMPLETED | OUTPATIENT
Start: 2023-08-18 | End: 2023-08-18

## 2023-08-18 RX ADMIN — DEXTROSE MONOHYDRATE, SODIUM CHLORIDE, AND POTASSIUM CHLORIDE 125 MILLILITER(S): 50; .745; 4.5 INJECTION, SOLUTION INTRAVENOUS at 02:38

## 2023-08-18 RX ADMIN — PANTOPRAZOLE SODIUM 40 MILLIGRAM(S): 20 TABLET, DELAYED RELEASE ORAL at 06:20

## 2023-08-18 RX ADMIN — Medication 1 SPRAY(S): at 18:44

## 2023-08-18 NOTE — CHART NOTE - NSCHARTNOTEFT_GEN_A_CORE
PA Note    NST reviewed with MFM. NST reactive. Patient for EGD and motility study.    Laurel Monae PA-C PA Note    NST reviewed with MFM. NST reactive. Patient for EGD and motility study.    Laurel Monae PA-C    ---   135

## 2023-08-18 NOTE — PROGRESS NOTE ADULT - ATTENDING COMMENTS
Patient seen and examined at bedside, agree with assessment and plan as stated above.  s/p EGD today, normal study.  NGT placed under sedation, patient states NGT "fell out" when she was scratching her nose this afternoon.   s/p GI and BH consultations, recs appreciated.    Duncan Boogie MD

## 2023-08-18 NOTE — CHART NOTE - NSCHARTNOTEFT_GEN_A_CORE
PA Note    Psychiatry reconsulted @1269 to r/o somatic symptom disorder. No structural abnormalities seen on EGD.     Laurel Monae PA-C

## 2023-08-18 NOTE — PROGRESS NOTE ADULT - SUBJECTIVE AND OBJECTIVE BOX
R3 Antepartum Note, HD#8    Patient seen and examined at bedside. Reports NPO since midnight. Pt aware and amenable for EGD today. No acute complaints. Pt reports +FM, denies LOF, VB, ctx, HA, epigastric pain, blurred vision, CP, SOB, N/V, fevers, and chills.    Vital Signs Last 24 Hours  T(C): 36.8 (08-18-23 @ 05:08), Max: 36.8 (08-17-23 @ 09:47)  HR: 75 (08-18-23 @ 05:08) (69 - 96)  BP: 111/67 (08-18-23 @ 05:08) (102/77 - 118/68)  RR: 17 (08-18-23 @ 05:08) (17 - 18)  SpO2: 100% (08-18-23 @ 05:08) (99% - 100%)    CAPILLARY BLOOD GLUCOSE          Physical Exam:  General: NAD  Abdomen: Soft, non-tender, gravid  Ext: No pain or swelling    NST reactive overnight    Labs:             9.3    5.74  )-----------( 148      ( 08-18 @ 06:34 )             29.3     08-18 @ 06:34    139  |  103  |  2   ----------------------------<  84  3.5   |  24  |  0.54    Ca    8.6      08-18 @ 06:34  Phos  4.0     08-17 @ 05:30  Mg     1.60     08-17 @ 05:30    TPro  6.0  /  Alb  3.0  /  TBili  0.5  /  DBili  x   /  AST  49  /  ALT  60  /  AlkPhos  133  08-18 @ 06:34            MEDICATIONS  (STANDING):  dextrose 5% + lactated ringers with potassium chloride 20 mEq/L 1000 milliLiter(s) (125 mL/Hr) IV Continuous <Continuous>  famotidine Injectable 20 milliGRAM(s) IV Push once  folic acid 1 milliGRAM(s) Oral daily  heparin   Injectable 5000 Unit(s) SubCutaneous every 12 hours  pantoprazole  Injectable 40 milliGRAM(s) IV Push every 12 hours  sodium chloride 0.9% 1000 milliLiter(s) (125 mL/Hr) IV Continuous <Continuous>    MEDICATIONS  (PRN):

## 2023-08-18 NOTE — CHART NOTE - NSCHARTNOTEFT_GEN_A_CORE
Pt with NG tube placement placed under GETA today after scheduled EGD today for tube feeds.     Pt reports that NG tube was dislodged while wiping her mouth this afternoon at ~4:30.   Discussion with patient at bedside about the importance caloric intake and the need for intervention to ensure the health and wellbeing of her and her fetus.   Pt verbalized understand and is amenable for NGT placement.     NGT advanced without difficulty and secured in place.   XR ordered and protocol to ensure correct placement 2/2 to initiation of NG tube feeds.     dw: Dr. Keagan Ford, PGY-3

## 2023-08-18 NOTE — PROGRESS NOTE ADULT - ASSESSMENT
39 y/o  34w pregnancy c/b IUGR3% UAD normal () p/w 2 days of inability to tolerate po. Ever since she contracted Covid 1.5 months ago patient has had progressive difficulty swallowing food. Pt reports a pureed diet for the past two weeks. Pt admitted for dysphagia workup. Continues to have poor PO intake with maternal weight loss and restricted fetal growth. NPO since midnight for anticipated EGD today.     #Dysphagia  - CMP this AM. Replete electrolytes as indicated. K >3 for planned EGD.   - Recommended and strongly encouraged tube feeds to ensure caloric intake for patient. Nutrition following.   - GI following, appreciate recs. Protonix 40mg IV, if no improvement with consider a EGD barium esophagram.   - Recommend EGD with motility study - to be preformed Friday. Pt aware.  NPO since midnight.    - Barium esophagram (); Able to swallow contrast and barium pill without difficulty. No stricture or mass effect noted.   - Viral and auto immune labs without identified cause    - ENT following, preformed flexibles endoscope. No acute intervention. F/U outpatient   - SW saw patient. Recommending psychiatric evaluation.  - Psychiatric consult placed. Recommended anxiety medications, however patient declined.   - Daily weights.   - Nutrition following.   - TPN consult(). Recommend tube feeds over TPN     #Transaminitis/elevated lipase  - AST/ALT: 111/123 -> 74/85->54/78->55/69->52/62->49/60  - Lipase 94  - RUQ sono (): WNL.      # IUGR - severe   - ATU (8/15): Breech, anterior, LIDIA 10.8, 1787g (3%, AC 2%, HC 1%) UAD wnl   - Repeat growth 3-4 weeks from prior    #Fetal wellbeing  - NST BID  - BPP 2x/wk  - PNV    #Maternal wellbeing  - HSQ for DVT ppx  - NPO for anticipated EGD today.     COOPER Ford, pgy-3

## 2023-08-18 NOTE — CHART NOTE - NSCHARTNOTEFT_GEN_A_CORE
pt referred for significant dysphagia starting at the level of the cricoid extending to the xiphoid  unable to tolerate even thin liquids  lost 20 lbs  34 weeks GA    Pt should undergo evaluation with EGD/endoflip   Given her weight loss, inability to tolerate PO and potential discussion of alternate means of nutrition such as TPN, this would be considered an urgent/emergent procedure

## 2023-08-18 NOTE — CHART NOTE - NSCHARTNOTEFT_GEN_A_CORE
s/p EGD/endoFLIP  demonstrating normal RAC pattern with EGJ-DI >2  KO feeding tube placed and endoscopically confirmed to be in the stomach  Pt reports throat irritation with KOFT  Encouraged pt to keep in KOFT  Recommend:  start TF once on the floor  needs redirection to avoid pulling tube   at bedside in agreeance with plan to feed via KOFT and by mouth

## 2023-08-18 NOTE — CHART NOTE - NSCHARTNOTEFT_GEN_A_CORE
R3 NG Tube    Initial abd x-ray with NG tube tip 3.5 cm from GEJ. Explained to patient need for advancement. Advanced without difficulty. Repeat Abd XR confirms NGT in stomach.    May proceed with tube feeds.    d/w attending Dr Keagan Oconnor  PGY3

## 2023-08-19 LAB
ALBUMIN SERPL ELPH-MCNC: 3.4 G/DL — SIGNIFICANT CHANGE UP (ref 3.3–5)
ALP SERPL-CCNC: 157 U/L — HIGH (ref 40–120)
ALT FLD-CCNC: 81 U/L — HIGH (ref 4–33)
ANION GAP SERPL CALC-SCNC: 14 MMOL/L — SIGNIFICANT CHANGE UP (ref 7–14)
AST SERPL-CCNC: 107 U/L — HIGH (ref 4–32)
BILIRUB SERPL-MCNC: 0.6 MG/DL — SIGNIFICANT CHANGE UP (ref 0.2–1.2)
BUN SERPL-MCNC: <2 MG/DL — LOW (ref 7–23)
CALCIUM SERPL-MCNC: 9.1 MG/DL — SIGNIFICANT CHANGE UP (ref 8.4–10.5)
CHLORIDE SERPL-SCNC: 98 MMOL/L — SIGNIFICANT CHANGE UP (ref 98–107)
CO2 SERPL-SCNC: 24 MMOL/L — SIGNIFICANT CHANGE UP (ref 22–31)
CREAT SERPL-MCNC: 0.58 MG/DL — SIGNIFICANT CHANGE UP (ref 0.5–1.3)
EGFR: 120 ML/MIN/1.73M2 — SIGNIFICANT CHANGE UP
GLUCOSE SERPL-MCNC: 106 MG/DL — HIGH (ref 70–99)
MAGNESIUM SERPL-MCNC: 1.7 MG/DL — SIGNIFICANT CHANGE UP (ref 1.6–2.6)
PHOSPHATE SERPL-MCNC: 3.4 MG/DL — SIGNIFICANT CHANGE UP (ref 2.5–4.5)
POTASSIUM SERPL-MCNC: 3.1 MMOL/L — LOW (ref 3.5–5.3)
POTASSIUM SERPL-SCNC: 3.1 MMOL/L — LOW (ref 3.5–5.3)
PROT SERPL-MCNC: 6.8 G/DL — SIGNIFICANT CHANGE UP (ref 6–8.3)
SODIUM SERPL-SCNC: 136 MMOL/L — SIGNIFICANT CHANGE UP (ref 135–145)

## 2023-08-19 PROCEDURE — 99231 SBSQ HOSP IP/OBS SF/LOW 25: CPT

## 2023-08-19 PROCEDURE — 99232 SBSQ HOSP IP/OBS MODERATE 35: CPT | Mod: GC

## 2023-08-19 RX ORDER — POTASSIUM CHLORIDE 20 MEQ
10 PACKET (EA) ORAL
Refills: 0 | Status: DISCONTINUED | OUTPATIENT
Start: 2023-08-19 | End: 2023-08-19

## 2023-08-19 RX ORDER — ACETAMINOPHEN 500 MG
1000 TABLET ORAL ONCE
Refills: 0 | Status: COMPLETED | OUTPATIENT
Start: 2023-08-19 | End: 2023-08-19

## 2023-08-19 RX ORDER — POTASSIUM CHLORIDE 20 MEQ
40 PACKET (EA) ORAL ONCE
Refills: 0 | Status: COMPLETED | OUTPATIENT
Start: 2023-08-19 | End: 2023-08-19

## 2023-08-19 RX ADMIN — HEPARIN SODIUM 5000 UNIT(S): 5000 INJECTION INTRAVENOUS; SUBCUTANEOUS at 21:55

## 2023-08-19 RX ADMIN — Medication 1000 MILLIGRAM(S): at 21:06

## 2023-08-19 RX ADMIN — Medication 400 MILLIGRAM(S): at 00:56

## 2023-08-19 RX ADMIN — HEPARIN SODIUM 5000 UNIT(S): 5000 INJECTION INTRAVENOUS; SUBCUTANEOUS at 00:10

## 2023-08-19 RX ADMIN — Medication 1000 MILLIGRAM(S): at 02:00

## 2023-08-19 RX ADMIN — SODIUM CHLORIDE 125 MILLILITER(S): 9 INJECTION, SOLUTION INTRAVENOUS at 19:30

## 2023-08-19 RX ADMIN — Medication 40 MILLIEQUIVALENT(S): at 22:11

## 2023-08-19 RX ADMIN — PANTOPRAZOLE SODIUM 40 MILLIGRAM(S): 20 TABLET, DELAYED RELEASE ORAL at 06:44

## 2023-08-19 RX ADMIN — Medication 400 MILLIGRAM(S): at 20:19

## 2023-08-19 NOTE — BH CONSULTATION LIAISON PROGRESS NOTE - CURRENT MEDICATION
MEDICATIONS  (STANDING):  dextrose 5% + lactated ringers with potassium chloride 20 mEq/L 1000 milliLiter(s) (125 mL/Hr) IV Continuous <Continuous>  famotidine Injectable 20 milliGRAM(s) IV Push once  folic acid 1 milliGRAM(s) Oral daily  heparin   Injectable 5000 Unit(s) SubCutaneous every 12 hours  pantoprazole  Injectable 40 milliGRAM(s) IV Push every 12 hours  sodium chloride 0.9% 1000 milliLiter(s) (125 mL/Hr) IV Continuous <Continuous>    MEDICATIONS  (PRN):

## 2023-08-19 NOTE — PROGRESS NOTE ADULT - ASSESSMENT
39 y/o  34w1d pregnancy c/b IUGR3% UAD normal () p/w 2 days of inability to tolerate po. Ever since she contracted Covid 1.5 months ago patient has had progressive difficulty swallowing food. Pt reports a pureed diet for the past two weeks. Pt admitted for dysphagia workup. Continues to have poor PO intake with maternal weight loss and restricted fetal growth. Had EGD and NGT placed yesterday and initiated tube feeds. Patient initially self d/c'ed first feeding tube and had NGT replaced.    #Dysphagia  - f/u AM CMP. Replete electrolytes as indicated. K >3 for planned EGD.   - Recommended and strongly encouraged tube feeds to ensure caloric intake for patient. Nutrition following.   - GI following, appreciate recs. Protonix 40mg IV, if no improvement with consider a EGD barium esophagram.   - Recommend EGD with motility study - to be preformed Friday. Pt aware.  NPO since midnight.    - Barium esophagram (); Able to swallow contrast and barium pill without difficulty. No stricture or mass effect noted.   - EGD (): normal RAC pattern, with additional KO feeding tube placement  - Viral and auto immune labs without identified cause    - ENT following, preformed flexibles endoscope. No acute intervention. F/U outpatient   - SW saw patient. Recommending psychiatric evaluation.  - Psychiatric consult placed. Recommended anxiety medications, however patient declined.   - Daily weights.   - Nutrition following.   - TPN consult (). Recommend tube feeds over TPN     #Transaminitis/elevated lipase  - AST/ALT: 111/123 -> 74/85->54/78->55/69->52/62->49/60  - Lipase 94  - RUQ sono (): WNL    # IUGR - severe   - ATU (8/15): Breech, anterior, LIDIA 10.8, 1787g (3%, AC 2%, HC 1%) UAD wnl   - Repeat growth 3-4 weeks from prior    #Fetal wellbeing  - NST BID  - BPP 2x/wk  - PNV    #Maternal wellbeing  - HSQ for DVT ppx  - C/w tube feeds per nutrition    Luma Sheu  PGY3

## 2023-08-19 NOTE — PROGRESS NOTE ADULT - ATTENDING COMMENTS
Dysphagia of unclear etiology  Normal EGD (pending biopsies)  Normal esophagram  Outpatient ENT unrevealing, but would recommend inpatient exam as well  Not using NG tube for feeding, would recommend DC

## 2023-08-19 NOTE — BH CONSULTATION LIAISON PROGRESS NOTE - NSBHCHARTREVIEWVS_PSY_A_CORE FT
Vital Signs Last 24 Hrs  T(C): 37 (19 Aug 2023 13:35), Max: 37 (19 Aug 2023 13:35)  T(F): 98.6 (19 Aug 2023 13:35), Max: 98.6 (19 Aug 2023 13:35)  HR: 95 (19 Aug 2023 13:35) (83 - 104)  BP: 107/65 (19 Aug 2023 13:35) (103/61 - 111/73)  BP(mean): --  RR: 18 (19 Aug 2023 13:35) (16 - 18)  SpO2: 100% (19 Aug 2023 13:35) (97% - 100%)    Parameters below as of 19 Aug 2023 13:35  Patient On (Oxygen Delivery Method): room air

## 2023-08-19 NOTE — PROGRESS NOTE ADULT - SUBJECTIVE AND OBJECTIVE BOX
Gastroenterology/Hepatology Progress Note      Interval Events:     Allergies:  No Known Drug Allergies  shellfish (Hives)  Beef (Unknown)      Hospital Medications:  dextrose 5% + lactated ringers with potassium chloride 20 mEq/L 1000 milliLiter(s) IV Continuous <Continuous>  famotidine Injectable 20 milliGRAM(s) IV Push once  folic acid 1 milliGRAM(s) Oral daily  heparin   Injectable 5000 Unit(s) SubCutaneous every 12 hours  pantoprazole  Injectable 40 milliGRAM(s) IV Push every 12 hours  sodium chloride 0.9% 1000 milliLiter(s) IV Continuous <Continuous>      ROS: 14 point ROS negative unless otherwise state in subjective    PHYSICAL EXAM:   Vital Signs:  Vital Signs Last 24 Hrs  T(C): 36.6 (19 Aug 2023 05:34), Max: 36.9 (19 Aug 2023 01:36)  T(F): 97.9 (19 Aug 2023 05:34), Max: 98.5 (19 Aug 2023 01:36)  HR: 93 (19 Aug 2023 05:34) (80 - 104)  BP: 111/73 (19 Aug 2023 05:34) (103/61 - 132/83)  BP(mean): --  RR: 16 (19 Aug 2023 05:34) (14 - 17)  SpO2: 100% (19 Aug 2023 05:34) (97% - 100%)    Parameters below as of 19 Aug 2023 05:34  Patient On (Oxygen Delivery Method): room air      Daily     Daily     GENERAL:  No acute distress  HEENT:  NCAT, no scleral icterus  CHEST: no resp distress  HEART:  RRR  ABDOMEN:  Soft, non-tender, non-distended, normoactive bowel sounds, no masses  EXTREMITIES:  No cyanosis, clubbing, or edema  SKIN:  No rash/erythema/ecchymoses/petechiae/wounds/abscess/warm/dry  NEURO:  Alert and oriented x 3, no asterixis, no tremor    LABS:                        9.3    5.74  )-----------( 148      ( 18 Aug 2023 06:34 )             29.3       08-18    139  |  103  |  2<L>  ----------------------------<  84  3.5   |  24  |  0.54    Ca    8.6      18 Aug 2023 06:34    TPro  6.0  /  Alb  3.0<L>  /  TBili  0.5  /  DBili  x   /  AST  49<H>  /  ALT  60<H>  /  AlkPhos  133<H>  08-18    LIVER FUNCTIONS - ( 18 Aug 2023 06:34 )  Alb: 3.0 g/dL / Pro: 6.0 g/dL / ALK PHOS: 133 U/L / ALT: 60 U/L / AST: 49 U/L / GGT: x             Urinalysis Basic - ( 18 Aug 2023 06:34 )    Color: x / Appearance: x / SG: x / pH: x  Gluc: 84 mg/dL / Ketone: x  / Bili: x / Urobili: x   Blood: x / Protein: x / Nitrite: x   Leuk Esterase: x / RBC: x / WBC x   Sq Epi: x / Non Sq Epi: x / Bacteria: x            Imaging:           Gastroenterology/Hepatology Progress Note      Interval Events:   - This morning patient complained of globus sensation in her throat, still persistent, feels like stuck sensation in the throat.   - Also started to have sore throat since the NGT placement.   - Was on tube feeds only for 10 minutes and patient wanted it to be discontinued.   - Reported she was evaluated by ENT as o/p with laryngoscope, no abnormalities.     Allergies:  No Known Drug Allergies  shellfish (Hives)  Beef (Unknown)      Hospital Medications:  dextrose 5% + lactated ringers with potassium chloride 20 mEq/L 1000 milliLiter(s) IV Continuous <Continuous>  famotidine Injectable 20 milliGRAM(s) IV Push once  folic acid 1 milliGRAM(s) Oral daily  heparin   Injectable 5000 Unit(s) SubCutaneous every 12 hours  pantoprazole  Injectable 40 milliGRAM(s) IV Push every 12 hours  sodium chloride 0.9% 1000 milliLiter(s) IV Continuous <Continuous>      ROS: 14 point ROS negative unless otherwise state in subjective    PHYSICAL EXAM:   Vital Signs:  Vital Signs Last 24 Hrs  T(C): 36.6 (19 Aug 2023 05:34), Max: 36.9 (19 Aug 2023 01:36)  T(F): 97.9 (19 Aug 2023 05:34), Max: 98.5 (19 Aug 2023 01:36)  HR: 93 (19 Aug 2023 05:34) (80 - 104)  BP: 111/73 (19 Aug 2023 05:34) (103/61 - 132/83)  BP(mean): --  RR: 16 (19 Aug 2023 05:34) (14 - 17)  SpO2: 100% (19 Aug 2023 05:34) (97% - 100%)    Parameters below as of 19 Aug 2023 05:34  Patient On (Oxygen Delivery Method): room air      Daily     Daily     GENERAL:  No acute distress, young female.   HEENT:  NCAT, no scleral icterus, has NGT in place.   CHEST: no resp distress  HEART:  RRR  ABDOMEN:  Soft, non-tender, gravid uterus, no masses  EXTREMITIES:  No LE edema  SKIN:  No rash/erythema/ecchymoses/petechiae/wounds/abscess/warm/dry  NEURO:  Alert and oriented x 3, no tremor    LABS:                        9.3    5.74  )-----------( 148      ( 18 Aug 2023 06:34 )             29.3       08-18    139  |  103  |  2<L>  ----------------------------<  84  3.5   |  24  |  0.54    Ca    8.6      18 Aug 2023 06:34    TPro  6.0  /  Alb  3.0<L>  /  TBili  0.5  /  DBili  x   /  AST  49<H>  /  ALT  60<H>  /  AlkPhos  133<H>  08-18    LIVER FUNCTIONS - ( 18 Aug 2023 06:34 )  Alb: 3.0 g/dL / Pro: 6.0 g/dL / ALK PHOS: 133 U/L / ALT: 60 U/L / AST: 49 U/L / GGT: x             Urinalysis Basic - ( 18 Aug 2023 06:34 )    Color: x / Appearance: x / SG: x / pH: x  Gluc: 84 mg/dL / Ketone: x  / Bili: x / Urobili: x   Blood: x / Protein: x / Nitrite: x   Leuk Esterase: x / RBC: x / WBC x   Sq Epi: x / Non Sq Epi: x / Bacteria: x            Imaging:

## 2023-08-19 NOTE — CHART NOTE - NSCHARTNOTEFT_GEN_A_CORE
R3 Eval Note    Charting delayed 2/2 clinical duties, patient assessed multiple times over the course of the night    Patient seen at bedside initially for adjustment of NG tube, after which she was amenable to receiving tube feeds as recommended by nutrition team.    Patient then was complaining of discomfort with continuous feeds, requesting to have tube feeds discontinued to allow for walking around and for sleep. Counseled patient on current reason for admission to hospital and recommended feeding schedule per nutrition. At this time patient pointed out that she had her  pause the tube feed while she went to the bathroom and requested to have it discontinued. Once again explained to patient reasoning behind continuous feeds. Patient voices understanding but continues to decline current management.    Later notified by RN around 2am that patient requesting to have a regular diet, with NG tube in place. Given recent plan for continuous feeds and NG tube placement, to continue with tube feeds.    Plan  39 y/o  at 34w3d admitted for management of weight loss, limited PO intake, now with IUGR fetus. Patient received NG tube placement for tube feeds during the PM.    - Continue to monitor  - Consider revisiting nutrition recommendations regarding continuous tube feeds to see whether adjustment can be made to allow for pt's preference for walking and sleep    d/w attending Dr Keagan Oconnor  PGY3

## 2023-08-19 NOTE — PROGRESS NOTE ADULT - SUBJECTIVE AND OBJECTIVE BOX
ANESTHESIA POSTOPERATIVE NOTE    Patient is a 38y old  Female who presents with a chief complaint of poor PO intake (17 Aug 2023 08:33) s/p EGD        Vital Signs Last 24 Hrs  T(C): 37.1 (19 Aug 2023 17:07), Max: 37.1 (19 Aug 2023 17:07)  T(F): 98.8 (19 Aug 2023 17:07), Max: 98.8 (19 Aug 2023 17:07)  HR: 88 (19 Aug 2023 17:07) (83 - 104)  BP: 120/72 (19 Aug 2023 17:07) (103/61 - 120/72)  BP(mean): --  RR: 17 (19 Aug 2023 17:07) (16 - 18)  SpO2: 98% (19 Aug 2023 17:07) (97% - 100%)    Parameters below as of 19 Aug 2023 17:07  Patient On (Oxygen Delivery Method): room air          [x ] No apparent anesthesia related complications     Comments:

## 2023-08-19 NOTE — PROGRESS NOTE ADULT - SUBJECTIVE AND OBJECTIVE BOX
R3 Antepartum Note    Patient seen and examined at bedside, states she was able to get a small amount of sleep. Continues to note discomfort from the NG tube. No acute complaints. Pt reports +FM, denies LOF, VB, contractions, HA, epigastric pain, blurred vision, CP, SOB, N/V, fevers, and chills.    Vital Signs Last 24 Hours  T(C): 36.9 (08-19-23 @ 01:36), Max: 37.1 (08-18-23 @ 09:33)  HR: 93 (08-19-23 @ 05:34) (72 - 104)  BP: 111/73 (08-19-23 @ 05:34) (102/61 - 132/83)  RR: 16 (08-19-23 @ 01:36) (14 - 17)  SpO2: 100% (08-19-23 @ 05:33) (97% - 100%)    Physical Exam:  General: NAD  Abdomen: Soft, non-tender, gravid  Ext: No pain or swelling  Lines: NGT in place    NST reactive overnight    Labs:             9.3    5.74  )-----------( 148      ( 08-18 @ 06:34 )             29.3     08-18 @ 06:34    139  |  103  |  2   ----------------------------<  84  3.5   |  24  |  0.54    Ca    8.6      08-18 @ 06:34    TPro  6.0  /  Alb  3.0  /  TBili  0.5  /  DBili  x   /  AST  49  /  ALT  60  /  AlkPhos  133  08-18 @ 06:34    MEDICATIONS  (STANDING):  dextrose 5% + lactated ringers with potassium chloride 20 mEq/L 1000 milliLiter(s) (125 mL/Hr) IV Continuous <Continuous>  famotidine Injectable 20 milliGRAM(s) IV Push once  folic acid 1 milliGRAM(s) Oral daily  heparin   Injectable 5000 Unit(s) SubCutaneous every 12 hours  pantoprazole  Injectable 40 milliGRAM(s) IV Push every 12 hours  sodium chloride 0.9% 1000 milliLiter(s) (125 mL/Hr) IV Continuous <Continuous>    MEDICATIONS  (PRN):

## 2023-08-19 NOTE — BH CONSULTATION LIAISON PROGRESS NOTE - NSBHCONSULTFOLLOWAFTERCARE_PSY_A_CORE FT
No indication of inpatient psychiatry hospitalization    [facility]  [id number] [place] [address]  [phone]    [facility]  [phone]
[FreeTextEntry1] : Plasma von Willebrand factor (VWF) multimer analysis\par reveals absence or decreased abundance of the highest\par molecular weight multimers (HMWM), but no definitely\par increased abundance of lower molecular weight von\par Willebrand factor (VWF) multimers, suggesting an acquired\par rather than congenital abnormality of von Willebrand factor\par (VWF) multimers, if artifact can be excluded.  [place]\par decreased HMWM VWF multimers can occur in association with\par aortic valvular stenosis, hypertrophic obstructive\par cardiomyopathy, left ventricular assist device, congenital\par heart disease, thrombocytosis or monoclonal gammopathy, in\par addition to less frequent associations with other\par myeloproliferative or lymphoproliferative disorders,\par intravascular coagulation and fibrinolysis (ICF/DIC),\par vasculitis, thrombotic thrombocytopenic purpura (TTP) or\par hemolytic-uremic syndrome (HUS), etc., and is frequently\par but not always accompanied by discordantly lower VWF\par ristocetin cofactor activity and/or VWF activity (latex\par immunoassay) relative to VWF antigen level.  Alternatively,\par similar reductions of HMWM VWF multimers can be\par artifactual, reflecting suboptimal sample processing (e.g.,\par processing of blood and plasma samples at cold temperatures\par rather than room temperatures). Suggest clinical\par correlation, and if indicated consider follow-up von\par Willebrand disease (VWD) test panel ([facility]\par Consultation [id number], including VWF multimer analysis\par request), with careful attention to specimen preparation.
[FreeTextEntry1] : Plasma von Willebrand factor (VWF) multimer analysis\par reveals absence or decreased abundance of the highest\par molecular weight multimers (HMWM), but no definitely\par increased abundance of lower molecular weight von\par Willebrand factor (VWF) multimers, suggesting an acquired\par rather than congenital abnormality of von Willebrand factor\par (VWF) multimers, if artifact can be excluded.  [place]\par decreased HMWM VWF multimers can occur in association with\par aortic valvular stenosis, hypertrophic obstructive\par cardiomyopathy, left ventricular assist device, congenital\par heart disease, thrombocytosis or monoclonal gammopathy, in\par addition to less frequent associations with other\par myeloproliferative or lymphoproliferative disorders,\par intravascular coagulation and fibrinolysis (ICF/DIC),\par vasculitis, thrombotic thrombocytopenic purpura (TTP) or\par hemolytic-uremic syndrome (HUS), etc., and is frequently\par but not always accompanied by discordantly lower VWF\par ristocetin cofactor activity and/or VWF activity (latex\par immunoassay) relative to VWF antigen level.  Alternatively,\par similar reductions of HMWM VWF multimers can be\par artifactual, reflecting suboptimal sample processing (e.g.,\par processing of blood and plasma samples at cold temperatures\par rather than room temperatures). Suggest clinical\par correlation, and if indicated consider follow-up von\par Willebrand disease (VWD) test panel ([facility]\par Consultation [id number], including VWF multimer analysis\par request), with careful attention to specimen preparation.

## 2023-08-19 NOTE — BH CONSULTATION LIAISON PROGRESS NOTE - NSBHASSESSMENTFT_PSY_ALL_CORE
39 y/o [place] female, , domiciled with family with no prior psychiatric h/o, currently 33w6d pregnant,  c/b IUGR3% UAD normal (7/25) p/w 2 days of inability to tolerate PO. Ever since she contracted Covid 1.5 months ago patient has had progressive difficulty swallowing food. Pt admitted for dysphagia workup. Continues to have poor PO intake with maternal weight loss and restricted fetal growth. Planned EGD/ motility study tomorrow.  Psychiatry was consulted for anxiety.  Patient on evaluation is calm, has a euthymic affect, anxious about feeding tube placement and refusing currently. She is in agreement to undergo EGD tomorrow. Otherwise denying feeling acutely depressed/ anxious. Denying psychotic symptoms including AH, VH, paranoia. Risk assessment neg for SI/HI. She refused to try an anti-anxiety medication at this time. Psychoeducation provided.     Plan:  Routine observation  For anxiety, can use Benadryl 25 mg PO Q8 PRN  No standing psychotropics at this time - again offered to patient s/p EGD results   Continue to f/u with GI recs  accepting NG feeds  Rest of the management per primary team

## 2023-08-19 NOTE — BH CONSULTATION LIAISON PROGRESS NOTE - NSBHCHARTREVIEWLAB_PSY_A_CORE FT
9.3    5.74  )-----------( 148      ( 18 Aug 2023 06:34 )             29.3   08-19    136  |  98  |  <2<L>  ----------------------------<  106<H>  3.1<L>   |  24  |  0.58    Ca    9.1      19 Aug 2023 11:25  Phos  3.4     08-19  Mg     1.70     08-19    TPro  6.8  /  Alb  3.4  /  TBili  0.6  /  DBili  x   /  AST  107<H>  /  ALT  81<H>  /  AlkPhos  157<H>  08-19

## 2023-08-19 NOTE — PROGRESS NOTE ADULT - ASSESSMENT
37 y/o  33w3d presents with intolerance of po. She states she has had worsening dysphagia since the end of  when she was sick with covid. GI consulted for dysphagia.    #Dysphagia. New since the end of . Patient states it is to solids and liquids. Ddx includes reflux esophagitis, esophageal stricture, esophageal dysmotility  - Xray esophagram without evidence of esophageal dysmotility or mechanical obstructio  #Elevated transaminases: no known hx of liver disease. Noted to be uptrending since  in hepatocellular pattern. Denies use of OTC or herbal medications. Patient without HTN, thrombocytopenia, only trace protein in urine.   - downtrending but w/u so far unrevealing  - RUQ US unremarkable  - viral w/u w/out evidence of infection, AIH w/u negative  - EGD/Endoflip on  - was normal, biopsies pending.     Recommendations:   - Would recommend removing NGT if the patient does not want tube feeds.   - Advance diet as tolerated.   - Recommend ENT consult as the globus sensation is predominantly located in the throat, less concerned for esophageal pathology at this time w/ normal EGD/Endoflip.   - No further GI interventions at this time.     Thank you for the consult. Please call us back if you have any questions or concerns.     All recommendations are tentative until the note is attested by an attending.     Tico Oneill, PGY-5  Gastroenterology/Hepatology Fellow  Available on Microsoft Teams  [phone] (Short Range Pager)  [phone] (Long Range Pager)    After 5pm, please contact the on-call GI fellow. [phone]

## 2023-08-19 NOTE — BH CONSULTATION LIAISON PROGRESS NOTE - NSBHFUPINTERVALHXFT_PSY_A_CORE
Patient was seen and assessed at bedside. patient is alert, oriented, calm. Patient tells provider her EGD resulted negative however she still has the same symptoms of throat tightness and inability to tolerate eating. She did however tell provider she attempted a dew sips of hot chocolate and a small pice of bread. Patient is allowing for tube feeds at this time. no SI or HI.  Continues to decline medication at this time. Discussed options of treatment for anxiety however patient denies need for medication at this time.

## 2023-08-20 LAB
ALBUMIN SERPL ELPH-MCNC: 2.8 G/DL — LOW (ref 3.3–5)
ALP SERPL-CCNC: 129 U/L — HIGH (ref 40–120)
ALT FLD-CCNC: 65 U/L — HIGH (ref 4–33)
ANION GAP SERPL CALC-SCNC: 13 MMOL/L — SIGNIFICANT CHANGE UP (ref 7–14)
AST SERPL-CCNC: 79 U/L — HIGH (ref 4–32)
BILIRUB SERPL-MCNC: 0.4 MG/DL — SIGNIFICANT CHANGE UP (ref 0.2–1.2)
BUN SERPL-MCNC: 2 MG/DL — LOW (ref 7–23)
CALCIUM SERPL-MCNC: 8.6 MG/DL — SIGNIFICANT CHANGE UP (ref 8.4–10.5)
CHLORIDE SERPL-SCNC: 102 MMOL/L — SIGNIFICANT CHANGE UP (ref 98–107)
CO2 SERPL-SCNC: 23 MMOL/L — SIGNIFICANT CHANGE UP (ref 22–31)
CREAT SERPL-MCNC: 0.5 MG/DL — SIGNIFICANT CHANGE UP (ref 0.5–1.3)
EGFR: 125 ML/MIN/1.73M2 — SIGNIFICANT CHANGE UP
GLUCOSE SERPL-MCNC: 83 MG/DL — SIGNIFICANT CHANGE UP (ref 70–99)
MAGNESIUM SERPL-MCNC: 1.7 MG/DL — SIGNIFICANT CHANGE UP (ref 1.6–2.6)
PHOSPHATE SERPL-MCNC: 3.4 MG/DL — SIGNIFICANT CHANGE UP (ref 2.5–4.5)
POTASSIUM SERPL-MCNC: 3.3 MMOL/L — LOW (ref 3.5–5.3)
POTASSIUM SERPL-SCNC: 3.3 MMOL/L — LOW (ref 3.5–5.3)
PROT SERPL-MCNC: 5.7 G/DL — LOW (ref 6–8.3)
SODIUM SERPL-SCNC: 138 MMOL/L — SIGNIFICANT CHANGE UP (ref 135–145)

## 2023-08-20 PROCEDURE — 99232 SBSQ HOSP IP/OBS MODERATE 35: CPT | Mod: GC

## 2023-08-20 RX ORDER — POTASSIUM CHLORIDE 20 MEQ
10 PACKET (EA) ORAL
Refills: 0 | Status: COMPLETED | OUTPATIENT
Start: 2023-08-20 | End: 2023-08-20

## 2023-08-20 RX ORDER — ACETAMINOPHEN 500 MG
975 TABLET ORAL ONCE
Refills: 0 | Status: DISCONTINUED | OUTPATIENT
Start: 2023-08-20 | End: 2023-09-03

## 2023-08-20 RX ORDER — ACETAMINOPHEN 500 MG
1000 TABLET ORAL ONCE
Refills: 0 | Status: COMPLETED | OUTPATIENT
Start: 2023-08-20 | End: 2023-08-20

## 2023-08-20 RX ADMIN — Medication 100 MILLIEQUIVALENT(S): at 16:02

## 2023-08-20 RX ADMIN — PANTOPRAZOLE SODIUM 40 MILLIGRAM(S): 20 TABLET, DELAYED RELEASE ORAL at 06:01

## 2023-08-20 RX ADMIN — Medication 100 MILLIEQUIVALENT(S): at 18:24

## 2023-08-20 RX ADMIN — DEXTROSE MONOHYDRATE, SODIUM CHLORIDE, AND POTASSIUM CHLORIDE 125 MILLILITER(S): 50; .745; 4.5 INJECTION, SOLUTION INTRAVENOUS at 14:39

## 2023-08-20 RX ADMIN — HEPARIN SODIUM 5000 UNIT(S): 5000 INJECTION INTRAVENOUS; SUBCUTANEOUS at 10:25

## 2023-08-20 RX ADMIN — Medication 100 MILLIEQUIVALENT(S): at 14:39

## 2023-08-20 RX ADMIN — SODIUM CHLORIDE 125 MILLILITER(S): 9 INJECTION, SOLUTION INTRAVENOUS at 23:00

## 2023-08-20 RX ADMIN — PANTOPRAZOLE SODIUM 40 MILLIGRAM(S): 20 TABLET, DELAYED RELEASE ORAL at 18:45

## 2023-08-20 RX ADMIN — HEPARIN SODIUM 5000 UNIT(S): 5000 INJECTION INTRAVENOUS; SUBCUTANEOUS at 23:03

## 2023-08-20 RX ADMIN — Medication 100 MILLIEQUIVALENT(S): at 17:15

## 2023-08-20 RX ADMIN — Medication 400 MILLIGRAM(S): at 14:15

## 2023-08-20 NOTE — PROGRESS NOTE ADULT - ASSESSMENT
37 y/o  34w4d pregnancy c/b IUGR3% UAD normal () p/w 2 days of inability to tolerate po. Ever since she contracted Covid 1.5 months ago patient has had progressive difficulty swallowing food. Pt reports a pureed diet for the past two weeks. Pt admitted for dysphagia workup. Continues to have poor PO intake with maternal weight loss and restricted fetal growth. Utilizing tube feeds, refusing overnight feeds.     #Dysphagia  - f/u AM CMP. Replete electrolytes as indicated.  - Recommended and strongly encouraged tube feeds to ensure caloric intake for patient. Nutrition following.   - GI work up as below. No identified cause found. Signed off.   - EGD with motility study (). Negative  - Barium esophagram (); Able to swallow contrast and barium pill without difficulty. No stricture or mass effect noted.   - EGD (): normal RAC pattern, with additional KO feeding tube placement  - Viral and auto immune labs without identified cause    - ENT following, preformed flexibles endoscope. No acute intervention. F/U outpatient   - SW saw patient. Recommending psychiatric evaluation.  - Psychiatric consult placed. Recommended anxiety medications, however patient declined.   - Daily weights. Admission weight 56.6kg, most recently 54kg   - Nutrition following. On continuous tube feeds (- )   - TPN consult (). Recommend tube feeds over TPN     #Transaminitis/elevated lipase  - AST/ALT: 111/123 -> 74/85->54/78->55/69->52/62->49/60->107/81  - Lipase 94  - RUQ sono (): WNL    # IUGR - severe   - ATU (): Breech, anterior, LIDIA 10 BPP    - ATU (8/15): Breech, anterior, LIDIA 10.8, 1787g (3%, AC 2%, HC 1%) UAD wnl   - Repeat growth 3-4 weeks from prior    #Fetal wellbeing  - NST BID  - BPP 2x/wk  - PNV    #Maternal wellbeing  - HSQ for DVT ppx  - C/w tube feeds per nutrition 39 y/o  34w4d pregnancy c/b IUGR3% UAD normal () p/w 2 days of inability to tolerate po. Ever since she contracted Covid 1.5 months ago patient has had progressive difficulty swallowing food. Pt reports a pureed diet for the past two weeks. Pt admitted for dysphagia workup. Continues to have poor PO intake with maternal weight loss and restricted fetal growth. Utilizing tube feeds, refusing overnight feeds.     #Dysphagia  - f/u AM CMP. Replete electrolytes as indicated.  - Recommended and strongly encouraged tube feeds to ensure caloric intake for patient. Nutrition following.   - GI work up as below. No identified cause found. Signed off.   - EGD with motility study (). Negative  - Barium esophagram (); Able to swallow contrast and barium pill without difficulty. No stricture or mass effect noted.   - EGD (): normal RAC pattern, with additional KO feeding tube placement  - Viral and auto immune labs without identified cause    - ENT following, preformed flexibles endoscope. No acute intervention. F/U outpatient   - SW saw patient. Recommending psychiatric evaluation.  - Psychiatric consult placed. Recommended anxiety medications, however patient declined.   - Daily weights. Admission weight 56.6kg, most recently 54kg   - Nutrition following. On continuous tube feeds (- )   - TPN consult (). Recommend tube feeds over TPN     #Transaminitis/elevated lipase  - AST/ALT: 111/123 -> 74/85->54/78->55/69->52/62->49/60->107/81  - Lipase 94  - RUQ sono (): WNL    # IUGR - severe   - ATU (): Breech, anterior, LIDIA 10 BPP    - ATU (8/15): Breech, anterior, LIDIA 10.8, 1787g (3%, AC 2%, HC 1%) UAD wnl   - Repeat growth 3-4 weeks from prior    #Fetal wellbeing  - NST BID  - BPP 2x/wk  - PNV    #Maternal wellbeing  - HSQ for DVT ppx  - C/w tube feeds per nutrition    Kenzie Ford, pgy-3 39 y/o  34w5d pregnancy c/b IUGR3% UAD normal () p/w 2 days of inability to tolerate po. Ever since she contracted Covid 1.5 months ago patient has had progressive difficulty swallowing food. Pt reports a pureed diet for the past two weeks. Pt admitted for dysphagia workup. Utilizing tube feeds, tolerating a puree diet with limited oral intake.     #Dysphagia  - f/u AM CMP. Replete electrolytes as indicated.  - Recommended and strongly encouraged tube feeds to ensure caloric intake for patient. Nutrition following.   - GI work up as below. No identified cause found. Signed off.   - EGD with motility study (). Negative  - Barium esophagram (); Able to swallow contrast and barium pill without difficulty. No stricture or mass effect noted.   - EGD (): normal RAC pattern, with additional KO feeding tube placement  - Viral and auto immune labs without identified cause    - ENT following, preformed flexibles endoscope. No acute intervention. F/U outpatient   - SW saw patient. Recommending psychiatric evaluation.  - Psychiatric consult placed. Recommended anxiety medications, however patient declined.   - Daily weights. Admission weight 56.6kg, most recently 54kg   - Nutrition following. On continuous tube feeds (- )   - TPN consult (). Recommend tube feeds over TPN     #Transaminitis/elevated lipase  - AST/ALT: 111/123 -> 74/85->54/78->55/69->52/62->49/60->107/81  - Lipase 94  - RUQ sono (): WNL    # IUGR - severe   - ATU (): Breech, anterior, LIDIA 10 BPP    - ATU (8/15): Breech, anterior, LIDIA 10.8, 1787g (3%, AC 2%, HC 1%) UAD wnl   - Repeat growth 3-4 weeks from prior    #Fetal wellbeing  - NST BID  - BPP 2x/wk  - PNV    #Maternal wellbeing  - HSQ for DVT ppx  - C/w tube feeds per nutrition  - Case management planning.     Kenzie Ford, pgy-3

## 2023-08-20 NOTE — CHART NOTE - NSCHARTNOTEFT_GEN_A_CORE
Pt seen for Severe malnutrition follow up     Medical Course: Per chart 39 y/o  34w4d pregnancy c/b IUGR3% UAD normal () p/w 2 days of inability to tolerate po. Ever since she contracted Covid 1.5 months ago patient has had progressive difficulty swallowing food. Pt reports a pureed diet for the past two weeks. Pt admitted for dysphagia workup. Continues to have poor PO intake with maternal weight loss and restricted fetal growth. Utilizing tube feeds, refusing overnight feeds.     Nutrition Course: Pt A&Ox2 currently NPO receiving NGT feedings of Pivot 1.5 @25ml/hr x24 hr providing 600ml total formula, 900kcal, 56g pro. Current regimen not meeting estimated needs in addition to Pt refusing feeds and stopping them intermittently.   Pt underwent EGD with no abnormalities per chart note . Per discussion with resident, Pt has provider to RN order allowing soft foods, amenable to changing NPO status to puree diet. After discussion with resident and Pt, both amenable to trialing calorie dense formula to provide increased calories over shorter period of time. Pt does not want feeds overnight, prefers to have them during the day started @11am. Pt also requesting PO supplement, multiple ensure clear at bedside untouched.   Recommend Twocal HN @ 30ml/hr increasing 10ml Q4 hr to goal rate of 60ml/hr x12 hrs to provide 720ml, 1440kcal, 60g pro, 504ml free water from formula. TF does not fully meet estimated needs as Pt wants to eat/drink by mouth and requesting PO supplement + puree diet.     Diet Prescription: Diet, NPO with Tube Feed:   Tube Feeding Modality: Nasogastric  Pivot 1.5 Tanvir (PIVOT1.5RTH)  Total Volume for 24 Hours (mL): 600  Continuous  Starting Tube Feed Rate {mL per Hour}: 25  Increase Tube Feed Rate by (mL): 0  Until Goal Tube Feed Rate (mL per Hour): 25  Tube Feed Duration (in Hours): 24  Tube Feed Start Time: 18:00  Supplement Feeding Modality:  Orogastric  Ensure Clear Cans or Servings Per Day:  3       Frequency:  Daily (23 @ 15:18)    Pertinent Medications: MEDICATIONS  (STANDING):  acetaminophen     Tablet .. 975 milliGRAM(s) Oral once  acetaminophen   IVPB .. 1000 milliGRAM(s) IV Intermittent once  dextrose 5% + lactated ringers with potassium chloride 20 mEq/L 1000 milliLiter(s) (125 mL/Hr) IV Continuous <Continuous>  famotidine Injectable 20 milliGRAM(s) IV Push once  folic acid 1 milliGRAM(s) Oral daily  heparin   Injectable 5000 Unit(s) SubCutaneous every 12 hours  pantoprazole  Injectable 40 milliGRAM(s) IV Push every 12 hours  potassium chloride  10 mEq/100 mL IVPB 10 milliEquivalent(s) IV Intermittent every 1 hour  sodium chloride 0.9% 1000 milliLiter(s) (125 mL/Hr) IV Continuous <Continuous>    MEDICATIONS  (PRN):    Pertinent Labs: 08- Na138 mmol/L Glu 83 mg/dL K+ 3.3 mmol/L<L> Cr  0.50 mg/dL BUN 2 mg/dL<L> 08- Phos 3.4 mg/dL 08- Alb 2.8 g/dL<L>      Weight: Height (cm): 167.7 ( @ 10:30)  Weight (kg): 54 ( @ 10:30)  BMI (kg/m2): 19.2 ( @ 10:30)  Weight Assessment: : 56.6 kg, : 55.4 kg (1.6% weight loss x3 days, due to inadequate PO and TF).      Physical Assessment, per flowsheets:  Edema: No edema noted per RN flowsheets   Skin: Intact w/ no pressure injuries noted per RN flowsheets       Estimated Needs:   [X] No change since previous assessment   Kcal in 3rd trimester of pregnancy = 2447kcals (1995kcal (35kcal/kg/57kg pre-pregnancy weight + 452kcals for 3rd trimester)  1.6-1.8 g/kg (91-102g pro)    Previous Nutrition Diagnosis: severe protein calorie malnutrition    Nutrition Diagnosis is [ x] ongoing    Education:  [ x ] Given today             Type of Education Provided: Educated Pt on importance of compliance with TF and adequate PO intake as tolerated to support growth of fetus and health of Pt.       Interventions:   1) Recommend Twocal @30ml/hr increasing 10ml q4h to goal rate of 60ml/hr x12hrs. TF to start at 11am per Pt request   2) Recommend changing PO diet to puree (no beef no fish)   3) Recommend [employer] standard 1.4 PO supplement 2x/day   4) Obtain daily weights   5) Consider prenatal liquid multivitamin   6) Encourage PO intake and honor food preferences as able.   7) RD to follow up and adjust TF rate as needed based on % PO intake in RN flowsheets.     Monitor & Evaluate:  PO intake, tolerance to diet/supplement/EN, nutrition related lab values, weight trends, BMs/GI distress, hydration status, skin integrity.    Irma Anaya MS, RD| [id number] (Also available on TEAMS)

## 2023-08-20 NOTE — PROGRESS NOTE ADULT - SUBJECTIVE AND OBJECTIVE BOX
R3 Antepartum Note, HD#10    Patient seen and examined at bedside, no acute overnight events. Pt refusing tube feeds since midnight, states she will resume this morning. Pt reports +FM, denies LOF, VB, ctx, HA, epigastric pain, blurred vision, CP, SOB, N/V, fevers, and chills.    Vital Signs Last 24 Hours  T(C): 36.7 (08-20-23 @ 01:56), Max: 37.1 (08-19-23 @ 17:07)  HR: 68 (08-20-23 @ 01:56) (68 - 98)  BP: 107/79 (08-20-23 @ 01:56) (107/65 - 120/72)  RR: 17 (08-20-23 @ 01:56) (17 - 18)  SpO2: 100% (08-20-23 @ 01:56) (98% - 100%)    CAPILLARY BLOOD GLUCOSE          Physical Exam:  General: NAD  Abdomen: Soft, non-tender, gravid  Ext: No pain or swelling    NST reactive overnight    Labs:             9.3    5.74  )-----------( 148      ( 08-18 @ 06:34 )             29.3     08-19 @ 11:25    136  |  98  |  <2  ----------------------------<  106  3.1   |  24  |  0.58    Ca    9.1      08-19 @ 11:25  Phos  3.4     08-19 @ 11:25  Mg     1.70     08-19 @ 11:25    TPro  6.8  /  Alb  3.4  /  TBili  0.6  /  DBili  x   /  AST  107  /  ALT  81  /  AlkPhos  157  08-19 @ 11:25            MEDICATIONS  (STANDING):  dextrose 5% + lactated ringers with potassium chloride 20 mEq/L 1000 milliLiter(s) (125 mL/Hr) IV Continuous <Continuous>  famotidine Injectable 20 milliGRAM(s) IV Push once  folic acid 1 milliGRAM(s) Oral daily  heparin   Injectable 5000 Unit(s) SubCutaneous every 12 hours  pantoprazole  Injectable 40 milliGRAM(s) IV Push every 12 hours  sodium chloride 0.9% 1000 milliLiter(s) (125 mL/Hr) IV Continuous <Continuous>    MEDICATIONS  (PRN):   R3 Antepartum Note, HD#11    Patient seen and examined at bedside, no acute overnight events. Able to tolerate 40% of her puree diet. NGT still in place, tolerating tube feeds. Wants to walk outside today.   Pt reports +FM, denies LOF, VB, ctx, HA, epigastric pain, blurred vision, CP, SOB, N/V, fevers, and chills.    Vital Signs Last 24 Hours  T(C): 36.7 (08-20-23 @ 01:56), Max: 37.1 (08-19-23 @ 17:07)  HR: 68 (08-20-23 @ 01:56) (68 - 98)  BP: 107/79 (08-20-23 @ 01:56) (107/65 - 120/72)  RR: 17 (08-20-23 @ 01:56) (17 - 18)  SpO2: 100% (08-20-23 @ 01:56) (98% - 100%)    CAPILLARY BLOOD GLUCOSE      Physical Exam:  General: NAD  Abdomen: Soft, non-tender, gravid  Ext: No pain or swelling    NST reactive overnight    Labs:             9.3    5.74  )-----------( 148      ( 08-18 @ 06:34 )             29.3     08-19 @ 11:25    136  |  98  |  <2  ----------------------------<  106  3.1   |  24  |  0.58    Ca    9.1      08-19 @ 11:25  Phos  3.4     08-19 @ 11:25  Mg     1.70     08-19 @ 11:25    TPro  6.8  /  Alb  3.4  /  TBili  0.6  /  DBili  x   /  AST  107  /  ALT  81  /  AlkPhos  157  08-19 @ 11:25      MEDICATIONS  (STANDING):  dextrose 5% + lactated ringers with potassium chloride 20 mEq/L 1000 milliLiter(s) (125 mL/Hr) IV Continuous <Continuous>  famotidine Injectable 20 milliGRAM(s) IV Push once  folic acid 1 milliGRAM(s) Oral daily  heparin   Injectable 5000 Unit(s) SubCutaneous every 12 hours  pantoprazole  Injectable 40 milliGRAM(s) IV Push every 12 hours  sodium chloride 0.9% 1000 milliLiter(s) (125 mL/Hr) IV Continuous <Continuous>    MEDICATIONS  (PRN):

## 2023-08-21 ENCOUNTER — ASOB RESULT (OUTPATIENT)
Age: 38
End: 2023-08-21

## 2023-08-21 ENCOUNTER — APPOINTMENT (OUTPATIENT)
Dept: ANTEPARTUM | Facility: CLINIC | Age: 38
End: 2023-08-21
Payer: COMMERCIAL

## 2023-08-21 ENCOUNTER — APPOINTMENT (OUTPATIENT)
Dept: MATERNAL FETAL MEDICINE | Facility: CLINIC | Age: 38
End: 2023-08-21

## 2023-08-21 ENCOUNTER — TRANSCRIPTION ENCOUNTER (OUTPATIENT)
Age: 38
End: 2023-08-21

## 2023-08-21 LAB
ALBUMIN SERPL ELPH-MCNC: 3.2 G/DL — LOW (ref 3.3–5)
ALP SERPL-CCNC: 140 U/L — HIGH (ref 40–120)
ALT FLD-CCNC: 66 U/L — HIGH (ref 4–33)
ANION GAP SERPL CALC-SCNC: 6 MMOL/L — LOW (ref 7–14)
AST SERPL-CCNC: 64 U/L — HIGH (ref 4–32)
BILIRUB SERPL-MCNC: 0.3 MG/DL — SIGNIFICANT CHANGE UP (ref 0.2–1.2)
BLD GP AB SCN SERPL QL: NEGATIVE — SIGNIFICANT CHANGE UP
BUN SERPL-MCNC: 4 MG/DL — LOW (ref 7–23)
CALCIUM SERPL-MCNC: 8.5 MG/DL — SIGNIFICANT CHANGE UP (ref 8.4–10.5)
CHLORIDE SERPL-SCNC: 103 MMOL/L — SIGNIFICANT CHANGE UP (ref 98–107)
CO2 SERPL-SCNC: 27 MMOL/L — SIGNIFICANT CHANGE UP (ref 22–31)
CREAT SERPL-MCNC: 0.48 MG/DL — LOW (ref 0.5–1.3)
EGFR: 126 ML/MIN/1.73M2 — SIGNIFICANT CHANGE UP
GLUCOSE SERPL-MCNC: 114 MG/DL — HIGH (ref 70–99)
MAGNESIUM SERPL-MCNC: 2 MG/DL — SIGNIFICANT CHANGE UP (ref 1.6–2.6)
PHOSPHATE SERPL-MCNC: 2.9 MG/DL — SIGNIFICANT CHANGE UP (ref 2.5–4.5)
POTASSIUM SERPL-MCNC: 3.4 MMOL/L — LOW (ref 3.5–5.3)
POTASSIUM SERPL-SCNC: 3.4 MMOL/L — LOW (ref 3.5–5.3)
PROT SERPL-MCNC: 6.3 G/DL — SIGNIFICANT CHANGE UP (ref 6–8.3)
RH IG SCN BLD-IMP: POSITIVE — SIGNIFICANT CHANGE UP
SODIUM SERPL-SCNC: 136 MMOL/L — SIGNIFICANT CHANGE UP (ref 135–145)

## 2023-08-21 PROCEDURE — 76819 FETAL BIOPHYS PROFIL W/O NST: CPT | Mod: 26

## 2023-08-21 PROCEDURE — 76820 UMBILICAL ARTERY ECHO: CPT | Mod: 26

## 2023-08-21 RX ADMIN — PANTOPRAZOLE SODIUM 40 MILLIGRAM(S): 20 TABLET, DELAYED RELEASE ORAL at 07:13

## 2023-08-21 RX ADMIN — SODIUM CHLORIDE 125 MILLILITER(S): 9 INJECTION, SOLUTION INTRAVENOUS at 21:16

## 2023-08-21 RX ADMIN — HEPARIN SODIUM 5000 UNIT(S): 5000 INJECTION INTRAVENOUS; SUBCUTANEOUS at 12:50

## 2023-08-21 RX ADMIN — HEPARIN SODIUM 5000 UNIT(S): 5000 INJECTION INTRAVENOUS; SUBCUTANEOUS at 22:24

## 2023-08-21 RX ADMIN — DEXTROSE MONOHYDRATE, SODIUM CHLORIDE, AND POTASSIUM CHLORIDE 125 MILLILITER(S): 50; .745; 4.5 INJECTION, SOLUTION INTRAVENOUS at 17:19

## 2023-08-21 RX ADMIN — DEXTROSE MONOHYDRATE, SODIUM CHLORIDE, AND POTASSIUM CHLORIDE 125 MILLILITER(S): 50; .745; 4.5 INJECTION, SOLUTION INTRAVENOUS at 03:12

## 2023-08-21 NOTE — DISCHARGE NOTE ANTEPARTUM - PATIENT PORTAL LINK FT
You can access the FollowMyHealth Patient Portal offered by [facility] by registering at the following website: [email or web address]. By joining [employer]’s FollowMyHealth portal, you will also be able to view your health information using other applications (apps) compatible with our system.

## 2023-08-21 NOTE — DISCHARGE NOTE ANTEPARTUM - CARE PLAN
Principal Discharge DX:	Malnutrition  Assessment and plan of treatment:	- Continue tube feedings via NG tube at home  - Follow with Maternal fetal medicine for twice weekly fetal testing  - Return to hospital for headaches, visual changes, RUQ pain, contractions, vaginal bleeding, leaking fluid or decreased in fetal movement   1 Principal Discharge DX:	Malnutrition  Assessment and plan of treatment:	- Continue tube feedings via NG tube at home  - Follow with Maternal fetal medicine for twice weekly fetal testing  - Return to hospital for headaches, visual changes, RUQ pain, contractions, vaginal bleeding, leaking fluid or decreased in fetal movement  - Please comply with tube feedings, follow up with dietitian within one week call for appointment at [phone]  monitor daily weights

## 2023-08-21 NOTE — DISCHARGE NOTE ANTEPARTUM - NS MD DC FALL RISK RISK
For information on Fall & Injury Prevention, visit: [email or web address] OR  [email or web address] OR  https://www.cdc.gov/steadi/patient.html

## 2023-08-21 NOTE — DISCHARGE NOTE ANTEPARTUM - HOSPITAL COURSE
39 y/o  34w5d pregnancy c/b IUGR3% UAD normal () p/w 2 days of inability to tolerate po. Ever since she contracted Covid 1.5 months ago patient has had progressive difficulty swallowing food. Pt reports a pureed diet for the past two weeks. Pt admitted for dysphagia workup. Utilizing tube feeds, tolerating a puree diet with limited oral intake.  Replete electrolytes as indicated.  - Recommended and strongly encouraged tube feeds to ensure caloric intake for patient. Nutrition following.   - GI work up as below. No identified cause found. Signed off.   - EGD with motility study (). Negative  - Barium esophagram (); Able to swallow contrast and barium pill without difficulty. No stricture or mass effect noted.   - EGD (): normal RAC pattern, with additional KO feeding tube placement  - Viral and auto immune labs without identified cause    - ENT following, preformed flexibles endoscope. No acute intervention. F/U outpatient   - SW saw patient. Recommending psychiatric evaluation.  - Psychiatric consult placed. Recommended anxiety medications, however patient declined.   - Daily weights. Admission weight 56.6kg, most recently 54kg   - Nutrition following. On continuous tube feeds (- )   - TPN consult (). Recommend tube feeds over TPN   - AST/ALT: 111/123 -> 74/85->54/78->55/69->52/62->49/60->107/81  - Lipase 94  - RUQ sono (): WNL  # IUGR - severe   - ATU (): Breech, anterior, LIDIA 10 BPP    - ATU (8/15): Breech, anterior, LIDIA 10.8, 1787g (3%, AC 2%, HC 1%) UAD wnl   - Repeat growth 3-4 weeks from prior     39 y/o  34w5d pregnancy c/b IUGR3% UAD normal () p/w 2 days of inability to tolerate po. Ever since she contracted Covid 1.5 months ago patient has had progressive difficulty swallowing food. Pt reports a pureed diet for the past two weeks. Pt admitted for dysphagia workup. Utilizing tube feeds, tolerating a puree diet with limited oral intake.  Replete electrolytes as indicated.  - Recommended and strongly encouraged tube feeds to ensure caloric intake for patient. Nutrition following. Rec: Change Tube feed to Jevity 1.5 @ 70 mL/hr (Providing 840 mL, 1260 kcal, 54 g protein) or [employer] 1.4 @ 70 ml/hr (Providing 840 mL, 1176 kcal, 52 g protein) Start tube feed at 10 mL/hr and increase by 10 mL every 4 hours until goal rate. GI work up as below. No identified cause found. Signed off.  EGD with motility study (). Negative  Barium esophagram (); Able to swallow contrast and barium pill without difficulty. No stricture or mass effect noted.  EGD (): normal RAC pattern, with additional KO feeding tube placement. Viral and auto immune labs without identified cause. ENT following, preformed flexibles endoscope. No acute intervention. F/U outpatient. SW saw patient. Recommending psychiatric evaluation.-> Psychiatric consult placed. Recommended anxiety medications, however patient declined. Daily weights being monitored and recommend to continue at home. Nutrition following. On continuous tube feeds since .  TPN consult (). Recommend tube feeds over TPN   AST/ALT: 111/123 -> 74/85->54/78->55/69->52/62->49/60->107/81->37/46.  RUQ sono (): WNL  Of note fetus  IUGR - severe.  ATU (8/15): Breech, anterior, LIDIA 10.8, 1787g (3%, AC 2%, HC 1%) UAD wnl. As outpatient will need twice weekly fetal testings and delivery between 37-38 weeks.

## 2023-08-21 NOTE — DISCHARGE NOTE ANTEPARTUM - ADDITIONAL INSTRUCTIONS
Follow up with OB and MFM within one week Follow up with OB and MFM within one week ( will need twice weekly testing with MFM)  Return to hospital for headaches, visual changes, RUQ pain, contractions, vaginal bleeding, leaking fluid or decreased in fetal movement

## 2023-08-21 NOTE — DISCHARGE NOTE ANTEPARTUM - PLAN OF CARE
- Continue tube feedings via NG tube at home  - Follow with Maternal fetal medicine for twice weekly fetal testing  - Return to hospital for headaches, visual changes, RUQ pain, contractions, vaginal bleeding, leaking fluid or decreased in fetal movement - Continue tube feedings via NG tube at home  - Follow with Maternal fetal medicine for twice weekly fetal testing  - Return to hospital for headaches, visual changes, RUQ pain, contractions, vaginal bleeding, leaking fluid or decreased in fetal movement  - Please comply with tube feedings, follow up with dietitian within one week call for appointment at [phone]  monitor daily weights

## 2023-08-21 NOTE — DISCHARGE NOTE ANTEPARTUM - CARE PROVIDER_API CALL
Charlene Mcgarry  Obstetrics and Gynecology  [address], [place] [address]  Phone: [phone]  Fax: [phone]  Follow Up Time:

## 2023-08-22 LAB
ALBUMIN SERPL ELPH-MCNC: 2.9 G/DL — LOW (ref 3.3–5)
ALP SERPL-CCNC: 129 U/L — HIGH (ref 40–120)
ALT FLD-CCNC: 50 U/L — HIGH (ref 4–33)
ANION GAP SERPL CALC-SCNC: 12 MMOL/L — SIGNIFICANT CHANGE UP (ref 7–14)
AST SERPL-CCNC: 42 U/L — HIGH (ref 4–32)
BILIRUB SERPL-MCNC: 0.3 MG/DL — SIGNIFICANT CHANGE UP (ref 0.2–1.2)
BUN SERPL-MCNC: 4 MG/DL — LOW (ref 7–23)
CALCIUM SERPL-MCNC: 8.5 MG/DL — SIGNIFICANT CHANGE UP (ref 8.4–10.5)
CHLORIDE SERPL-SCNC: 103 MMOL/L — SIGNIFICANT CHANGE UP (ref 98–107)
CO2 SERPL-SCNC: 23 MMOL/L — SIGNIFICANT CHANGE UP (ref 22–31)
CREAT SERPL-MCNC: 0.53 MG/DL — SIGNIFICANT CHANGE UP (ref 0.5–1.3)
EGFR: 123 ML/MIN/1.73M2 — SIGNIFICANT CHANGE UP
GLUCOSE SERPL-MCNC: 97 MG/DL — SIGNIFICANT CHANGE UP (ref 70–99)
MAGNESIUM SERPL-MCNC: 1.8 MG/DL — SIGNIFICANT CHANGE UP (ref 1.6–2.6)
PHOSPHATE SERPL-MCNC: 3.2 MG/DL — SIGNIFICANT CHANGE UP (ref 2.5–4.5)
POTASSIUM SERPL-MCNC: 3.6 MMOL/L — SIGNIFICANT CHANGE UP (ref 3.5–5.3)
POTASSIUM SERPL-SCNC: 3.6 MMOL/L — SIGNIFICANT CHANGE UP (ref 3.5–5.3)
PROT SERPL-MCNC: 6 G/DL — SIGNIFICANT CHANGE UP (ref 6–8.3)
SODIUM SERPL-SCNC: 138 MMOL/L — SIGNIFICANT CHANGE UP (ref 135–145)
SURGICAL PATHOLOGY STUDY: SIGNIFICANT CHANGE UP

## 2023-08-22 RX ORDER — METOCLOPRAMIDE HCL 10 MG
10 TABLET ORAL ONCE
Refills: 0 | Status: COMPLETED | OUTPATIENT
Start: 2023-08-22 | End: 2023-08-22

## 2023-08-22 RX ADMIN — SODIUM CHLORIDE 125 MILLILITER(S): 9 INJECTION, SOLUTION INTRAVENOUS at 21:15

## 2023-08-22 RX ADMIN — HEPARIN SODIUM 5000 UNIT(S): 5000 INJECTION INTRAVENOUS; SUBCUTANEOUS at 21:15

## 2023-08-22 RX ADMIN — PANTOPRAZOLE SODIUM 40 MILLIGRAM(S): 20 TABLET, DELAYED RELEASE ORAL at 23:29

## 2023-08-22 RX ADMIN — PANTOPRAZOLE SODIUM 40 MILLIGRAM(S): 20 TABLET, DELAYED RELEASE ORAL at 05:10

## 2023-08-22 RX ADMIN — DEXTROSE MONOHYDRATE, SODIUM CHLORIDE, AND POTASSIUM CHLORIDE 125 MILLILITER(S): 50; .745; 4.5 INJECTION, SOLUTION INTRAVENOUS at 05:09

## 2023-08-22 RX ADMIN — HEPARIN SODIUM 5000 UNIT(S): 5000 INJECTION INTRAVENOUS; SUBCUTANEOUS at 10:24

## 2023-08-22 NOTE — PROGRESS NOTE ADULT - ASSESSMENT
39 y/o  34w6d pregnancy c/b IUGR3% UAD normal () p/w 2 days of inability to tolerate po. Ever since she contracted Covid 1.5 months ago patient has had progressive difficulty swallowing food. Pt reports a pureed diet for the past two weeks. Pt admitted for dysphagia workup. Utilizing tube feeds, tolerating a puree diet with limited oral intake.     #Dysphagia  - f/u AM CMP. Replete electrolytes as indicated. No repletion x2 days.   - Recommended and strongly encouraged tube feeds to ensure caloric intake for patient. Nutrition following.   - GI work up as below. No identified cause found. Signed off.   - EGD with motility study (). Negative  - Barium esophagram (); Able to swallow contrast and barium pill without difficulty. No stricture or mass effect noted.   - EGD (): normal RAC pattern, with additional KO feeding tube placement  - Viral and auto immune labs without identified cause    - ENT following, preformed flexibles endoscope. No acute intervention. F/U outpatient   - SW saw patient. Recommending psychiatric evaluation.  - Psychiatric consult placed. Recommended anxiety medications, however patient declined.   - Daily weights. Admission weight 56.6kg, most recently 54kg   - Nutrition following. On continuous tube feeds (- )   - TPN consult (). Recommend tube feeds over TPN     #Transaminitis/elevated lipase - Improving.   - AST/ALT: 111/123 -> 74/85->54/78->55/69->52/62->49/60->107/81->42/50  - Lipase 94  - RUQ sono (): WNL    # IUGR - severe   - ATU (): Breech, anterior, LIDIA 17.23, UAD   - ATU (8/15): Breech, anterior, LIDIA 10.8, 1787g (3%, AC 2%, HC 1%) UAD wnl   - Repeat growth 3-4 weeks from prior    #Fetal wellbeing  - NST BID  - BPP 2x/wk  - PNV    #Maternal wellbeing  - HSQ for DVT ppx  - C/w tube feeds per nutrition  - Case management planning.     Kenzie Ford, pgy-3

## 2023-08-23 ENCOUNTER — APPOINTMENT (OUTPATIENT)
Dept: OBGYN | Facility: CLINIC | Age: 38
End: 2023-08-23

## 2023-08-23 LAB
ALBUMIN SERPL ELPH-MCNC: 3.1 G/DL — LOW (ref 3.3–5)
ALP SERPL-CCNC: 132 U/L — HIGH (ref 40–120)
ALT FLD-CCNC: 46 U/L — HIGH (ref 4–33)
ANION GAP SERPL CALC-SCNC: 7 MMOL/L — SIGNIFICANT CHANGE UP (ref 7–14)
AST SERPL-CCNC: 37 U/L — HIGH (ref 4–32)
BILIRUB SERPL-MCNC: 0.3 MG/DL — SIGNIFICANT CHANGE UP (ref 0.2–1.2)
BUN SERPL-MCNC: 5 MG/DL — LOW (ref 7–23)
CALCIUM SERPL-MCNC: 8.7 MG/DL — SIGNIFICANT CHANGE UP (ref 8.4–10.5)
CHLORIDE SERPL-SCNC: 102 MMOL/L — SIGNIFICANT CHANGE UP (ref 98–107)
CO2 SERPL-SCNC: 25 MMOL/L — SIGNIFICANT CHANGE UP (ref 22–31)
CREAT SERPL-MCNC: 0.47 MG/DL — LOW (ref 0.5–1.3)
EGFR: 126 ML/MIN/1.73M2 — SIGNIFICANT CHANGE UP
GLUCOSE SERPL-MCNC: 99 MG/DL — SIGNIFICANT CHANGE UP (ref 70–99)
MAGNESIUM SERPL-MCNC: 1.9 MG/DL — SIGNIFICANT CHANGE UP (ref 1.6–2.6)
PHOSPHATE SERPL-MCNC: 3.7 MG/DL — SIGNIFICANT CHANGE UP (ref 2.5–4.5)
POTASSIUM SERPL-MCNC: 4.1 MMOL/L — SIGNIFICANT CHANGE UP (ref 3.5–5.3)
POTASSIUM SERPL-SCNC: 4.1 MMOL/L — SIGNIFICANT CHANGE UP (ref 3.5–5.3)
PROT SERPL-MCNC: 6.3 G/DL — SIGNIFICANT CHANGE UP (ref 6–8.3)
SODIUM SERPL-SCNC: 134 MMOL/L — LOW (ref 135–145)

## 2023-08-23 PROCEDURE — 99233 SBSQ HOSP IP/OBS HIGH 50: CPT | Mod: GC

## 2023-08-23 RX ORDER — HYDROCORTISONE 1 %
1 OINTMENT (GRAM) TOPICAL THREE TIMES A DAY
Refills: 0 | Status: DISCONTINUED | OUTPATIENT
Start: 2023-08-23 | End: 2023-09-03

## 2023-08-23 RX ADMIN — HEPARIN SODIUM 5000 UNIT(S): 5000 INJECTION INTRAVENOUS; SUBCUTANEOUS at 23:03

## 2023-08-23 RX ADMIN — PANTOPRAZOLE SODIUM 40 MILLIGRAM(S): 20 TABLET, DELAYED RELEASE ORAL at 09:19

## 2023-08-23 RX ADMIN — SODIUM CHLORIDE 125 MILLILITER(S): 9 INJECTION, SOLUTION INTRAVENOUS at 22:55

## 2023-08-23 RX ADMIN — HEPARIN SODIUM 5000 UNIT(S): 5000 INJECTION INTRAVENOUS; SUBCUTANEOUS at 09:55

## 2023-08-23 RX ADMIN — PANTOPRAZOLE SODIUM 40 MILLIGRAM(S): 20 TABLET, DELAYED RELEASE ORAL at 22:56

## 2023-08-23 RX ADMIN — DEXTROSE MONOHYDRATE, SODIUM CHLORIDE, AND POTASSIUM CHLORIDE 125 MILLILITER(S): 50; .745; 4.5 INJECTION, SOLUTION INTRAVENOUS at 06:27

## 2023-08-23 NOTE — PROGRESS NOTE ADULT - ATTENDING COMMENTS
Pt seen and evaluated at bedside  Agree with above  Continue tube feeds, awaiting home care  Continue inpatient treatment    peggy TANNER

## 2023-08-23 NOTE — CHART NOTE - NSCHARTNOTEFT_GEN_A_CORE
Source: Patient A&Ox4   Family [X]     RN [X]    Chart [X]    Reason for Follow-Up Assessment: Length Of Stay (Enteral Nutrition)    Per chart 39 y/o  34w4d pregnancy c/b IUGR3% UAD normal () p/w 2 days of inability to tolerate po. Ever since she contracted Covid 1.5 months ago patient has had progressive difficulty swallowing food. Pt reports a pureed diet for the past two weeks. Pt admitted for dysphagia workup. Continues to have poor PO intake with maternal weight loss and restricted fetal growth. Utilizing tube feeds, refusing overnight feeds.     Previous Dietitian Note , Pt A&Ox2 currently NPO receiving NGT feedings of Pivot 1.5 @25ml/hr x24 hr providing 600ml total formula, 900kcal, 56g pro. Current regimen not meeting estimated needs in addition to Pt refusing feeds and stopping them intermittently. Pt underwent EGD with no abnormalities per chart note . Per discussion with resident, Pt has provider to RN order allowing soft foods, amenable to changing NPO status to puree diet. After discussion with resident and Pt, both amenable to trialing calorie dense formula to provide increased calories over shorter period of time. Pt does not want feeds overnight, prefers to have them during the day started @11am. Pt also requesting PO supplement, multiple ensure clear at bedside untouched. Recommend Twocal HN @ 30ml/hr increasing 10ml Q4 hr to goal rate of 60ml/hr x12 hrs to provide 720ml, 1440kcal, 60g pro, 504ml free water from formula. TF does not fully meet estimated needs as Pt wants to eat/drink by mouth and requesting PO supplement + puree diet.     Patient was seen at bedside with . Tube feed of Twocal was running at 60 ml/hr x 12 hours (providing 720 mL, 1440kcal, 60g protein). Discussed tube feeding options for discharge, patient would need to switch since current formula is backordered, dependant on what will be covered. Patient stated that she had GI issues with Pivot 1.5. Formula was changed to TwoCal which patient tolerates, yet she gets Acid reflux if she does not take her medication, but is tolerating better. Patient does not like puree foods and asked for a regular diet since she wants soup. No chewing or swallowing issues. Denies nausea or vomiting. No constipation or diarrhea.     Tube feeding for 12 hours does not cover all of patient's nutrient needs.     Diet, Regular (23 @ 08:30)    PO intake:  < 50% [X] 50-75% [ ]   % [ ]  other :    Anthropometrics: Height (cm): 167.7 ()  Weight (kg): 55.4 ()  BMI (kg/m2): 19.7 ()    Edema: +1 Left/Right Foot  Pressure Injuries: None    __________________ Pertinent Medications__________________   MEDICATIONS  (STANDING):  acetaminophen     Tablet .. 975 milliGRAM(s) Oral once  dextrose 5% + lactated ringers with potassium chloride 20 mEq/L 1000 milliLiter(s) (125 mL/Hr) IV Continuous <Continuous>  famotidine Injectable 20 milliGRAM(s) IV Push once  heparin   Injectable 5000 Unit(s) SubCutaneous every 12 hours  metoclopramide Injectable 10 milliGRAM(s) IV Push once  pantoprazole  Injectable 40 milliGRAM(s) IV Push every 12 hours  sodium chloride 0.9% 1000 milliLiter(s) (125 mL/Hr) IV Continuous <Continuous>    MEDICATIONS  (PRN):      __________________ Pertinent Labs__________________    Na134 mmol/L<L> Glu 99 mg/dL K+ 4.1 mmol/L Cr  0.47 mg/dL<L> BUN 5 mg/dL<L>  Phos 3.7 mg/dL  Alb 3.1 g/dL<L>      Needs Assessment:     Kcal in 3rd trimester of pregnancy = 2447 kcals     (1995kcal (35 kcal/kg /57 kg pre-pregnancy weight + 452 kcals for 3rd trimester)    Protein: 1.6 - 1.8 g/kg (91-102g pro)    Previous Nutrition Diagnosis: Severe malnutrition    Nutrition Diagnosis is [X] ongoing  [  ] resolved [  ] not applicable       Education:  [X] Given today        Type of education provided: Consuming adequate calories/protein and intake of enteral supplements.  [  ] Given on previous assessment by RD   [  ] Not applicable 2/2 cognitive deficit  [  ] Pt refusal of education offered   [  ] Not applicable 2/2 current prognosis  [  ] Not warranted at present      Recommendations:  1. Change Tube feed to Jevity 1.5 @ 70 mL/hr (Providing 840 mL, 1260 kcal, 54 g protein) or [employer] 1.4 @ 70 ml/hr (Providing 840 mL, 1176 kcal, 52 g protein)                   Start tube feed at 10 mL/hr and increase by 10 mL every 4 hours until goal rate    2. Continue Regular diet  3. Continue [employer] 1.4 2x daily  4. Consider prenatal liquid multivitamin   5. Encourage PO intake and honor food preferences as able  6. Please consult RD for any TF adjustments or calculations     Monitoring and Evaluation:   [X] Tolerance to diet prescription [X] weights [X] follow up per protocol  [ ] other: Source: Patient A&Ox4   Family [X]     RN [X]    Chart [X]    Reason for Follow-Up Assessment: Length Of Stay (Enteral Nutrition)    Per chart 37 y/o  34w4d pregnancy c/b IUGR3% UAD normal () p/w 2 days of inability to tolerate po. Ever since she contracted Covid 1.5 months ago patient has had progressive difficulty swallowing food. Pt reports a pureed diet for the past two weeks. Pt admitted for dysphagia workup. Continues to have poor PO intake with maternal weight loss and restricted fetal growth. Utilizing tube feeds, refusing overnight feeds.     Previous Dietitian Note , Pt A&Ox2 currently NPO receiving NGT feedings of Pivot 1.5 @25ml/hr x24 hr providing 600ml total formula, 900kcal, 56g pro. Current regimen not meeting estimated needs in addition to Pt refusing feeds and stopping them intermittently. Pt underwent EGD with no abnormalities per chart note . Per discussion with resident, Pt has provider to RN order allowing soft foods, amenable to changing NPO status to puree diet. After discussion with resident and Pt, both amenable to trialing calorie dense formula to provide increased calories over shorter period of time. Pt does not want feeds overnight, prefers to have them during the day started @11am. Pt also requesting PO supplement, multiple ensure clear at bedside untouched. Recommend Twocal HN @ 30ml/hr increasing 10ml Q4 hr to goal rate of 60ml/hr x12 hrs to provide 720ml, 1440kcal, 60g pro, 504ml free water from formula. TF does not fully meet estimated needs as Pt wants to eat/drink by mouth and requesting PO supplement + puree diet.     Patient was seen at bedside with . Tube feed of Twocal was running at 60 ml/hr x 12 hours (providing 720 mL, 1440kcal, 60g protein). Discussed tube feeding options for discharge, patient would need to switch since current formula is backordered, dependant on what will be covered. Patient stated that she had GI issues with Pivot 1.5. Formula was changed to TwoCal which patient tolerates, yet she gets Acid reflux if she does not take her medication, but is tolerating better. Patient does not like puree foods and asked for a regular diet since she wants soup. No chewing or swallowing issues. Denies nausea or vomiting. No constipation or diarrhea.     Tube feeding for 12 hours does not cover all of patient's nutrient needs.     Diet, Regular (23 @ 08:30)    PO intake:  < 50% [X] 50-75% [ ]   % [ ]  other :    Anthropometrics: Height (cm): 167.7 ()  Weight (kg): 55.4 ()  BMI (kg/m2): 19.7 ()    Edema: +1 Left/Right Foot  Pressure Injuries: None    __________________ Pertinent Medications__________________   MEDICATIONS  (STANDING):  acetaminophen     Tablet .. 975 milliGRAM(s) Oral once  dextrose 5% + lactated ringers with potassium chloride 20 mEq/L 1000 milliLiter(s) (125 mL/Hr) IV Continuous <Continuous>  famotidine Injectable 20 milliGRAM(s) IV Push once  heparin   Injectable 5000 Unit(s) SubCutaneous every 12 hours  metoclopramide Injectable 10 milliGRAM(s) IV Push once  pantoprazole  Injectable 40 milliGRAM(s) IV Push every 12 hours  sodium chloride 0.9% 1000 milliLiter(s) (125 mL/Hr) IV Continuous <Continuous>    MEDICATIONS  (PRN):      __________________ Pertinent Labs__________________    Na134 mmol/L<L> Glu 99 mg/dL K+ 4.1 mmol/L Cr  0.47 mg/dL<L> BUN 5 mg/dL<L>  Phos 3.7 mg/dL  Alb 3.1 g/dL<L>      Needs Assessment:     Kcal in 3rd trimester of pregnancy = 2447 kcals     (1995kcal (35 kcal/kg /57 kg pre-pregnancy weight + 452 kcals for 3rd trimester)    Protein: 1.6 - 1.8 g/kg (91-102g pro)    Previous Nutrition Diagnosis: Severe malnutrition     Nutrition Diagnosis is [X] ongoing  [  ] resolved [  ] not applicable       Education:  [X] Given today        Type of education provided: Consuming adequate calories/protein and intake of enteral supplements.  [  ] Given on previous assessment by RD   [  ] Not applicable 2/2 cognitive deficit  [  ] Pt refusal of education offered   [  ] Not applicable 2/2 current prognosis  [  ] Not warranted at present      Recommendations:  1. Change Tube feed to Jevity 1.5 @ 70 mL/hr (Providing 840 mL, 1260 kcal, 54 g protein) or [employer] 1.4 @ 70 ml/hr (Providing 840 mL, 1176 kcal, 52 g protein)                   Start tube feed at 10 mL/hr and increase by 10 mL every 4 hours until goal rate    2. Continue Regular diet  3. Continue [employer] 1.4 2x daily  4. Consider prenatal liquid multivitamin   5. Encourage PO intake and honor food preferences as able  6. Please consult RD for any TF adjustments or calculations  7. Please recommend to Outpatient Dietitian on discharge to Follow-up with patient      Monitoring and Evaluation:   [X] Tolerance to diet prescription [X] weights [X] follow up per protocol  [ ] other:

## 2023-08-23 NOTE — PROGRESS NOTE ADULT - SUBJECTIVE AND OBJECTIVE BOX
Antepartum Note, HD#    Interval events:    Patient seen and examined at bedside, no acute overnight events. No acute complaints. Pt reports +FM, denies LOF, VB, ctx, HA, epigastric pain, blurred vision, CP, SOB, N/V, fevers, and chills.    Vital Signs Last 24 Hours  T(C): 36.6 (08-23-23 @ 02:00), Max: 37 (08-22-23 @ 13:37)  HR: 73 (08-23-23 @ 02:00) (73 - 102)  BP: 105/66 (08-23-23 @ 02:00) (105/66 - 117/75)  RR: 17 (08-23-23 @ 02:00) (16 - 17)  SpO2: 99% (08-23-23 @ 02:00) (87% - 100%)    CAPILLARY BLOOD GLUCOSE          Physical Exam:  General: NAD  Abdomen: Soft, non-tender, gravid  Ext: No pain or swelling    NST reactive overnight    Labs:  08-22 @ 07:50    138  |  103  |  4   ----------------------------<  97  3.6   |  23  |  0.53    Ca    8.5      08-22 @ 07:50  Phos  3.2     08-22 @ 07:50  Mg     1.80     08-22 @ 07:50    TPro  6.0  /  Alb  2.9  /  TBili  0.3  /  DBili  x   /  AST  42  /  ALT  50  /  AlkPhos  129  08-22 @ 07:50            MEDICATIONS  (STANDING):  acetaminophen     Tablet .. 975 milliGRAM(s) Oral once  dextrose 5% + lactated ringers with potassium chloride 20 mEq/L 1000 milliLiter(s) (125 mL/Hr) IV Continuous <Continuous>  famotidine Injectable 20 milliGRAM(s) IV Push once  heparin   Injectable 5000 Unit(s) SubCutaneous every 12 hours  metoclopramide Injectable 10 milliGRAM(s) IV Push once  pantoprazole  Injectable 40 milliGRAM(s) IV Push every 12 hours  sodium chloride 0.9% 1000 milliLiter(s) (125 mL/Hr) IV Continuous <Continuous>    MEDICATIONS  (PRN):

## 2023-08-23 NOTE — PROGRESS NOTE ADULT - ASSESSMENT
39 y/o  35+0 pregnancy c/b IUGR3% UAD normal () p/w 2 days of inability to tolerate po. Ever since she contracted Covid 1.5 months ago patient has had progressive difficulty swallowing food. Pt reports a pureed diet for the past two weeks. Pt admitted for dysphagia workup. Utilizing tube feeds, tolerating a puree diet with limited oral intake.     #Dysphagia  - f/u AM CMP. Replete electrolytes as indicated.  - Recommended and strongly encouraged tube feeds to ensure caloric intake for patient. Nutrition following.   - GI work up as below. No identified cause found. Signed off.   - EGD with motility study (). Negative  - Barium esophagram (); Able to swallow contrast and barium pill without difficulty. No stricture or mass effect noted.   - EGD (): normal RAC pattern, with additional KO feeding tube placement  - Viral and auto immune labs without identified cause    - ENT following, preformed flexibles endoscope. No acute intervention. F/U outpatient   - SW saw patient. Recommending psychiatric evaluation.  - Psychiatric consult placed. Recommended anxiety medications, however patient declined.   - Daily weights. Admission weight 56.6kg, most recently 54kg   - Nutrition following. On continuous tube feeds (- )   - TPN consult (). Recommend tube feeds over TPN .    #Transaminitis/elevated lipase - Improving.   - AST/ALT: 111/123 -> 74/85->54/78->55/69->52/62->49/60->107/81->42/50  - Lipase 94  - RUQ sono (): WNL    # IUGR - severe   - ATU (): Breech, anterior, LIDIA 17.23, UAD   - ATU (8/15): Breech, anterior, LIDIA 10.8, 1787g (3%, AC 2%, HC 1%) UAD wnl   - Repeat growth 3-4 weeks from prior    #Fetal wellbeing  - NST BID  - BPP 2x/wk  - PNV    #Maternal wellbeing  - HSQ for DVT ppx  - C/w tube feeds per nutrition  - Case management planning.     S. Jackson-Schmidt PGY3

## 2023-08-24 LAB
ALBUMIN SERPL ELPH-MCNC: 2.9 G/DL — LOW (ref 3.3–5)
ALP SERPL-CCNC: 128 U/L — HIGH (ref 40–120)
ALT FLD-CCNC: 47 U/L — HIGH (ref 4–33)
ANION GAP SERPL CALC-SCNC: 14 MMOL/L — SIGNIFICANT CHANGE UP (ref 7–14)
AST SERPL-CCNC: 49 U/L — HIGH (ref 4–32)
BILIRUB SERPL-MCNC: 0.4 MG/DL — SIGNIFICANT CHANGE UP (ref 0.2–1.2)
BUN SERPL-MCNC: 4 MG/DL — LOW (ref 7–23)
CALCIUM SERPL-MCNC: 8.6 MG/DL — SIGNIFICANT CHANGE UP (ref 8.4–10.5)
CHLORIDE SERPL-SCNC: 103 MMOL/L — SIGNIFICANT CHANGE UP (ref 98–107)
CO2 SERPL-SCNC: 21 MMOL/L — LOW (ref 22–31)
CREAT SERPL-MCNC: 0.52 MG/DL — SIGNIFICANT CHANGE UP (ref 0.5–1.3)
EGFR: 123 ML/MIN/1.73M2 — SIGNIFICANT CHANGE UP
GLUCOSE SERPL-MCNC: 92 MG/DL — SIGNIFICANT CHANGE UP (ref 70–99)
POTASSIUM SERPL-MCNC: 3.6 MMOL/L — SIGNIFICANT CHANGE UP (ref 3.5–5.3)
POTASSIUM SERPL-SCNC: 3.6 MMOL/L — SIGNIFICANT CHANGE UP (ref 3.5–5.3)
PROT SERPL-MCNC: 6 G/DL — SIGNIFICANT CHANGE UP (ref 6–8.3)
SODIUM SERPL-SCNC: 138 MMOL/L — SIGNIFICANT CHANGE UP (ref 135–145)

## 2023-08-24 PROCEDURE — 71045 X-RAY EXAM CHEST 1 VIEW: CPT | Mod: 26,76

## 2023-08-24 RX ORDER — FERROUS SULFATE 325(65) MG
300 TABLET ORAL DAILY
Refills: 0 | Status: DISCONTINUED | OUTPATIENT
Start: 2023-08-24 | End: 2023-09-03

## 2023-08-24 RX ORDER — ACETAMINOPHEN 500 MG
1000 TABLET ORAL ONCE
Refills: 0 | Status: COMPLETED | OUTPATIENT
Start: 2023-08-24 | End: 2023-08-24

## 2023-08-24 RX ADMIN — HEPARIN SODIUM 5000 UNIT(S): 5000 INJECTION INTRAVENOUS; SUBCUTANEOUS at 10:12

## 2023-08-24 RX ADMIN — Medication 1000 MILLIGRAM(S): at 13:16

## 2023-08-24 RX ADMIN — Medication 400 MILLIGRAM(S): at 12:46

## 2023-08-24 RX ADMIN — HEPARIN SODIUM 5000 UNIT(S): 5000 INJECTION INTRAVENOUS; SUBCUTANEOUS at 22:58

## 2023-08-24 RX ADMIN — PANTOPRAZOLE SODIUM 40 MILLIGRAM(S): 20 TABLET, DELAYED RELEASE ORAL at 23:04

## 2023-08-24 RX ADMIN — DEXTROSE MONOHYDRATE, SODIUM CHLORIDE, AND POTASSIUM CHLORIDE 125 MILLILITER(S): 50; .745; 4.5 INJECTION, SOLUTION INTRAVENOUS at 10:12

## 2023-08-24 RX ADMIN — SODIUM CHLORIDE 125 MILLILITER(S): 9 INJECTION, SOLUTION INTRAVENOUS at 12:49

## 2023-08-24 RX ADMIN — DEXTROSE MONOHYDRATE, SODIUM CHLORIDE, AND POTASSIUM CHLORIDE 125 MILLILITER(S): 50; .745; 4.5 INJECTION, SOLUTION INTRAVENOUS at 05:55

## 2023-08-24 RX ADMIN — PANTOPRAZOLE SODIUM 40 MILLIGRAM(S): 20 TABLET, DELAYED RELEASE ORAL at 10:12

## 2023-08-24 NOTE — CHART NOTE - NSCHARTNOTEFT_GEN_A_CORE
Pathology results from EGD performed 8/18:    Final Diagnosis  1.  Lower esophagus, biopsy:  -   Squamous mucosa with no pathologic change.  No columnar mucosa  identified. Negative for eosinophilic esophagitis.    2.  Mid esophagus, biopsy:  -   Squamous mucosa with no pathologic change.  Negative for  eosinophilic esophagitis.  Verified by: Yoseph Tristan MD    Biopsy results are unremarkable. No plan for further GI work up at this time.    [place] Tong PGY6  GI/Hepatology Fellow

## 2023-08-24 NOTE — CHART NOTE - NSCHARTNOTEFT_GEN_A_CORE
NG tube became dislodged while patient washing up in restroom, while waiting previous X ray to confirm placement. NG tube advanced further.   NGT advanced without difficulty and secured in place.   Will repeat Xray to confirm placement at this time prior to use.       d/w PGY3 who will follow up read   Marta Griffiths FNP-BC

## 2023-08-24 NOTE — CHART NOTE - NSCHARTNOTEFT_GEN_A_CORE
R3 Eval Note    Patient seen at bedside for cramping, abdominal pain - put on monitor and tocometer with contractions q 5-6 minutes. Patient notes feeling uncomfortable at bedside, describing lower abdominal pain. Endorses good fetal movement, denies vaginal bleeding, leakage of fluid.     SVE: 0.5/0/-3    Continue to monitor on EFM given recurrent contractions.    d/w attending Dr Geraldo Oconnor  PGY3

## 2023-08-24 NOTE — PROGRESS NOTE ADULT - SUBJECTIVE AND OBJECTIVE BOX
Antepartum Note, HD#14    Interval events:    Patient seen and examined at bedside, no acute overnight events. Continues to report cramping and tightening. Pain 7/10 at these times.    Pt reports +FM, denies LOF, VB, HA, epigastric pain, blurred vision, CP, SOB, N/V, fevers, and chills.    Vital Signs Last 24 Hours  T(C): 36.6 (08-24-23 @ 05:29), Max: 36.9 (08-23-23 @ 17:19)  HR: 77 (08-24-23 @ 05:29) (73 - 94)  BP: 104/56 (08-24-23 @ 05:29) (99/54 - 127/68)  RR: 17 (08-24-23 @ 05:29) (16 - 17)  SpO2: 98% (08-24-23 @ 05:29) (98% - 100%)    Physical Exam:  General: NAD  Abdomen: mildly increased uterine tone, non-tender, gravid  Ext: No pain or swelling    NST reactive overnight, [place] quiet     Labs:  08-23 @ 10:35    134  |  102  |  5   ----------------------------<  99  4.1   |  25  |  0.47    Ca    8.7      08-23 @ 10:35  Phos  3.7     08-23 @ 10:35  Mg     1.90     08-23 @ 10:35    TPro  6.3  /  Alb  3.1  /  TBili  0.3  /  DBili  x   /  AST  37  /  ALT  46  /  AlkPhos  132  08-23 @ 10:35            MEDICATIONS  (STANDING):  acetaminophen     Tablet .. 975 milliGRAM(s) Oral once  dextrose 5% + lactated ringers with potassium chloride 20 mEq/L 1000 milliLiter(s) (125 mL/Hr) IV Continuous <Continuous>  famotidine Injectable 20 milliGRAM(s) IV Push once  heparin   Injectable 5000 Unit(s) SubCutaneous every 12 hours  metoclopramide Injectable 10 milliGRAM(s) IV Push once  pantoprazole  Injectable 40 milliGRAM(s) IV Push every 12 hours  sodium chloride 0.9% 1000 milliLiter(s) (125 mL/Hr) IV Continuous <Continuous>    MEDICATIONS  (PRN):  hydrocortisone 1% Cream 1 Application(s) Topical three times a day PRN Rash and/or Itching

## 2023-08-24 NOTE — PROGRESS NOTE ADULT - ASSESSMENT
39 y/o  35+1 pregnancy c/b IUGR3% UAD normal () p/w 2 days of inability to tolerate po. Ever since she contracted Covid 1.5 months ago patient has had progressive difficulty swallowing food. Pt reports a pureed diet for the past two weeks. Pt admitted for dysphagia workup. Utilizing tube feeds, tolerating a puree diet with limited oral intake. Reporting abdominal tightening overnight, VE overnight 0.5/0/-3.  [place] quiet this morning and NST reassuring.     #Dysphagia  - f/u AM CMP. Replete electrolytes as indicated.  - Recommended and strongly encouraged tube feeds to ensure caloric intake for patient. Nutrition following.   - GI work up as below. No identified cause found. Signed off.   - EGD with motility study (). Negative  - Barium esophagram (); Able to swallow contrast and barium pill without difficulty. No stricture or mass effect noted.   - EGD (): normal RAC pattern, with additional KO feeding tube placement  - Viral and auto immune labs without identified cause    - ENT following, preformed flexibles endoscope. No acute intervention. F/U outpatient   - SW saw patient. Recommending psychiatric evaluation.  - Psychiatric consult placed. Recommended anxiety medications, however patient declined.   - Daily weights. Admission weight 56.6kg, most recently 54kg   - Nutrition following. On continuous tube feeds (- )   - TPN consult (). Recommend tube feeds over TPN .    #Transaminitis/elevated lipase - Improving.   - AST/ALT: 111/123 -> 74/85->54/78->55/69->52/62->49/60->107/81->42/50  - Lipase 94  - RUQ sono (): WNL    # IUGR - severe   - ATU (): Breech, anterior, LIDIA 17.23, UAD   - ATU (8/15): Breech, anterior, LIDIA 10.8, 1787g (3%, AC 2%, HC 1%) UAD wnl   - Repeat growth 3-4 weeks from prior    #Fetal wellbeing  - NST BID  - BPP 2x/wk  - PNV    #Maternal wellbeing  - HSQ for DVT ppx  - C/w tube feeds per nutrition  - Case management planning.     S. Jackson-Schmidt PGY3

## 2023-08-24 NOTE — CHART NOTE - NSCHARTNOTEFT_GEN_A_CORE
NG tube removed due to inability to flush and clog despite multiple attempts. Per Dr. Martines replacement with weight NG tube preferable for home care. Weighted 12 Swazi Ng tube placed by Dr. Manzo [facility] fellow.  NGT advanced without difficulty and secured in place.   XR ordered and protocol to ensure correct placement before use of NG tube for feeds.     d/w Dr Mcgarry and seen with Dr Jovany Santiagolaurie [facility]

## 2023-08-25 ENCOUNTER — ASOB RESULT (OUTPATIENT)
Age: 38
End: 2023-08-25

## 2023-08-25 ENCOUNTER — APPOINTMENT (OUTPATIENT)
Dept: ANTEPARTUM | Facility: CLINIC | Age: 38
End: 2023-08-25
Payer: COMMERCIAL

## 2023-08-25 DIAGNOSIS — O99.613 DISEASES OF THE DIGESTIVE SYSTEM COMPLICATING PREGNANCY, THIRD TRIMESTER: ICD-10-CM

## 2023-08-25 DIAGNOSIS — O36.5990 MATERNAL CARE FOR OTHER KNOWN OR SUSPECTED POOR FETAL GROWTH, UNSPECIFIED TRIMESTER, NOT APPLICABLE OR UNSPECIFIED: ICD-10-CM

## 2023-08-25 DIAGNOSIS — O09.293 SUPERVISION OF PREGNANCY WITH OTHER POOR REPRODUCTIVE OR OBSTETRIC HISTORY, THIRD TRIMESTER: ICD-10-CM

## 2023-08-25 LAB
ALBUMIN SERPL ELPH-MCNC: 2.8 G/DL — LOW (ref 3.3–5)
ALP SERPL-CCNC: 125 U/L — HIGH (ref 40–120)
ALT FLD-CCNC: 50 U/L — HIGH (ref 4–33)
ANION GAP SERPL CALC-SCNC: 10 MMOL/L — SIGNIFICANT CHANGE UP (ref 7–14)
AST SERPL-CCNC: 57 U/L — HIGH (ref 4–32)
BASOPHILS # BLD AUTO: 0.04 K/UL — SIGNIFICANT CHANGE UP (ref 0–0.2)
BASOPHILS NFR BLD AUTO: 0.8 % — SIGNIFICANT CHANGE UP (ref 0–2)
BILIRUB SERPL-MCNC: 0.5 MG/DL — SIGNIFICANT CHANGE UP (ref 0.2–1.2)
BLD GP AB SCN SERPL QL: NEGATIVE — SIGNIFICANT CHANGE UP
BUN SERPL-MCNC: 4 MG/DL — LOW (ref 7–23)
CALCIUM SERPL-MCNC: 8.5 MG/DL — SIGNIFICANT CHANGE UP (ref 8.4–10.5)
CHLORIDE SERPL-SCNC: 104 MMOL/L — SIGNIFICANT CHANGE UP (ref 98–107)
CO2 SERPL-SCNC: 24 MMOL/L — SIGNIFICANT CHANGE UP (ref 22–31)
CREAT SERPL-MCNC: 0.54 MG/DL — SIGNIFICANT CHANGE UP (ref 0.5–1.3)
EGFR: 122 ML/MIN/1.73M2 — SIGNIFICANT CHANGE UP
EOSINOPHIL # BLD AUTO: 0.11 K/UL — SIGNIFICANT CHANGE UP (ref 0–0.5)
EOSINOPHIL NFR BLD AUTO: 2.2 % — SIGNIFICANT CHANGE UP (ref 0–6)
GLUCOSE SERPL-MCNC: 78 MG/DL — SIGNIFICANT CHANGE UP (ref 70–99)
HCT VFR BLD CALC: 27.8 % — LOW (ref 34.5–45)
HGB BLD-MCNC: 8.7 G/DL — LOW (ref 11.5–15.5)
IANC: 2.93 K/UL — SIGNIFICANT CHANGE UP (ref 1.8–7.4)
IMM GRANULOCYTES NFR BLD AUTO: 0.6 % — SIGNIFICANT CHANGE UP (ref 0–0.9)
LYMPHOCYTES # BLD AUTO: 1.49 K/UL — SIGNIFICANT CHANGE UP (ref 1–3.3)
LYMPHOCYTES # BLD AUTO: 29.2 % — SIGNIFICANT CHANGE UP (ref 13–44)
MAGNESIUM SERPL-MCNC: 1.9 MG/DL — SIGNIFICANT CHANGE UP (ref 1.6–2.6)
MCHC RBC-ENTMCNC: 26.8 PG — LOW (ref 27–34)
MCHC RBC-ENTMCNC: 31.3 GM/DL — LOW (ref 32–36)
MCV RBC AUTO: 85.5 FL — SIGNIFICANT CHANGE UP (ref 80–100)
MONOCYTES # BLD AUTO: 0.51 K/UL — SIGNIFICANT CHANGE UP (ref 0–0.9)
MONOCYTES NFR BLD AUTO: 10 % — SIGNIFICANT CHANGE UP (ref 2–14)
NEUTROPHILS # BLD AUTO: 2.93 K/UL — SIGNIFICANT CHANGE UP (ref 1.8–7.4)
NEUTROPHILS NFR BLD AUTO: 57.2 % — SIGNIFICANT CHANGE UP (ref 43–77)
NRBC # BLD: 0 /100 WBCS — SIGNIFICANT CHANGE UP (ref 0–0)
NRBC # FLD: 0 K/UL — SIGNIFICANT CHANGE UP (ref 0–0)
PLATELET # BLD AUTO: 156 K/UL — SIGNIFICANT CHANGE UP (ref 150–400)
POTASSIUM SERPL-MCNC: 3.9 MMOL/L — SIGNIFICANT CHANGE UP (ref 3.5–5.3)
POTASSIUM SERPL-SCNC: 3.9 MMOL/L — SIGNIFICANT CHANGE UP (ref 3.5–5.3)
PROT SERPL-MCNC: 5.7 G/DL — LOW (ref 6–8.3)
RBC # BLD: 3.25 M/UL — LOW (ref 3.8–5.2)
RBC # FLD: 15.6 % — HIGH (ref 10.3–14.5)
RH IG SCN BLD-IMP: POSITIVE — SIGNIFICANT CHANGE UP
SODIUM SERPL-SCNC: 138 MMOL/L — SIGNIFICANT CHANGE UP (ref 135–145)
WBC # BLD: 5.11 K/UL — SIGNIFICANT CHANGE UP (ref 3.8–10.5)
WBC # FLD AUTO: 5.11 K/UL — SIGNIFICANT CHANGE UP (ref 3.8–10.5)

## 2023-08-25 PROCEDURE — 76818 FETAL BIOPHYS PROFILE W/NST: CPT | Mod: 26

## 2023-08-25 PROCEDURE — 71045 X-RAY EXAM CHEST 1 VIEW: CPT | Mod: 26

## 2023-08-25 PROCEDURE — 76820 UMBILICAL ARTERY ECHO: CPT | Mod: 26

## 2023-08-25 RX ORDER — SODIUM CHLORIDE 9 MG/ML
1000 INJECTION, SOLUTION INTRAVENOUS ONCE
Refills: 0 | Status: COMPLETED | OUTPATIENT
Start: 2023-08-25 | End: 2023-08-25

## 2023-08-25 RX ADMIN — SODIUM CHLORIDE 1000 MILLILITER(S): 9 INJECTION, SOLUTION INTRAVENOUS at 08:18

## 2023-08-25 RX ADMIN — HEPARIN SODIUM 5000 UNIT(S): 5000 INJECTION INTRAVENOUS; SUBCUTANEOUS at 11:33

## 2023-08-25 RX ADMIN — PANTOPRAZOLE SODIUM 40 MILLIGRAM(S): 20 TABLET, DELAYED RELEASE ORAL at 11:32

## 2023-08-25 RX ADMIN — PANTOPRAZOLE SODIUM 40 MILLIGRAM(S): 20 TABLET, DELAYED RELEASE ORAL at 23:37

## 2023-08-25 RX ADMIN — SODIUM CHLORIDE 125 MILLILITER(S): 9 INJECTION, SOLUTION INTRAVENOUS at 11:37

## 2023-08-25 RX ADMIN — HEPARIN SODIUM 5000 UNIT(S): 5000 INJECTION INTRAVENOUS; SUBCUTANEOUS at 23:45

## 2023-08-25 NOTE — CHART NOTE - NSCHARTNOTEFT_GEN_A_CORE
Att:  Pt seen today to discuss plan for discharge vs remain hospitalized until delivery. Att:  Pt seen today to discuss plan for discharge vs remain hospitalized until delivery. The pt, her , her sister-in-law and myself all agree that pt would be best suited to remain hospitalized until delivery at 37 weeks. Since fetus is breech presentation currently, plan will be made for CS 9/7 or 9/8. Discussed if vtx, would try for vaginal delivery with the knowledge that fetus might not tolerate labor. Charlene Mcgarry MD

## 2023-08-25 NOTE — PROGRESS NOTE ADULT - ASSESSMENT
37 y/o  35+2 pregnancy c/b IUGR3% UAD normal () p/w 2 days of inability to tolerate po. Ever since she contracted Covid 1.5 months ago patient has had progressive difficulty swallowing food. Pt reports a pureed diet for the past two weeks. Pt admitted for dysphagia workup. Utilizing tube feeds, tolerating a puree diet with limited oral intake. Reporting abdominal tightening overnight, VE overnight 0.5/0/-3.  [place] quiet this morning and NST reassuring.     #Dysphagia  - f/u AM CMP. Replete electrolytes as indicated.  - Recommended and strongly encouraged tube feeds to ensure caloric intake for patient. Nutrition following.   - GI work up as below. No identified cause found. Signed off.   - EGD with motility study (). Negative  - Barium esophagram (); Able to swallow contrast and barium pill without difficulty. No stricture or mass effect noted.   - EGD (): normal RAC pattern, with additional KO feeding tube placement  - Viral and auto immune labs without identified cause    - ENT following, preformed flexibles endoscope. No acute intervention. F/U outpatient   - SW saw patient. Recommending psychiatric evaluation.  - Psychiatric consult placed. Recommended anxiety medications, however patient declined.   - Daily weights. Admission weight 56.6kg, most recently 54kg   - Nutrition following. On continuous tube feeds (- )   - TPN consult (). Recommend tube feeds over TPN .  - Weight NGT tube placed in anticipation for home disposition  - CXR(): NG tube re placement- previous was dislodged.  - Will advance and follow-up.    #Transaminitis/elevated lipase - Improving.   - AST/ALT: 111/123 -> 74/85->54/78->55/69->52/62->49/60->107/81->42/50  - Lipase 94  - RUQ sono (): WNL    # IUGR - severe   - ATU (): Breech, anterior, LIDIA 17.23, UAD   - ATU (8/15): Breech, anterior, LIDIA 10.8, 1787g (3%, AC 2%, HC 1%) UAD wnl   - Repeat growth 3-4 weeks from prior    #Fetal wellbeing  - NST BID  - BPP 2x/wk  - PNV    #Maternal wellbeing  - HSQ for DVT ppx  - C/w tube feeds per nutrition  - Case management planning.     Amyeo Afroz Jereen, PGY-3     37 y/o  35+2 pregnancy c/b IUGR3% UAD normal () p/w 2 days of inability to tolerate po. Ever since she contracted Covid 1.5 months ago patient has had progressive difficulty swallowing food. Pt reports a pureed diet for the past two weeks. Pt admitted for dysphagia workup. Utilizing tube feeds, tolerating a puree diet with limited oral intake. Reporting abdominal tightening overnight, VE overnight 0.5/0/-3.  [place] quiet this morning and NST reassuring.     #Dysphagia  - f/u AM CMP. Replete electrolytes as indicated.  - Recommended and strongly encouraged tube feeds to ensure caloric intake for patient. Nutrition following.   - GI work up as below. No identified cause found. Signed off.   - EGD with motility study (). Negative  - Barium esophagram (); Able to swallow contrast and barium pill without difficulty. No stricture or mass effect noted.   - EGD (): normal RAC pattern, with additional KO feeding tube placement  - Viral and auto immune labs without identified cause    - ENT following, preformed flexibles endoscope. No acute intervention. F/U outpatient   - SW saw patient. Recommending psychiatric evaluation.  - Psychiatric consult placed. Recommended anxiety medications, however patient declined.   - Daily weights. Admission weight 56.6kg, most recently 54kg   - Nutrition following. On continuous tube feeds (- )   - TPN consult (). Recommend tube feeds over TPN .  - Weight NGT tube placed in anticipation for home disposition  - CXR(): NG tube re placement- previous was dislodged.  - Will advance and follow-up.    #Transaminitis/elevated lipase - Improving.   - AST/ALT: 111/123 -> 74/85->54/78->55/69->52/62->49/60->107/81->42/50->37/46->49/47  - Lipase 94  - RUQ sono (): WNL    # IUGR - severe   - ATU (): Breech, anterior, LIDIA 17.23, UAD   - ATU (8/15): Breech, anterior, LIDIA 10.8, 1787g (3%, AC 2%, HC 1%) UAD wnl   - Repeat growth 3-4 weeks from prior    #Fetal wellbeing  - NST BID  - BPP 2x/wk  - PNV    #Maternal wellbeing  - HSQ for DVT ppx  - C/w tube feeds per nutrition  - Case management planning.     Amyeo Afroz Jereen, PGY-3

## 2023-08-25 NOTE — PROGRESS NOTE ADULT - SUBJECTIVE AND OBJECTIVE BOX
R3 Antepartum Note, HD#15    Patient seen and examined at bedside, no acute overnight events. Continues to report cramping and tightening. Pain 7/10 at these times.   Pt reports +FM, denies LOF, VB, HA, epigastric pain, blurred vision, CP, SOB, N/V, fevers, and chills.    Interval events:    Weighted NGT placed, then replaced after confirmation on imaging due to dislodgment.    Vital Signs Last 24 Hours  T(C): 36.7 (08-25-23 @ 05:56), Max: 36.8 (08-24-23 @ 10:08)  HR: 71 (08-25-23 @ 05:56) (58 - 108)  BP: 106/57 (08-25-23 @ 05:56) (106/57 - 119/77)  RR: 17 (08-25-23 @ 05:56) (16 - 17)  SpO2: 98% (08-25-23 @ 05:56) (95% - 100%)    CAPILLARY BLOOD GLUCOSE          Physical Exam:  General: NAD  Abdomen: Soft, non-tender, gravid  Ext: No pain or swelling    NST reactive overnight    Labs:  08-24 @ 06:40    138  |  103  |  4   ----------------------------<  92  3.6   |  21  |  0.52    Ca    8.6      08-24 @ 06:40  Phos  3.7     08-23 @ 10:35  Mg     1.90     08-23 @ 10:35    TPro  6.0  /  Alb  2.9  /  TBili  0.4  /  DBili  x   /  AST  49  /  ALT  47  /  AlkPhos  128  08-24 @ 06:40            MEDICATIONS  (STANDING):  acetaminophen     Tablet .. 975 milliGRAM(s) Oral once  famotidine Injectable 20 milliGRAM(s) IV Push once  ferrous    sulfate Liquid 300 milliGRAM(s) Enteral Tube daily  heparin   Injectable 5000 Unit(s) SubCutaneous every 12 hours  lactated ringers Bolus 1000 milliLiter(s) IV Bolus once  metoclopramide Injectable 10 milliGRAM(s) IV Push once  pantoprazole  Injectable 40 milliGRAM(s) IV Push every 12 hours  sodium chloride 0.9% 1000 milliLiter(s) (125 mL/Hr) IV Continuous <Continuous>    MEDICATIONS  (PRN):  hydrocortisone 1% Cream 1 Application(s) Topical three times a day PRN Rash and/or Itching

## 2023-08-25 NOTE — CHART NOTE - NSCHARTNOTEFT_GEN_A_CORE
S: RN informed me regarding patient complaint of feeling food be stuck in her throat after puree intake. Patient complaining it feels like her is high and point to trachea and epigastric area. Denies pain. Denies difficult breathing. Does not desires vitals sign check and is denying going on fetal monitoring for fetal assessment. Requesting CXR to ensure nothing is stuck in her throat.    Of note, NGT was changed from weighted to standard NGT due to epigastric discomfort with weighted NGT initially placed yesterday for discharge planning.    O: ICU Vital Signs Last 24 Hrs  T(C): 36.5 (25 Aug 2023 17:09), Max: 36.7 (25 Aug 2023 05:56)  T(F): 97.7 (25 Aug 2023 17:09), Max: 98.1 (25 Aug 2023 05:56)  HR: 71 (25 Aug 2023 17:09) (66 - 86)  BP: 109/65 (25 Aug 2023 17:09) (106/57 - 119/76)  RR: 17 (25 Aug 2023 17:09) (16 - 17)  SpO2: 100% (25 Aug 2023 17:09) (97% - 100%)    O2 Parameters below as of 25 Aug 2023 17:09  Patient On (Oxygen Delivery Method): room air    Bedside pulse 83 bmp. Dopplers (allowed read for a few seconds):       Abd: soft, non-tender, gravid  Ext: warm, well perfused    A/P: 39 y/o  35+2 pregnancy c/b IUGR3% UAD normal () p/w 2 days of inability to tolerate po s/p NGT with confirmation of appropriate placement via CXR:    - Will order repeat CXR per patient request.  - Pt declining further assessment, declining anxiolytic.  - Will follow-up CXR, cont to monitor PRN    Amyeo Afroz Jereen, PGY-3  Dr Urbina made aware. S: RN informed me regarding patient complaint of feeling food be stuck in her throat after puree intake. Patient complaining it feels like her is high and point to trachea and epigastric area. Denies pain. Denies difficult breathing. Does not desires vitals sign check and is denying going on fetal monitoring for fetal assessment. Requesting CXR to ensure nothing is stuck in her throat.    Of note, NGT was changed from weighted to standard NGT due to epigastric discomfort with weighted NGT initially placed yesterday for discharge planning.    O: ICU Vital Signs Last 24 Hrs  T(C): 36.5 (25 Aug 2023 17:09), Max: 36.7 (25 Aug 2023 05:56)  T(F): 97.7 (25 Aug 2023 17:09), Max: 98.1 (25 Aug 2023 05:56)  HR: 71 (25 Aug 2023 17:09) (66 - 86)  BP: 109/65 (25 Aug 2023 17:09) (106/57 - 119/76)  RR: 17 (25 Aug 2023 17:09) (16 - 17)  SpO2: 100% (25 Aug 2023 17:09) (97% - 100%)    O2 Parameters below as of 25 Aug 2023 17:09  Patient On (Oxygen Delivery Method): room air    Bedside pulse 83 bmp. Dopplers (allowed read for a few seconds): 130s bmp      Abd: soft, non-tender, gravid  Ext: warm, well perfused    A/P: 39 y/o  35+2 pregnancy c/b IUGR3% UAD normal () p/w 2 days of inability to tolerate po s/p NGT with confirmation of appropriate placement via CXR:    - Will order repeat CXR per patient request.  - Pt declining further assessment, declining anxiolytic.  - Will follow-up CXR, cont to monitor PRN    Amyeo Afroz Jereen, PGY-3  Dr Urbina made aware.

## 2023-08-26 LAB
ALBUMIN SERPL ELPH-MCNC: 3.1 G/DL — LOW (ref 3.3–5)
ALP SERPL-CCNC: 140 U/L — HIGH (ref 40–120)
ALT FLD-CCNC: 67 U/L — HIGH (ref 4–33)
ANION GAP SERPL CALC-SCNC: 14 MMOL/L — SIGNIFICANT CHANGE UP (ref 7–14)
AST SERPL-CCNC: 86 U/L — HIGH (ref 4–32)
BILIRUB SERPL-MCNC: 0.6 MG/DL — SIGNIFICANT CHANGE UP (ref 0.2–1.2)
BUN SERPL-MCNC: 5 MG/DL — LOW (ref 7–23)
CALCIUM SERPL-MCNC: 8.5 MG/DL — SIGNIFICANT CHANGE UP (ref 8.4–10.5)
CHLORIDE SERPL-SCNC: 101 MMOL/L — SIGNIFICANT CHANGE UP (ref 98–107)
CO2 SERPL-SCNC: 21 MMOL/L — LOW (ref 22–31)
CREAT SERPL-MCNC: 0.5 MG/DL — SIGNIFICANT CHANGE UP (ref 0.5–1.3)
EGFR: 125 ML/MIN/1.73M2 — SIGNIFICANT CHANGE UP
GLUCOSE SERPL-MCNC: 69 MG/DL — LOW (ref 70–99)
GRAM STN FLD: SIGNIFICANT CHANGE UP
POTASSIUM SERPL-MCNC: 3.2 MMOL/L — LOW (ref 3.5–5.3)
POTASSIUM SERPL-SCNC: 3.2 MMOL/L — LOW (ref 3.5–5.3)
PROT SERPL-MCNC: 6.3 G/DL — SIGNIFICANT CHANGE UP (ref 6–8.3)
SODIUM SERPL-SCNC: 136 MMOL/L — SIGNIFICANT CHANGE UP (ref 135–145)
SPECIMEN SOURCE: SIGNIFICANT CHANGE UP

## 2023-08-26 PROCEDURE — 99233 SBSQ HOSP IP/OBS HIGH 50: CPT | Mod: GC

## 2023-08-26 RX ORDER — FAMOTIDINE 10 MG/ML
20 INJECTION INTRAVENOUS ONCE
Refills: 0 | Status: COMPLETED | OUTPATIENT
Start: 2023-08-26 | End: 2023-08-26

## 2023-08-26 RX ORDER — POTASSIUM CHLORIDE 20 MEQ
20 PACKET (EA) ORAL ONCE
Refills: 0 | Status: COMPLETED | OUTPATIENT
Start: 2023-08-26 | End: 2023-08-27

## 2023-08-26 RX ADMIN — HEPARIN SODIUM 5000 UNIT(S): 5000 INJECTION INTRAVENOUS; SUBCUTANEOUS at 11:46

## 2023-08-26 RX ADMIN — SODIUM CHLORIDE 125 MILLILITER(S): 9 INJECTION, SOLUTION INTRAVENOUS at 17:06

## 2023-08-26 RX ADMIN — HEPARIN SODIUM 5000 UNIT(S): 5000 INJECTION INTRAVENOUS; SUBCUTANEOUS at 23:32

## 2023-08-26 RX ADMIN — FAMOTIDINE 20 MILLIGRAM(S): 10 INJECTION INTRAVENOUS at 04:26

## 2023-08-26 RX ADMIN — PANTOPRAZOLE SODIUM 40 MILLIGRAM(S): 20 TABLET, DELAYED RELEASE ORAL at 11:45

## 2023-08-26 RX ADMIN — PANTOPRAZOLE SODIUM 40 MILLIGRAM(S): 20 TABLET, DELAYED RELEASE ORAL at 22:50

## 2023-08-26 NOTE — PROGRESS NOTE ADULT - SUBJECTIVE AND OBJECTIVE BOX
R3 Antepartum Note    Patient seen and examined at bedside, no acute overnight events. Pt complaining of increased gas with new tube feed and requests a change in tube feed. Pt reports +FM, denies LOF, VB, ctx, HA, epigastric pain, blurred vision, CP, SOB, N/V, fevers, and chills. Continues to report same level of abdominal discomfort and throat tightening.    Vital Signs Last 24 Hours  T(C): 36.8 (08-26-23 @ 05:28), Max: 36.8 (08-26-23 @ 05:28)  HR: 77 (08-26-23 @ 05:28) (66 - 86)  BP: 111/68 (08-26-23 @ 05:28) (109/65 - 114/70)  RR: 17 (08-26-23 @ 05:28) (16 - 17)  SpO2: 100% (08-26-23 @ 05:28) (98% - 100%)    CAPILLARY BLOOD GLUCOSE    Physical Exam:  General: NAD  Abdomen: Soft, non-tender, gravid  Ext: No pain or swelling  Drains/Lines: NGT in place    NST reactive overnight    Labs:             8.7    5.11  )-----------( 156      ( 08-25 @ 09:36 )             27.8     08-25 @ 09:36    138  |  104  |  4   ----------------------------<  78  3.9   |  24  |  0.54    Ca    8.5      08-25 @ 09:36  Mg     1.90     08-25 @ 09:36    TPro  5.7  /  Alb  2.8  /  TBili  0.5  /  DBili  x   /  AST  57  /  ALT  50  /  AlkPhos  125  08-25 @ 09:36    MEDICATIONS  (STANDING):  acetaminophen     Tablet .. 975 milliGRAM(s) Oral once  famotidine Injectable 20 milliGRAM(s) IV Push once  ferrous    sulfate Liquid 300 milliGRAM(s) Enteral Tube daily  heparin   Injectable 5000 Unit(s) SubCutaneous every 12 hours  metoclopramide Injectable 10 milliGRAM(s) IV Push once  pantoprazole  Injectable 40 milliGRAM(s) IV Push every 12 hours  sodium chloride 0.9% 1000 milliLiter(s) (125 mL/Hr) IV Continuous <Continuous>    MEDICATIONS  (PRN):  hydrocortisone 1% Cream 1 Application(s) Topical three times a day PRN Rash and/or Itching

## 2023-08-26 NOTE — PROGRESS NOTE ADULT - ASSESSMENT
37 y/o  @ 35w3d admitted for inability to tolerate PO and workup of dysphagia for 2 months. Pregnancy c/b IUGR (3%, AC 2%, UAD wnl). Workup thus far unremarkable. Patient continues to report same baseline complaints of throat discomfort, now on pureed diet and tube feeds. Currently in stable condition.    #Dysphagia  - Workup unremarkable, recommending tube feeds  - GI: no identified cause, signed off  - EGD w/ motility study (): negative  - Barium esophagram (): able to swallow contrast and barium pill without difficulty, no stricture or mass effect noted  - EGD (): normal RAC pattern, with additional KO feeding tube placement  - Viral and auto immune labs without identified cause    - ENT: flexible endoscope unremarkable, no acute intervention, f/u  outpatient  - Daily weights: admission weight 56.6kg, most recently 55.4kg   - Nutrition: 12hr tube feeds (-)   - TPN consult (): recommend tube feeds over TPN  - NGT placement confirmed by multiple CXR    #Transaminitis  - Improving  - AST/ALT: 111/123 ->>52/62->>107/81->>57/50  - Lipase: 94  - RUQ sono (): WNL    # IUGR - severe   - ATU (): Breech, anterior, LIDIA 17.23, UAD   - ATU (8/15): Breech, anterior, LIDIA 10.8, 1787g (3%, AC 2%, HC 1%) UAD wnl   - Repeat growth 3-4 weeks from prior    #Fetal Wellbeing  - NST BID  - BPP 2x/wk  - PNV    #Maternal Wellbeing  - HSQ for DVT ppx  - SW: recommend psychiatric evaluation  - Psychiatry: recommend anxiety medication (pt declines)  - C/w tube feeds per nutrition  - Case management planning    Luma Oconnor  PGY3

## 2023-08-26 NOTE — PROVIDER CONTACT NOTE (CHANGE IN STATUS NOTIFICATION) - SITUATION
pt 35.6 wk with NG tube in place. on Jevity feeding formula. RN called into room due to itchy hives on knees. pt states she has hx of occasional reactions toward soy milk. pt refuse Benadryl. states "will just let the hives resolve on its own" pt requesting "TwoCal HN" formula instead.
35.3wk with NG tube placed. pt c/o waking up with blurred vision on the left eye. pt states slept on the left eye and had used cold water on the eyes due to blurry vision.

## 2023-08-26 NOTE — CONSULT NOTE ADULT - SUBJECTIVE AND OBJECTIVE BOX
[facility] DEPARTMENT OF OPHTHALMOLOGY - INITIAL ADULT CONSULT  ----------------------------------------------------------------------------------------------------  Navneet Silver MD, PGY-2  Available on teams  ----------------------------------------------------------------------------------------------------    HPI:  Admission H&P    Subjective  HPI: 39 y/o  33w3d presents for 2 days of complete inability to tolerate PO. +FM. -LOF. -CTXs. -VB. Pt denies any other concerns.    Patient states that after getting COVID about one month and a half ago she started having difficulty swallowing. States that she has seen multiple specialists including an ENT who stated that she had some mild irritation of the throat. Notes two days of inability to tolerate any oral intake. Notes 11 lb weight loss in the past two weeks. Denies nausea, vomiting, fevers, chills. States that she feels abdominal soreness.    – PNC: Denies prenatal issues. GBS unknown. EFW 1320g from .  – OBHx: MABx2 s/p D&Cx2, SABx1  – GynHx: denies  – PMH: denies  – PSH: D&Cx2  – Psych: denies   – Social: denies   – Meds: Omeprazole, Vitamins  – Allergies: NKDA  – Will accept blood transfusions? Yes    Objective  – Vital Signs  Vital Signs Last 24 Hrs  T(C): 36.9 (23 @ 23:34), Max: 36.9 (23 @ 23:34)  T(F): 98.4 (23 @ 23:34), Max: 98.4 (23 @ 23:34)  HR: 71 (23 @ 01:16) (71 - 99)  BP: 113/71 (23 @ 00:33) (113/71 - 131/91)  BP(mean): --  RR: 16 (23 @ 23:34) (16 - 16)  SpO2: 100% (23 @ 01:16) (88% - 100%)  – PE:   CV: RRR  Pulm: breathing comfortably on RA  Abd: gravid, nontender  Extr: moving all extremities with ease  – FHT: baseline 120, mod variability, +accels, -decels  – [place]: q10min  – EFW: 1320g by sono  – Sono: breech, anterior placenta (12 Aug 2023 00:17)    Interval History: Patient reports she was seeing clearly prior to a nap she took today, woke upon her left side and noticed left eye tearing and blurry vision, denies redness, photophobia, eye pain, diplopia, flashes, floaters or loss of vision. Denies headaches.     PAST MEDICAL & SURGICAL HISTORY:  GERD (gastroesophageal reflux disease)  Acid reflux  History of dilation and curettage        Past Ocular History: None  Ophthalmic Medications: None  FAMILY HISTORY:  Family history of diabetes mellitus (DM) (Mother)  Family history of hypertension (Mother, Father)  Family history of stroke (Aunt)      MEDICATIONS  (STANDING):  acetaminophen     Tablet .. 975 milliGRAM(s) Oral once  ferrous    sulfate Liquid 300 milliGRAM(s) Enteral Tube daily  heparin   Injectable 5000 Unit(s) SubCutaneous every 12 hours  pantoprazole  Injectable 40 milliGRAM(s) IV Push every 12 hours  potassium chloride  20 mEq/100 mL IVPB 20 milliEquivalent(s) IV Intermittent once  sodium chloride 0.9% 1000 milliLiter(s) (125 mL/Hr) IV Continuous <Continuous>    MEDICATIONS  (PRN):  hydrocortisone 1% Cream 1 Application(s) Topical three times a day PRN Rash and/or Itching    Allergies & Intolerances:   No Known Drug Allergies  shellfish (Hives)  Beef (Unknown)  Soy (Hives)    Review of Systems:  Constitutional: No fever, chills  Eyes: See HPI   Neuro: No tremors  Cardiovascular: No chest pain, palpitations  Respiratory: No SOB, no cough  GI: No nausea, vomiting, abdominal pain  : No dysuria  Skin: no rash  Psych: no depression  Endocrine: no polyuria, polydipsia  Heme/lymph: no swelling    VITALS: T(C): 36.8 (23 @ 09:39)  T(F): 98.3 (23 @ 09:39), Max: 98.3 (23 @ 05:28)  HR: 112 (23 @ 13:13) (71 - 112)  BP: 124/79 (23 @ 13:13) (109/65 - 124/79)  RR:  (17 - 17)  SpO2:  (99% - 100%)  Wt(kg): --  General: AAO x 3, appropriate mood and affect    Ophthalmology Exam:  Visual acuity (sc): 20/20 OD/OS  Pupils: PERRL OU, no APD  Ttono: 12 OD 14 OS  Extraocular movements (EOMs): Full OU, no pain, no diplopia  Confrontational Visual Field (CVF): Full OU  Color Plates: 12/12 OU    Pen Light Exam (PLE)  External: Flat OU  Lids/Lashes/Lacrimal Ducts: Flat OU    Sclera/Conjunctiva: W+Q OU  Cornea: DTBUT OD, OS: DTBUT, 2+ interpalpebral SPK, no epi defect or abrasion   Anterior Chamber: D+F OU    Iris: Flat OU  Lens: Cl OU    Fundus Exam: dilated with 1% tropicamide only  Approval obtained from primary team for dilation  Patient aware that pupils can remained dilated for at least 4-6 hours  Exam performed with 20D lens    Vitreous: wnl OU  Disc, cup/disc: sharp and pink, 0.2 OU  Macula: wnl OU  Vessels: wnl OU  Periphery: wnl OU     [facility] DEPARTMENT OF OPHTHALMOLOGY - INITIAL ADULT CONSULT  ----------------------------------------------------------------------------------------------------  Navneet Silver MD, PGY-2  Available on teams  ----------------------------------------------------------------------------------------------------    HPI:  Admission H&P    Subjective  HPI: 39 y/o  33w3d presents for 2 days of complete inability to tolerate PO. +FM. -LOF. -CTXs. -VB. Pt denies any other concerns.    Patient states that after getting COVID about one month and a half ago she started having difficulty swallowing. States that she has seen multiple specialists including an ENT who stated that she had some mild irritation of the throat. Notes two days of inability to tolerate any oral intake. Notes 11 lb weight loss in the past two weeks. Denies nausea, vomiting, fevers, chills. States that she feels abdominal soreness.    – PNC: Denies prenatal issues. GBS unknown. EFW 1320g from .  – OBHx: MABx2 s/p D&Cx2, SABx1  – GynHx: denies  – PMH: denies  – PSH: D&Cx2  – Psych: denies   – Social: denies   – Meds: Omeprazole, Vitamins  – Allergies: NKDA  – Will accept blood transfusions? Yes    Objective  – Vital Signs  Vital Signs Last 24 Hrs  T(C): 36.9 (23 @ 23:34), Max: 36.9 (23 @ 23:34)  T(F): 98.4 (23 @ 23:34), Max: 98.4 (23 @ 23:34)  HR: 71 (23 @ 01:16) (71 - 99)  BP: 113/71 (23 @ 00:33) (113/71 - 131/91)  BP(mean): --  RR: 16 (23 @ 23:34) (16 - 16)  SpO2: 100% (23 @ 01:16) (88% - 100%)  – PE:   CV: RRR  Pulm: breathing comfortably on RA  Abd: gravid, nontender  Extr: moving all extremities with ease  – FHT: baseline 120, mod variability, +accels, -decels  – [place]: q10min  – EFW: 1320g by sono  – Sono: breech, anterior placenta (12 Aug 2023 00:17)    Interval History: Patient reports she was seeing clearly prior to a nap she took today, woke upon her left side and noticed left eye tearing and blurry vision, denies redness, photophobia, eye pain, diplopia, flashes, floaters or loss of vision. Denies headaches.     PAST MEDICAL & SURGICAL HISTORY:  GERD (gastroesophageal reflux disease)  Acid reflux  History of dilation and curettage        Past Ocular History: None  Ophthalmic Medications: None  FAMILY HISTORY:  Family history of diabetes mellitus (DM) (Mother)  Family history of hypertension (Mother, Father)  Family history of stroke (Aunt)      MEDICATIONS  (STANDING):  acetaminophen     Tablet .. 975 milliGRAM(s) Oral once  ferrous    sulfate Liquid 300 milliGRAM(s) Enteral Tube daily  heparin   Injectable 5000 Unit(s) SubCutaneous every 12 hours  pantoprazole  Injectable 40 milliGRAM(s) IV Push every 12 hours  potassium chloride  20 mEq/100 mL IVPB 20 milliEquivalent(s) IV Intermittent once  sodium chloride 0.9% 1000 milliLiter(s) (125 mL/Hr) IV Continuous <Continuous>    MEDICATIONS  (PRN):  hydrocortisone 1% Cream 1 Application(s) Topical three times a day PRN Rash and/or Itching    Allergies & Intolerances:   No Known Drug Allergies  shellfish (Hives)  Beef (Unknown)  Soy (Hives)    Review of Systems:  Constitutional: No fever, chills  Eyes: See HPI   Neuro: No tremors  Cardiovascular: No chest pain, palpitations  Respiratory: No SOB, no cough  GI: No nausea, vomiting, abdominal pain  : No dysuria  Skin: no rash  Psych: no depression  Endocrine: no polyuria, polydipsia  Heme/lymph: no swelling    VITALS: T(C): 36.8 (23 @ 09:39)  T(F): 98.3 (23 @ 09:39), Max: 98.3 (23 @ 05:28)  HR: 112 (23 @ 13:13) (71 - 112)  BP: 124/79 (23 @ 13:13) (109/65 - 124/79)  RR:  (17 - 17)  SpO2:  (99% - 100%)  Wt(kg): --  General: AAO x 3, appropriate mood and affect    Ophthalmology Exam:  Visual acuity (sc): 20/20 OD/OS  Pupils: PERRL OU, no APD  Ttono: 12 OD 14 OS  Extraocular movements (EOMs): Full OU, no pain, no diplopia  Confrontational Visual Field (CVF): Full OU  Color Plates: 12/12 OU    Pen Light Exam (PLE)  External: Flat OU  Lids/Lashes/Lacrimal Ducts: Flat OU    Sclera/Conjunctiva: W+Q OU  Cornea: DTBUT OD, OS: DTBUT, 2+ interpalpebral SPK, no epi defect or abrasion   Anterior Chamber: D+F OU    Iris: Flat OU  Lens: Cl OU    Fundus Exam: dilated with 1% tropicamide only  Approval obtained from primary team for dilation  Patient aware that pupils can remained dilated for at least 4-6 hours  Exam performed with 20D lens    Vitreous: wnl OU  Disc, cup/disc: sharp and pink, 0.2 OU  Macula: wnl OU  Vessels: wnl OU  Periphery: wnl OU     [facility] DEPARTMENT OF OPHTHALMOLOGY - INITIAL ADULT CONSULT  ----------------------------------------------------------------------------------------------------  Navneet Silver MD, PGY-2  Available on teams  ----------------------------------------------------------------------------------------------------    HPI:  Admission H&P    Subjective  HPI: 37 y/o  33w3d presents for 2 days of complete inability to tolerate PO. +FM. -LOF. -CTXs. -VB. Pt denies any other concerns.    Patient states that after getting COVID about one month and a half ago she started having difficulty swallowing. States that she has seen multiple specialists including an ENT who stated that she had some mild irritation of the throat. Notes two days of inability to tolerate any oral intake. Notes 11 lb weight loss in the past two weeks. Denies nausea, vomiting, fevers, chills. States that she feels abdominal soreness.    – PNC: Denies prenatal issues. GBS unknown. EFW 1320g from .  – OBHx: MABx2 s/p D&Cx2, SABx1  – GynHx: denies  – PMH: denies  – PSH: D&Cx2  – Psych: denies   – Social: denies   – Meds: Omeprazole, Vitamins  – Allergies: NKDA  – Will accept blood transfusions? Yes    Objective  – Vital Signs  Vital Signs Last 24 Hrs  T(C): 36.9 (23 @ 23:34), Max: 36.9 (23 @ 23:34)  T(F): 98.4 (23 @ 23:34), Max: 98.4 (23 @ 23:34)  HR: 71 (23 @ 01:16) (71 - 99)  BP: 113/71 (23 @ 00:33) (113/71 - 131/91)  BP(mean): --  RR: 16 (23 @ 23:34) (16 - 16)  SpO2: 100% (23 @ 01:16) (88% - 100%)  – PE:   CV: RRR  Pulm: breathing comfortably on RA  Abd: gravid, nontender  Extr: moving all extremities with ease  – FHT: baseline 120, mod variability, +accels, -decels  – [place]: q10min  – EFW: 1320g by sono  – Sono: breech, anterior placenta (12 Aug 2023 00:17)    Interval History: Patient reports she was seeing clearly prior to a nap she took today, woke upon her left side and noticed left eye tearing and blurry vision, denies redness, photophobia, eye pain, diplopia, flashes, floaters or loss of vision. Denies headaches.     PAST MEDICAL & SURGICAL HISTORY:  GERD (gastroesophageal reflux disease)  Acid reflux  History of dilation and curettage        Past Ocular History: None  Ophthalmic Medications: None  FAMILY HISTORY:  Family history of diabetes mellitus (DM) (Mother)  Family history of hypertension (Mother, Father)  Family history of stroke (Aunt)      MEDICATIONS  (STANDING):  acetaminophen     Tablet .. 975 milliGRAM(s) Oral once  ferrous    sulfate Liquid 300 milliGRAM(s) Enteral Tube daily  heparin   Injectable 5000 Unit(s) SubCutaneous every 12 hours  pantoprazole  Injectable 40 milliGRAM(s) IV Push every 12 hours  potassium chloride  20 mEq/100 mL IVPB 20 milliEquivalent(s) IV Intermittent once  sodium chloride 0.9% 1000 milliLiter(s) (125 mL/Hr) IV Continuous <Continuous>    MEDICATIONS  (PRN):  hydrocortisone 1% Cream 1 Application(s) Topical three times a day PRN Rash and/or Itching    Allergies & Intolerances:   No Known Drug Allergies  shellfish (Hives)  Beef (Unknown)  Soy (Hives)    Review of Systems:  Constitutional: No fever, chills  Eyes: See HPI   Neuro: No tremors  Cardiovascular: No chest pain, palpitations  Respiratory: No SOB, no cough  GI: No nausea, vomiting, abdominal pain  : No dysuria  Skin: no rash  Psych: no depression  Endocrine: no polyuria, polydipsia  Heme/lymph: no swelling    VITALS: T(C): 36.8 (23 @ 09:39)  T(F): 98.3 (23 @ 09:39), Max: 98.3 (23 @ 05:28)  HR: 112 (23 @ 13:13) (71 - 112)  BP: 124/79 (23 @ 13:13) (109/65 - 124/79)  RR:  (17 - 17)  SpO2:  (99% - 100%)  Wt(kg): --  General: AAO x 3, appropriate mood and affect    Ophthalmology Exam:  Visual acuity (sc): 20/20 OD/OS  Pupils: PERRL OU, no APD  Ttono: 12 OD 14 OS  Extraocular movements (EOMs): Full OU, no pain, no diplopia  Confrontational Visual Field (CVF): Full OU  Color Plates: 12/12 OU    Pen Light Exam (PLE)  External: Flat OU  Lids/Lashes/Lacrimal Ducts: Flat OU    Sclera/Conjunctiva: W+Q OU  Cornea: DTBUT OD, OS: DTBUT, 2+ interpalpebral SPK, no epi defect or abrasion   Anterior Chamber: D+F OU    Iris: Flat OU  Lens: Cl OU    Fundus Exam: dilated with 1% tropicamide only  Approval obtained from primary team for dilation  Patient aware that pupils can remained dilated for at least 4-6 hours  Exam performed with 20D lens    Vitreous: wnl OU  Disc, cup/disc: sharp and pink, 0.2 OU  Macula: wnl OU  Vessels: wnl OU  Periphery: wnl OU

## 2023-08-26 NOTE — PROGRESS NOTE ADULT - ATTENDING COMMENTS
Patient seen at bedside, agree with assessment and plan as stated above.  Continue inpatient monitoring.    Duncan Boogie MD

## 2023-08-26 NOTE — CONSULT NOTE ADULT - ASSESSMENT
39 y/o  @ 35w3d admitted for inability to tolerate PO and workup of dysphagia for 2 months, ophthalmology consulted for left eye blurry vision after patient woke up from a nap, found to have 2+ interpalpebral SPK OS.    # Dry Eye Syndrome, OS>OD  - Patient with no ocular history reported left eye tearing and blurry vision after waking up on left side from a nap, found to have 2+ interpalpebral SPK OS  - VA 20/20 OD/OS, IOP wnl OU, PERRLA no rAPD OU  - Anterior exam with DTBUT OU, and left eye with 2+ interpalpebral SPK  - Patient likely slept in a position causing left eye interpalpebral exposure causing dryness in interpalpebral distribution  - DFE wnl OU   - No other acute ophthalmic pathology  - Recommend artificial tears QID OU   - Recommend warm compresses for a few times per day 2-3x per day to both eyes    Outpatient Follow-up: Patient should follow-up with his/her ophthalmologist or with [facility] Department of Ophthalmology within 1 week of after discharge at:    [address]  [place] [address]  [phone] 39 y/o  @ 35w3d admitted for inability to tolerate PO and workup of dysphagia for 2 months, ophthalmology consulted for left eye blurry vision after patient woke up from a nap, found to have 2+ interpalpebral SPK OS.    # Dry Eye Syndrome, OS>OD  - Patient with no ocular history reported left eye tearing and blurry vision after waking up on left side from a nap, found to have 2+ interpalpebral SPK OS  - VA 20/20 OD/OS, IOP wnl OU, PERRLA no rAPD OU  - Anterior exam with DTBUT OU, and left eye with 2+ interpalpebral SPK  - Patient likely slept in a position causing left eye interpalpebral exposure causing dryness in interpalpebral distribution  - DFE wnl OU   - No other acute ophthalmic pathology  - Recommend artificial tears QID OU   - Recommend warm compresses for a few times per day 2-3x per day to both eyes    Outpatient Follow-up: Patient should follow-up with his/her ophthalmologist or with [facility] Department of Ophthalmology within 1 week of after discharge at:    [address]  [place] [address]  [phone] 39 y/o  @ 35w3d admitted for inability to tolerate PO and workup of dysphagia for 2 months, ophthalmology consulted for left eye blurry vision after patient woke up from a nap, found to have 2+ interpalpebral SPK OS.    # Dry Eye Syndrome, OS>OD  - Patient with no ocular history reported left eye tearing and blurry vision after waking up on left side from a nap, found to have 2+ interpalpebral SPK OS  - VA 20/20 OD/OS, IOP wnl OU, PERRLA no rAPD OU  - Anterior exam with DTBUT OU, and left eye with 2+ interpalpebral SPK  - Patient likely slept in a position causing left eye interpalpebral exposure causing dryness in interpalpebral distribution  - DFE wnl OU   - No other acute ophthalmic pathology  - Recommend artificial tears QID OU   - Recommend warm compresses for a few times per day 2-3x per day to both eyes    Outpatient Follow-up: Patient should follow-up with his/her ophthalmologist or with [facility] Department of Ophthalmology within 1 week of after discharge at:    [address]  [place] [address]  [phone]

## 2023-08-27 LAB
ALBUMIN SERPL ELPH-MCNC: 3.2 G/DL — LOW (ref 3.3–5)
ALP SERPL-CCNC: 149 U/L — HIGH (ref 40–120)
ALT FLD-CCNC: 82 U/L — HIGH (ref 4–33)
ANION GAP SERPL CALC-SCNC: 17 MMOL/L — HIGH (ref 7–14)
AST SERPL-CCNC: 101 U/L — HIGH (ref 4–32)
BILIRUB SERPL-MCNC: 0.6 MG/DL — SIGNIFICANT CHANGE UP (ref 0.2–1.2)
BUN SERPL-MCNC: 5 MG/DL — LOW (ref 7–23)
CALCIUM SERPL-MCNC: 8.6 MG/DL — SIGNIFICANT CHANGE UP (ref 8.4–10.5)
CHLORIDE SERPL-SCNC: 101 MMOL/L — SIGNIFICANT CHANGE UP (ref 98–107)
CO2 SERPL-SCNC: 18 MMOL/L — LOW (ref 22–31)
CREAT SERPL-MCNC: 0.54 MG/DL — SIGNIFICANT CHANGE UP (ref 0.5–1.3)
EGFR: 122 ML/MIN/1.73M2 — SIGNIFICANT CHANGE UP
GLUCOSE SERPL-MCNC: 66 MG/DL — LOW (ref 70–99)
POTASSIUM SERPL-MCNC: 3.5 MMOL/L — SIGNIFICANT CHANGE UP (ref 3.5–5.3)
POTASSIUM SERPL-SCNC: 3.5 MMOL/L — SIGNIFICANT CHANGE UP (ref 3.5–5.3)
PROT SERPL-MCNC: 6.4 G/DL — SIGNIFICANT CHANGE UP (ref 6–8.3)
SODIUM SERPL-SCNC: 136 MMOL/L — SIGNIFICANT CHANGE UP (ref 135–145)

## 2023-08-27 PROCEDURE — 99233 SBSQ HOSP IP/OBS HIGH 50: CPT | Mod: GC

## 2023-08-27 RX ADMIN — Medication 1 DROP(S): at 17:18

## 2023-08-27 RX ADMIN — HEPARIN SODIUM 5000 UNIT(S): 5000 INJECTION INTRAVENOUS; SUBCUTANEOUS at 12:00

## 2023-08-27 RX ADMIN — SODIUM CHLORIDE 125 MILLILITER(S): 9 INJECTION, SOLUTION INTRAVENOUS at 23:11

## 2023-08-27 RX ADMIN — Medication 50 MILLIEQUIVALENT(S): at 00:03

## 2023-08-27 RX ADMIN — PANTOPRAZOLE SODIUM 40 MILLIGRAM(S): 20 TABLET, DELAYED RELEASE ORAL at 11:58

## 2023-08-27 RX ADMIN — HEPARIN SODIUM 5000 UNIT(S): 5000 INJECTION INTRAVENOUS; SUBCUTANEOUS at 23:11

## 2023-08-27 RX ADMIN — Medication 1 DROP(S): at 11:59

## 2023-08-27 RX ADMIN — PANTOPRAZOLE SODIUM 40 MILLIGRAM(S): 20 TABLET, DELAYED RELEASE ORAL at 23:11

## 2023-08-27 NOTE — PROGRESS NOTE ADULT - ASSESSMENT
39 y/o  @ 35w4d admitted for inability to tolerate PO and workup of dysphagia for 2 months. Pregnancy c/b IUGR (3%, AC 2%, UAD wnl). Workup thus far unremarkable. Patient continues to report same baseline complaints of throat discomfort, now on pureed diet and tube feeds. Currently in stable condition.    #Dysphagia  - Workup unremarkable, recommending tube feeds  - GI: no identified cause, signed off  - EGD w/ motility study (): negative  - Barium esophagram (): able to swallow contrast and barium pill without difficulty, no stricture or mass effect noted  - EGD (): normal RAC pattern, with additional KO feeding tube placement  - Viral and auto immune labs without identified cause    - ENT: flexible endoscope unremarkable, no acute intervention, f/u  outpatient  - Daily weights: admission weight 56.6kg, most recently 55.4kg   - Nutrition: 12hr tube feeds (-)   - TPN consult (): recommend tube feeds over TPN  - NGT placement confirmed by multiple CXR    #Transaminitis  - Improving  - AST/ALT: 111/123 ->>52/62->>107/81->>57/50->86/67, f/u AM CMP  - Lipase: 94  - RUQ sono (): WNL    # IUGR - severe   - ATU (): Breech, anterior, LIDIA 17.23, UAD   - ATU (8/15): Breech, anterior, LIDIA 10.8, 1787g (3%, AC 2%, HC 1%) UAD wnl   - Repeat growth 3-4 weeks from prior    #Fetal Wellbeing  - NST BID  - BPP 2x/wk  - PNV    #Maternal Wellbeing  - HSQ for DVT ppx  - SW: recommend psychiatric evaluation  - Psychiatry: recommend anxiety medication (pt declines)  - C/w tube feeds per nutrition  - Case management planning    Amyeo Afroz Jereen, PGY-3     39 y/o  @ 35w4d admitted for inability to tolerate PO and workup of dysphagia for 2 months. Pregnancy c/b IUGR (3%, AC 2%, UAD wnl). Workup thus far unremarkable. Patient continues to report same baseline complaints of throat discomfort, now on pureed diet and tube feeds. Currently in stable condition.    #Dysphagia  - Workup unremarkable, recommending tube feeds  - GI: no identified cause, signed off  - EGD w/ motility study (): negative  - Barium esophagram (): able to swallow contrast and barium pill without difficulty, no stricture or mass effect noted  - EGD (): normal RAC pattern, with additional KO feeding tube placement  - Viral and auto immune labs without identified cause    - ENT: flexible endoscope unremarkable, no acute intervention, f/u  outpatient  - Daily weights: admission weight 56.6kg, most recently 55.4kg   - Nutrition: 12hr tube feeds (-)   - TPN consult (): recommend tube feeds over TPN  - NGT placement confirmed by multiple CXR  - if vision changes return, obtain neuro consult    #Transaminitis  - Improving  - AST/ALT: 111/123 ->>52/62->>107/81->>57/50->86/67, f/u AM CMP  - Lipase: 94  - RUQ sono (): WNL    # IUGR - severe   - ATU (): Breech, anterior, LIDIA 17.23, UAD   - ATU (8/15): Breech, anterior, LIDIA 10.8, 1787g (3%, AC 2%, HC 1%) UAD wnl   - Repeat growth 3-4 weeks from prior    #Fetal Wellbeing  - NST BID  - BPP 2x/wk  - PNV    #Maternal Wellbeing  - HSQ for DVT ppx  - SW: recommend psychiatric evaluation  - Psychiatry: recommend anxiety medication (pt declines)  - C/w tube feeds per nutrition  - Case management planning    Amyeo Afroz Jereen, PGY-3

## 2023-08-27 NOTE — PROGRESS NOTE ADULT - SUBJECTIVE AND OBJECTIVE BOX
R3 Antepartum Note, HD#17    Patient seen and examined at bedside, no acute overnight events. Pt complaining of_ blurred vision,?   Pt reports +FM, denies LOF, VB, ctx, HA, epigastric pain,  CP, SOB, N/V, fevers, and chills.   Continues to report same level of abdominal discomfort and throat tightening.      Vital Signs Last 24 Hours  T(C): 36.6 (08-27-23 @ 01:24), Max: 36.9 (08-26-23 @ 18:32)  HR: 75 (08-27-23 @ 01:24) (74 - 112)  BP: 106/66 (08-27-23 @ 01:24) (106/66 - 124/79)  RR: 17 (08-27-23 @ 01:24) (17 - 18)  SpO2: 98% (08-27-23 @ 01:24) (98% - 100%)    CAPILLARY BLOOD GLUCOSE          Physical Exam:  General: NAD  Abdomen: Soft, non-tender, gravid  Ext: No pain or swelling    NST reactive overnight    Labs:             8.7    5.11  )-----------( 156      ( 08-25 @ 09:36 )             27.8     08-26 @ 05:30    136  |  101  |  5   ----------------------------<  69  3.2   |  21  |  0.50    Ca    8.5      08-26 @ 05:30  Mg     1.90     08-25 @ 09:36    TPro  6.3  /  Alb  3.1  /  TBili  0.6  /  DBili  x   /  AST  86  /  ALT  67  /  AlkPhos  140  08-26 @ 05:30            MEDICATIONS  (STANDING):  acetaminophen     Tablet .. 975 milliGRAM(s) Oral once  artificial tears (preservative free) Ophthalmic Solution 1 Drop(s) Both EYES four times a day  ferrous    sulfate Liquid 300 milliGRAM(s) Enteral Tube daily  heparin   Injectable 5000 Unit(s) SubCutaneous every 12 hours  pantoprazole  Injectable 40 milliGRAM(s) IV Push every 12 hours  sodium chloride 0.9% 1000 milliLiter(s) (125 mL/Hr) IV Continuous <Continuous>    MEDICATIONS  (PRN):  hydrocortisone 1% Cream 1 Application(s) Topical three times a day PRN Rash and/or Itching   R3 Antepartum Note, HD#17    Patient seen and examined at bedside, no acute overnight events. Patient reporting improved vision, no blurriness.  Continues to report some mild improvement in abdominal discomfort and throat tightening. Reports she was able to take sips of her mother's home made soap.  Pt reports +FM, denies LOF, VB, ctx, HA, epigastric pain,  CP, SOB, N/V, fevers, and chills.         Vital Signs Last 24 Hours  T(C): 36.6 (08-27-23 @ 01:24), Max: 36.9 (08-26-23 @ 18:32)  HR: 75 (08-27-23 @ 01:24) (74 - 112)  BP: 106/66 (08-27-23 @ 01:24) (106/66 - 124/79)  RR: 17 (08-27-23 @ 01:24) (17 - 18)  SpO2: 98% (08-27-23 @ 01:24) (98% - 100%)    CAPILLARY BLOOD GLUCOSE          Physical Exam:  General: NAD  Abdomen: Soft, non-tender, gravid  Ext: No pain or swelling    NST reactive overnight    Labs:             8.7    5.11  )-----------( 156      ( 08-25 @ 09:36 )             27.8     08-26 @ 05:30    136  |  101  |  5   ----------------------------<  69  3.2   |  21  |  0.50    Ca    8.5      08-26 @ 05:30  Mg     1.90     08-25 @ 09:36    TPro  6.3  /  Alb  3.1  /  TBili  0.6  /  DBili  x   /  AST  86  /  ALT  67  /  AlkPhos  140  08-26 @ 05:30            MEDICATIONS  (STANDING):  acetaminophen     Tablet .. 975 milliGRAM(s) Oral once  artificial tears (preservative free) Ophthalmic Solution 1 Drop(s) Both EYES four times a day  ferrous    sulfate Liquid 300 milliGRAM(s) Enteral Tube daily  heparin   Injectable 5000 Unit(s) SubCutaneous every 12 hours  pantoprazole  Injectable 40 milliGRAM(s) IV Push every 12 hours  sodium chloride 0.9% 1000 milliLiter(s) (125 mL/Hr) IV Continuous <Continuous>    MEDICATIONS  (PRN):  hydrocortisone 1% Cream 1 Application(s) Topical three times a day PRN Rash and/or Itching

## 2023-08-27 NOTE — PROGRESS NOTE ADULT - ATTENDING COMMENTS
Patient seen and examined at bedside, agree with assessment and plan as stated above  Was able to tolerate soup and water PO yesterday, also doing better with tube feeds  Seen by opthalmology yesterday after complaint of blurring/loss of vision in eye after waking up, recommend eye drops and warm compresses, likely was just from laying on her side, exam wnl  M recommends head/neck CT, will hold off for now. If sxs persist would consider getting neuro consult first  Continue inpatient care    Duncan Boogie MD

## 2023-08-28 LAB
BLD GP AB SCN SERPL QL: NEGATIVE — SIGNIFICANT CHANGE UP
CULTURE RESULTS: SIGNIFICANT CHANGE UP
RH IG SCN BLD-IMP: POSITIVE — SIGNIFICANT CHANGE UP
SPECIMEN SOURCE: SIGNIFICANT CHANGE UP

## 2023-08-28 PROCEDURE — 99232 SBSQ HOSP IP/OBS MODERATE 35: CPT | Mod: GC

## 2023-08-28 PROCEDURE — 71045 X-RAY EXAM CHEST 1 VIEW: CPT | Mod: 26

## 2023-08-28 RX ORDER — ACETAMINOPHEN 500 MG
1000 TABLET ORAL ONCE
Refills: 0 | Status: COMPLETED | OUTPATIENT
Start: 2023-08-28 | End: 2023-08-28

## 2023-08-28 RX ADMIN — Medication 1 DROP(S): at 22:54

## 2023-08-28 RX ADMIN — SODIUM CHLORIDE 125 MILLILITER(S): 9 INJECTION, SOLUTION INTRAVENOUS at 22:00

## 2023-08-28 RX ADMIN — Medication 1000 MILLIGRAM(S): at 01:45

## 2023-08-28 RX ADMIN — Medication 400 MILLIGRAM(S): at 22:55

## 2023-08-28 RX ADMIN — HEPARIN SODIUM 5000 UNIT(S): 5000 INJECTION INTRAVENOUS; SUBCUTANEOUS at 23:04

## 2023-08-28 RX ADMIN — PANTOPRAZOLE SODIUM 40 MILLIGRAM(S): 20 TABLET, DELAYED RELEASE ORAL at 12:08

## 2023-08-28 RX ADMIN — Medication 400 MILLIGRAM(S): at 01:12

## 2023-08-28 RX ADMIN — Medication 1000 MILLIGRAM(S): at 23:30

## 2023-08-28 RX ADMIN — PANTOPRAZOLE SODIUM 40 MILLIGRAM(S): 20 TABLET, DELAYED RELEASE ORAL at 22:58

## 2023-08-28 RX ADMIN — HEPARIN SODIUM 5000 UNIT(S): 5000 INJECTION INTRAVENOUS; SUBCUTANEOUS at 12:08

## 2023-08-28 NOTE — CONSULT NOTE ADULT - SUBJECTIVE AND OBJECTIVE BOX
Neurology - Consult Note    -  Spectra: [phone] ([facility]), [phone] ([facility])  -    HPI: Patient ADRIÁN SHETTY is a 38y (1985) woman with GERD and is 35 weeks pregnant admitted for dysphagia. Patient reports in June having COVID and had nausea and vomiting. She vomiting several times, but denied swallowing difficulty at that time. She reports 2 weeks later starting to develop swallowing difficulties to solid food initially then to liquids. She initially felt her throat was very dry as she was not eating ro drinking much when she had COVID due to vomiting. She felt her throat was too dry to swallow. For that past 4 days she has not eaten solid food. She feels like there is tightness in her throat if she drinks liquids or solids. The tightness gives her anxiety and she feels like it will get stuck. She denies coughing or choking on solids or liquids. She also reports having acid reflex prior to pregnancy and worsening while pregnant. She also has generalized weakness and fatigue due to not eating. Denies burning sensation in her throat or heart burn. She does reports swallowing difficulty is worse in the morning and gets better throughout the day. She also feels the liquids and puree food that shes been taking has been coming back up. Denies numbness changes speech.       Review of Systems:  All other review of systems is negative unless indicated above.    Allergies:  No Known Drug Allergies  shellfish (Hives)  Beef (Unknown)  Soy (Hives)      PMHx/PSHx/Family Hx: As above, otherwise see below   GERD (gastroesophageal reflux disease)    Acid reflux        Social Hx:  No current use of tobacco, alcohol, or illicit drugs      Medications:  MEDICATIONS  (STANDING):  acetaminophen     Tablet .. 975 milliGRAM(s) Oral once  artificial tears (preservative free) Ophthalmic Solution 1 Drop(s) Both EYES four times a day  ferrous    sulfate Liquid 300 milliGRAM(s) Enteral Tube daily  heparin   Injectable 5000 Unit(s) SubCutaneous every 12 hours  pantoprazole  Injectable 40 milliGRAM(s) IV Push every 12 hours  sodium chloride 0.9% 1000 milliLiter(s) (125 mL/Hr) IV Continuous <Continuous>    MEDICATIONS  (PRN):  hydrocortisone 1% Cream 1 Application(s) Topical three times a day PRN Rash and/or Itching      Vitals:  T(C): 36.8 (08-28-23 @ 09:32), Max: 36.8 (08-28-23 @ 09:32)  HR: 70 (08-28-23 @ 09:32) (65 - 90)  BP: 113/55 (08-28-23 @ 09:32) (103/65 - 121/68)  RR: 17 (08-28-23 @ 09:32) (17 - 18)  SpO2: 98% (08-28-23 @ 09:32) (98% - 100%)    Physical Examination:  General - NAD, NGT in place     Neurologic Exam:  Mental status - Awake, Alert, Oriented to person, place, and time. Speech fluent, repetition and naming intact. Follows simple and complex commands.     Cranial nerves - PERRL, VFF, EOMI, face sensation (V1-V3) intact b/l, facial strength intact without asymmetry b/l, hearing intact b/l, palate with symmetric elevation, trapezius 5/5 strength b/l, tongue midline on protrusion with full lateral movement    Motor - Normal bulk and tone throughout. No pronator drift.  Strength testing            Deltoid      Biceps      Triceps     Wrist Extension    Wrist Flexion            R            5                 5               5                     5                              5                        5                   L             5                 5               5                     5                              5                        5                               Hip Flexion    Hip Extension    Knee Flexion    Knee Extension    Dorsiflexion    Plantar Flexion  R              5                           5                       5                           5                            5                          5  L              5                           5                        5                           5                            5                          5    Sensation - Light touch/temperature intact throughout    DTR's -             Biceps      Triceps     Brachioradialis      Patellar    Ankle    Toes/plantar response  R             2+             2+                  2+                       2+            2+                 Down  L              2+             2+                 2+                        2+           2+                 Down    Coordination - Finger to Nose intact b/l. No tremors appreciated    Gait and station - Deffered due to fall safety risk    Labs:    08-27    136  |  101  |  5<L>  ----------------------------<  66<L>  3.5   |  18<L>  |  0.54    Ca    8.6      27 Aug 2023 05:15    TPro  6.4  /  Alb  3.2<L>  /  TBili  0.6  /  DBili  x   /  AST  101<H>  /  ALT  82<H>  /  AlkPhos  149<H>  08-27    CAPILLARY BLOOD GLUCOSE        LIVER FUNCTIONS - ( 27 Aug 2023 05:15 )  Alb: 3.2 g/dL / Pro: 6.4 g/dL / ALK PHOS: 149 U/L / ALT: 82 U/L / AST: 101 U/L / GGT: x             Culture - Sputum (collected 26 Aug 2023 01:54)  Source: .Sputum Sputum  Gram Stain (26 Aug 2023 15:51):    Rare Squamous epithelial cells per low power field    No polymorphonuclear leukocytes per low power field    Rare Gram Negative Diplococci per oil power field    Rare Gram Positive Rods per oil power field  Final Report (28 Aug 2023 10:35):    Moderate Haemophilus haemolyticus "Susceptibilities not performed"    Normal Respiratory Vidya present    Culture - Fungal, Sputum (collected 26 Aug 2023 01:54)  Source: .Sputum Sputum  Preliminary Report (28 Aug 2023 11:20):    Testing in progress               Neurology - Consult Note    -  Spectra: [phone] ([facility]), [phone] ([facility])  -    HPI: Patient ADRIÁN HSETTY is a 38y (1985) woman with GERD and is 35 weeks pregnant admitted for dysphagia. Patient reports in June having COVID and had nausea and vomiting. She vomiting several times, but denied swallowing difficulty at that time. She reports 2 weeks later starting to develop swallowing difficulties to solid food initially then to liquids. She initially felt her throat was very dry as she was not eating ro drinking much when she had COVID due to vomiting. She felt her throat was too dry to swallow. For that past 4 days she has not eaten solid food. She feels like there is tightness in her throat if she drinks liquids or solids. The tightness gives her anxiety and she feels like it will get stuck. She denies coughing or choking on solids or liquids. She also reports having acid reflex prior to pregnancy and worsening while pregnant. She also has generalized weakness and fatigue due to not eating. Denies burning sensation in her throat or heart burn. She does reports swallowing difficulty is worse in the morning and gets better throughout the day. She also feels the liquids and puree food that shes been taking has been coming back up. Denies numbness, changes speech, diplopia.       Review of Systems:  All other review of systems is negative unless indicated above.    Allergies:  No Known Drug Allergies  shellfish (Hives)  Beef (Unknown)  Soy (Hives)      PMHx/PSHx/Family Hx: As above, otherwise see below   GERD (gastroesophageal reflux disease)    Acid reflux        Social Hx:  No current use of tobacco, alcohol, or illicit drugs      Medications:  MEDICATIONS  (STANDING):  acetaminophen     Tablet .. 975 milliGRAM(s) Oral once  artificial tears (preservative free) Ophthalmic Solution 1 Drop(s) Both EYES four times a day  ferrous    sulfate Liquid 300 milliGRAM(s) Enteral Tube daily  heparin   Injectable 5000 Unit(s) SubCutaneous every 12 hours  pantoprazole  Injectable 40 milliGRAM(s) IV Push every 12 hours  sodium chloride 0.9% 1000 milliLiter(s) (125 mL/Hr) IV Continuous <Continuous>    MEDICATIONS  (PRN):  hydrocortisone 1% Cream 1 Application(s) Topical three times a day PRN Rash and/or Itching      Vitals:  T(C): 36.8 (08-28-23 @ 09:32), Max: 36.8 (08-28-23 @ 09:32)  HR: 70 (08-28-23 @ 09:32) (65 - 90)  BP: 113/55 (08-28-23 @ 09:32) (103/65 - 121/68)  RR: 17 (08-28-23 @ 09:32) (17 - 18)  SpO2: 98% (08-28-23 @ 09:32) (98% - 100%)    Physical Examination:  General - NAD, NGT in place     Neurologic Exam:  Mental status - Awake, Alert, Oriented to person, place, and time. Speech fluent, repetition and naming intact. Follows simple and complex commands.     Cranial nerves - PERRL, VFF, EOMI, face sensation (V1-V3) intact b/l, facial strength intact without asymmetry b/l, hearing intact b/l, palate with symmetric elevation, trapezius 5/5 strength b/l, tongue midline on protrusion with full lateral movement    Motor - Normal bulk and tone throughout. No pronator drift.  Strength testing            Deltoid      Biceps      Triceps     Wrist Extension    Wrist Flexion            R            5                 5               5                     5                              5                        5                   L             5                 5               5                     5                              5                        5                               Hip Flexion    Hip Extension    Knee Flexion    Knee Extension    Dorsiflexion    Plantar Flexion  R              5                           5                       5                           5                            5                          5  L              5                           5                        5                           5                            5                          5    Sensation - Light touch/temperature intact throughout    DTR's -             Biceps      Triceps     Brachioradialis      Patellar    Ankle    Toes/plantar response  R             2+             2+                  2+                       2+            2+                 Down  L              2+             2+                 2+                        2+           2+                 Down    Coordination - Finger to Nose intact b/l. No tremors appreciated    Gait and station - Deffered due to fall safety risk    Labs:    08-27    136  |  101  |  5<L>  ----------------------------<  66<L>  3.5   |  18<L>  |  0.54    Ca    8.6      27 Aug 2023 05:15    TPro  6.4  /  Alb  3.2<L>  /  TBili  0.6  /  DBili  x   /  AST  101<H>  /  ALT  82<H>  /  AlkPhos  149<H>  08-27    CAPILLARY BLOOD GLUCOSE        LIVER FUNCTIONS - ( 27 Aug 2023 05:15 )  Alb: 3.2 g/dL / Pro: 6.4 g/dL / ALK PHOS: 149 U/L / ALT: 82 U/L / AST: 101 U/L / GGT: x             Culture - Sputum (collected 26 Aug 2023 01:54)  Source: .Sputum Sputum  Gram Stain (26 Aug 2023 15:51):    Rare Squamous epithelial cells per low power field    No polymorphonuclear leukocytes per low power field    Rare Gram Negative Diplococci per oil power field    Rare Gram Positive Rods per oil power field  Final Report (28 Aug 2023 10:35):    Moderate Haemophilus haemolyticus "Susceptibilities not performed"    Normal Respiratory Vidya present    Culture - Fungal, Sputum (collected 26 Aug 2023 01:54)  Source: .Sputum Sputum  Preliminary Report (28 Aug 2023 11:20):    Testing in progress

## 2023-08-28 NOTE — CONSULT NOTE ADULT - ATTENDING COMMENTS
Ms. Wyatt is a 38 year old right handed [facility] woman with GERD and is 35 weeks pregnant admitted for dysphagia s/p COVID 19 infection.   Etiology of dysphagia is uncertain. Clinically low suspicious for central and neuromuscular junction disorder. However, patient will benefit from further work up to rule out treatable etiologies. MRI brain and Myasthenia gravis work up.    I agree with above exam, assessment and plan unless noted below,  Continue medical management, neuro- check and fall precaution.  GI and DVT prophylaxis.  I discussed the diagnosis and treatment plan with the patient. Risk benefit and alternatives to the treatment were discussed. All questions and concerns were addressed. The patient demonstrated good understanding of the treatment plan.  If you have any further questions, please do not hesitate to contact our consult service.  Thank you for allowing us to participate in this patient care.

## 2023-08-28 NOTE — PROGRESS NOTE ADULT - ASSESSMENT
Assessment and Plan: 39 y/o  @35w4d admitted for inability to tolerate PO and workup of dysphagia for 2 months. Pregnancy c/b IUGR (3%, AC 2%, UAD wnl). Workup thus far unremarkable. Patient continues to report same baseline complaints of throat discomfort, now on pureed diet and tube feeds. Currently in stable condition.    #Dysphagia  - Workup unremarkable, recommending tube feeds  - GI: no identified cause, signed off  - EGD w/ motility study (): negative  - Barium esophagram (): able to swallow contrast and barium pill without difficulty, no stricture or mass effect noted  - EGD (): normal RAC pattern, with additional KO feeding tube placement  - Viral and auto immune labs without identified cause    - ENT: flexible endoscope unremarkable, no acute intervention, f/u  outpatient  - Daily weights: admission weight 56.6kg, most recently 55.4kg   - Nutrition: 12hr tube feeds (-)   - TPN consult (): recommend tube feeds over TPN  - NGT placement confirmed by multiple CXR  - if vision changes return, obtain neuro consult    #Transaminitis  - Improving  - AST/ALT: 111/123 ->>52/62->>107/81->>57/50->86/67, f/u AM CMP  - Lipase: 94  - RUQ sono (): WNL    # IUGR - severe   - ATU (): Breech, anterior, LIDIA 17.23, UAD   - ATU (8/15): Breech, anterior, LIDIA 10.8, 1787g (3%, AC 2%, HC 1%) UAD wnl   - Repeat growth 3-4 weeks from prior    #Fetal Wellbeing  - NST BID  - BPP 2x/wk  - PNV    #Maternal Wellbeing  - HSQ for DVT ppx  - SW: recommend psychiatric evaluation  - Psychiatry: recommend anxiety medication (pt declines)  - C/w tube feeds per nutrition  - Case management planning    Sylvia Avalos MD, PGY-3  Assessment and Plan: 37 y/o  @35w5d admitted for inability to tolerate PO and workup of dysphagia for 2 months. Pregnancy c/b IUGR (3%, AC 2%, UAD wnl). Workup thus far unremarkable. Patient continues to report same baseline complaints of throat discomfort, now on pureed diet and tube feeds. Currently in stable condition.    #Dysphagia  - Workup unremarkable, recommending tube feeds  - GI: no identified cause, signed off  - EGD w/ motility study (): normal RAC pattern, with additional KO feeding tube placement  - Barium esophagram (): able to swallow contrast and barium pill without difficulty, no stricture or mass effect noted  - Viral and auto immune labs without identified cause    - ENT: flexible endoscope unremarkable, no acute intervention, f/u  outpatient  - Daily weights: admission weight 56.6kg, most recently 55.4kg   - Nutrition: 12hr tube feeds (-)   - TPN consult (): recommend tube feeds over TPN  - NGT placement confirmed by multiple CXR  - Neuro consult placed this AM, given worsening dysphagia and request for full liquid diet this AM.     #Transaminitis  - Improving  - AST/ALT: 111/123 ->>52/62->>107/81->>57/50->86/67->101/82  - Lipase: 94  - RUQ sono (): WNL    # IUGR - severe   - ATU (): Breech, anterior, LIDIA 17.23, UAD   - ATU (8/15): Breech, anterior, LIDIA 10.8, 1787g (3%, AC 2%, HC 1%) UAD wnl   - Repeat growth 3-4 weeks from prior    #Fetal Wellbeing  - NST BID  - BPP 2x/wk  - PNV    #Maternal Wellbeing  - HSQ for DVT ppx  - SW: recommend psychiatric evaluation  - Psychiatry: recommend anxiety medication (pt declines)  - C/w tube feeds per nutrition  - Case management planning    Sylvia Avalos MD, PGY-3

## 2023-08-28 NOTE — PROGRESS NOTE ADULT - SUBJECTIVE AND OBJECTIVE BOX
R3 Antepartum Note, HD#17    Patient seen and examined at bedside, no acute overnight events. Patient reporting improved vision, no blurriness.  Continues to report some mild improvement in abdominal discomfort and throat tightening. Reports she was able to take sips of her mother's home made soap.  Pt reports +FM, denies LOF, VB, ctx, HA, epigastric pain,  CP, SOB, N/V, fevers, and chills.         Vital Signs Last 24 Hours  T(C): 36.6 (08-27-23 @ 01:24), Max: 36.9 (08-26-23 @ 18:32)  HR: 75 (08-27-23 @ 01:24) (74 - 112)  BP: 106/66 (08-27-23 @ 01:24) (106/66 - 124/79)  RR: 17 (08-27-23 @ 01:24) (17 - 18)  SpO2: 98% (08-27-23 @ 01:24) (98% - 100%)    CAPILLARY BLOOD GLUCOSE          Physical Exam:  General: NAD  Abdomen: Soft, non-tender, gravid  Ext: No pain or swelling    NST reactive overnight    Labs:             8.7    5.11  )-----------( 156      ( 08-25 @ 09:36 )             27.8     08-26 @ 05:30    136  |  101  |  5   ----------------------------<  69  3.2   |  21  |  0.50    Ca    8.5      08-26 @ 05:30  Mg     1.90     08-25 @ 09:36    TPro  6.3  /  Alb  3.1  /  TBili  0.6  /  DBili  x   /  AST  86  /  ALT  67  /  AlkPhos  140  08-26 @ 05:30            MEDICATIONS  (STANDING):  acetaminophen     Tablet .. 975 milliGRAM(s) Oral once  artificial tears (preservative free) Ophthalmic Solution 1 Drop(s) Both EYES four times a day  ferrous    sulfate Liquid 300 milliGRAM(s) Enteral Tube daily  heparin   Injectable 5000 Unit(s) SubCutaneous every 12 hours  pantoprazole  Injectable 40 milliGRAM(s) IV Push every 12 hours  sodium chloride 0.9% 1000 milliLiter(s) (125 mL/Hr) IV Continuous <Continuous>    MEDICATIONS  (PRN):  hydrocortisone 1% Cream 1 Application(s) Topical three times a day PRN Rash and/or Itching         R3 Antepartum Note, HD#18    Patient seen and examined at bedside, no acute overnight events. Patient reporting improved vision, no blurriness.  Continues to report some mild improvement in abdominal discomfort though notes worsening dysphagia.   Pt reports +FM, denies LOF, VB, ctx, HA, epigastric pain,  CP, SOB, N/V, fevers, and chills.       Vital Signs Last 24 Hrs  T(C): 36.8 (28 Aug 2023 09:32), Max: 36.8 (28 Aug 2023 09:32)  T(F): 98.2 (28 Aug 2023 09:32), Max: 98.2 (28 Aug 2023 09:32)  HR: 70 (28 Aug 2023 09:32) (65 - 90)  BP: 113/55 (28 Aug 2023 09:32) (103/65 - 121/68)  RR: 17 (28 Aug 2023 09:32) (17 - 18)  SpO2: 98% (28 Aug 2023 09:32) (98% - 100%)    Parameters below as of 28 Aug 2023 09:32  Patient On (Oxygen Delivery Method): room air      Physical Exam:  General: NAD  Abdomen: Soft, non-tender, gravid  Ext: No pain or swelling    NST reactive overnight    Labs:  Blood type: A Positive  Rubella IgG: RPR: Negative      08-27-23 @ 05:15      136  |  101  |  5<L>  ----------------------------<  66<L>  3.5   |  18<L>  |  0.54    08-26-23 @ 05:30      136  |  101  |  5<L>  ----------------------------<  69<L>  3.2<L>   |  21<L>  |  0.50        Ca    8.6      27 Aug 2023 05:15  Ca    8.5      26 Aug 2023 05:30    TPro  6.4  /  Alb  3.2<L>  /  TBili  0.6  /  DBili  x   /  AST  101<H>  /  ALT  82<H>  /  AlkPhos  149<H>  08-27-23 @ 05:15  TPro  6.3  /  Alb  3.1<L>  /  TBili  0.6  /  DBili  x   /  AST  86<H>  /  ALT  67<H>  /  AlkPhos  140<H>  08-26-23 @ 05:30          MEDICATIONS  (STANDING):  acetaminophen     Tablet .. 975 milliGRAM(s) Oral once  artificial tears (preservative free) Ophthalmic Solution 1 Drop(s) Both EYES four times a day  ferrous    sulfate Liquid 300 milliGRAM(s) Enteral Tube daily  heparin   Injectable 5000 Unit(s) SubCutaneous every 12 hours  pantoprazole  Injectable 40 milliGRAM(s) IV Push every 12 hours  sodium chloride 0.9% 1000 milliLiter(s) (125 mL/Hr) IV Continuous <Continuous>    MEDICATIONS  (PRN):  hydrocortisone 1% Cream 1 Application(s) Topical three times a day PRN Rash and/or Itching

## 2023-08-28 NOTE — CHART NOTE - NSCHARTNOTEFT_GEN_A_CORE
Reason for Follow-Up Assessment: Severe Malnutrition    39 y/o  @35w5d admitted for inability to tolerate PO and workup of dysphagia for 2 months. Pregnancy c/b IUGR (3%, AC 2%, UAD wnl). Workup thus far unremarkable. Patient continues to report same baseline complaints of throat discomfort, now on pureed diet and tube feeds. Currently in stable condition.  (OB Resident/Attending Note 23). Dysphagia w/u noted to be unremarkable.  Patient had EGD, Barium esophagram, ENT evaluation.  Transaminitis noted o be improving.  Tube feeds remain appropriate to meet increased nutritional needs in setting of patient being in 3rd trimester of pregnancy.    RD visited patient for follow-up, family member also at bedside. Tolerating tube feeds without issue.  Patient stated she does not like soft and bite sized foods and prefers full liquids. Communicated same to team, which has been implemented. Patient receiving banana bags for micronutrient support. Patient indicated that she does not like Snapsheet supplement, dislikes smell of supplement and cannot tolerate.  Otherwise, denies nausea, vomiting, diarrhea and tolerating TwoCalHN tube feeds without issue.  EN providing 840ml of TwoCalHN --->1680kcal, 70gm protein, 588ml free water via formula.  Family also providing foods, patient reported to tolerate cream soups and dairy foods best.    Source: [ X ] Patient [ X ] Family [ X ] RN, NP  [ X ] Chart     Diet, Full Liquid:   No Beef  No Shellfish  Tube Feeding Modality: Nasogastric  TwoCal HN (TWOCALHNRTH)  Continuous  Starting Tube Feed Rate {mL per Hour}: 10  Increase Tube Feed Rate by (mL): 10     Every 4 hours  Until Goal Tube Feed Rate (mL per Hour): 70  Tube Feed Duration (in Hours): 24  Tube Feed Start Time: 07:00  Tube Feed Stop Time: 19:00 (23 @ 09:50)    GI: WDL. Reports regular BMs    PO intake:  [ X ] Poor < 50%  [ ] Fair 50-75% [ ] Good  % [ X ] Inconsistent PO intake    Adm Weight 54kgs 8/15/23  Weight hx -  - 52.6kg       - 53.2kg       - 54.1kg       - 55.4kg    Edema: 1+ edema to rt and lt foot per nursing OB flowsheets    Pressure Injuries: No pressure ulcers/DTI noted in flowsheets.     _______________ Pertinent Medications_______________  MEDICATIONS  (STANDING):  acetaminophen     Tablet .. 975 milliGRAM(s) Oral once  artificial tears (preservative free) Ophthalmic Solution 1 Drop(s) Both EYES four times a day  ferrous    sulfate Liquid 300 milliGRAM(s) Enteral Tube daily  heparin   Injectable 5000 Unit(s) SubCutaneous every 12 hours  pantoprazole  Injectable 40 milliGRAM(s) IV Push every 12 hours  sodium chloride 0.9% 1000 milliLiter(s) (125 mL/Hr) IV Continuous <Continuous>    MEDICATIONS  (PRN):  hydrocortisone 1% Cream 1 Application(s) Topical three times a day PRN Rash and/or Itching      __________________ Pertinent Labs__________________   08- Na136 mmol/L Glu 66 mg/dL<L> K+ 3.5 mmol/L Cr  0.54 mg/dL BUN 5 mg/dL<L>  Phos 3.7 mg/dL - Alb 3.2 g/dL<L>    Estimated Needs:   Kcal in 3rd trimester of pregnancy = 2447 kcals     (1995kcal (35 kcal/kg /57 kg pre-pregnancy weight + 452 kcals for 3rd trimester)    Protein: 1.6 - 1.8 g/kg (91-102g pro)    Previous Nutrition Diagnosis: Severe Malnutrition, Increased Nutrient Needs     Nutrition Diagnosis is [X] ongoing  [ ] resolved [ ] not applicable     Monitoring and Evaluation:     [ x ] Tolerance to diet prescription / adequacy of meal intake  [ x ] Weight trends   [ x ] Pertinent labs    Recommendations:  1) Diet / Supplement Changes: Suggest d/c of Snapsheet Standard 1.4 oral supplement provision due to reported dislike of supplement; change diet to Full Liquids due to poor reported tolerance to soft and bite sized foods; may consider increase of EN from 70ml/hr x 12 hr to 80ml/hr x 12 hr to further optimize nutrition, which would provide 960ml formula, 1920kcal, 80gm protein, 672ml free water (defer additional free water flush provision to physician / team); Add Orgain Vegan Vanilla (220kcal/16gm pro/11 fl oz) for PO.    2) Trend weights;  3) Please consistently document % meal intake in nursing flowsheets;  4) Honor food preferences within diet prescription.  5) Reconsult RDN as needed.    Philomena Valle MS, RDN, CDN  Pager [phone]  Also available on MS Teams

## 2023-08-28 NOTE — CONSULT NOTE ADULT - ASSESSMENT
38y (1985) woman with GERD and is 35 weeks pregnant admitted for dysphagia. Patient reports in June having COVID and had nausea and vomiting. She vomiting several times, but denied swallowing difficulty at that time. She reports 2 weeks later starting to develop swallowing difficulties to solid food initially then to liquids. She initially felt her throat was very dry as she was not eating ro drinking much when she had COVID due to vomiting. She felt her throat was too dry to swallow. For that past 4 days she has not eaten solid food. She feels like there is tightness in her throat if she drinks liquids or solids. The tightness gives her anxiety and she feels like it will get stuck. She denies coughing or choking on solids or liquids. She also reports having acid reflex prior to pregnancy and worsening while pregnant. Denies burning sensation in her throat or heart burn. She does reports swallowing difficulty is worse in the morning and gets better throughout the day. She also feels the liquids and puree food that shes been taking has been coming back up. Denies weakness to extremities, numbness changes speech.     Impression: Progressive dysphagia likely due to acid reflex esophagitis     Recommendation:         38y (1985) woman with GERD and is 35 weeks pregnant admitted for dysphagia. Patient reports in June having COVID and had nausea and vomiting. She vomiting several times, but denied swallowing difficulty at that time. She reports 2 weeks later starting to develop swallowing difficulties to solid food initially then to liquids. She initially felt her throat was very dry as she was not eating ro drinking much when she had COVID due to vomiting. She felt her throat was too dry to swallow. For that past 4 days she has not eaten solid food. She feels like there is tightness in her throat if she drinks liquids or solids. The tightness gives her anxiety and she feels like it will get stuck. She denies coughing or choking on solids or liquids. She also reports having acid reflex prior to pregnancy and worsening while pregnant. Denies burning sensation in her throat or heart burn. She does reports swallowing difficulty is worse in the morning and gets better throughout the day. She also feels the liquids and puree food that shes been taking has been coming back up. Denies weakness to extremities, numbness changes speech. Exam nonfocal.     Impression: Progressive dysphagia likely due to acid reflex esophagitis vs less likely esophagitis vs likely psychiatric (anxious about swallowing) vs unlikely neurodegenerative disease     Recommendation:   [] Can consider MRI B w/ w/o if no improvement in dysphagia   [] Aspiration precautions        To be seen by attending during AM rounds.  38y (1985) woman with GERD and is 35 weeks pregnant admitted for dysphagia. Patient reports in June having COVID and had nausea and vomiting. She vomiting several times, but denied swallowing difficulty at that time. She reports 2 weeks later starting to develop swallowing difficulties to solid food initially then to liquids. She initially felt her throat was very dry as she was not eating ro drinking much when she had COVID due to vomiting. She felt her throat was too dry to swallow. For that past 4 days she has not eaten solid food. She feels like there is tightness in her throat if she drinks liquids or solids. The tightness gives her anxiety and she feels like it will get stuck. She denies coughing or choking on solids or liquids. She also reports having acid reflex prior to pregnancy and worsening while pregnant. Denies burning sensation in her throat or heart burn. She does reports swallowing difficulty is worse in the morning and gets better throughout the day. She also feels the liquids and puree food that shes been taking has been coming back up. Denies weakness to extremities, numbness changes speech. Exam nonfocal.     Impression: Progressive dysphagia likely due to acid reflex esophagitis vs less likely esophagitis vs likely psychiatric (anxious about swallowing) vs unlikely neurodegenerative disease     Recommendation:   [] Can consider MRI B w/o if no improvement in dysphagia   [] Aspiration precautions        To be seen by attending during AM rounds.  38y (1985) woman with GERD and is 35 weeks pregnant admitted for dysphagia. Patient reports in June having COVID and had nausea and vomiting. She vomiting several times, but denied swallowing difficulty at that time. She reports 2 weeks later starting to develop swallowing difficulties to solid food initially then to liquids. She initially felt her throat was very dry as she was not eating ro drinking much when she had COVID due to vomiting. She felt her throat was too dry to swallow. For that past 4 days she has not eaten solid food. She feels like there is tightness in her throat if she drinks liquids or solids. The tightness gives her anxiety and she feels like it will get stuck. She denies coughing or choking on solids or liquids. She also reports having acid reflex prior to pregnancy and worsening while pregnant. Denies burning sensation in her throat or heart burn. She does reports swallowing difficulty is worse in the morning and gets better throughout the day. She also feels the liquids and puree food that shes been taking has been coming back up. Denies weakness to extremities, numbness changes speech. Exam nonfocal.     Impression: Progressive dysphagia likely due to acid reflex esophagitis vs psychiatric (anxious about swallowing) vs less likely myasthenia gravis vs less likey stroke vs unlikely autoimmune conditions like scleroderma     Recommendation:   []  MRI B w/o if no improvement in dysphagia   [] Please send Myasthenia labs (ACh binding, blocking, modulation, MuSK), LRP4 antibody; Thyroid panel; antistriated muscle antibodies  [] Aspiration precautions        Seen and discussed with Dr. Sellers.  38y (1985) woman with GERD and is 35 weeks pregnant admitted for dysphagia. Patient reports in June having COVID and had nausea and vomiting. She vomiting several times, but denied swallowing difficulty at that time. She reports 2 weeks later starting to develop swallowing difficulties to solid food initially then to liquids. She initially felt her throat was very dry as she was not eating ro drinking much when she had COVID due to vomiting. She felt her throat was too dry to swallow. For that past 4 days she has not eaten solid food. She feels like there is tightness in her throat if she drinks liquids or solids. The tightness gives her anxiety and she feels like it will get stuck. She denies coughing or choking on solids or liquids. She also reports having acid reflex prior to pregnancy and worsening while pregnant. Denies burning sensation in her throat or heart burn. She does reports swallowing difficulty is worse in the morning and gets better throughout the day. She also feels the liquids and puree food that shes been taking has been coming back up. Denies weakness to extremities, numbness changes speech. Exam nonfocal.     Impression: Progressive dysphagia likely due to acid reflex esophagitis vs psychiatric (anxious about swallowing) vs less likely myasthenia gravis vs less likey stroke vs unlikely autoimmune conditions like scleroderma     Recommendation:   []  MRI B w/o  [] Please send Myasthenia labs (ACh binding, blocking, modulation, MuSK), LRP4 antibody; Thyroid panel; antistriated muscle antibodies  [] Aspiration precautions        Seen and discussed with Dr. Sellers.

## 2023-08-28 NOTE — CHART NOTE - NSCHARTNOTEFT_GEN_A_CORE
NG tube dislodged and readjusted, Awaiting X ray for confirmation of placement. NG tube dislodged slightly and readjusted, Awaiting X ray for confirmation of placement.  Pt tolerated well. Will resume feeds when placement confirmed.     d/w PGY3  Marta Griffiths FNP-BC

## 2023-08-28 NOTE — CONSULT NOTE ADULT - CONSULT REQUESTED DATE/TIME
12-Aug-2023 11:15
14-Aug-2023 09:00
28-Aug-2023 12:20
12-Aug-2023
12-Aug-2023 14:05
26-Aug-2023 14:00

## 2023-08-29 ENCOUNTER — APPOINTMENT (OUTPATIENT)
Dept: ANTEPARTUM | Facility: CLINIC | Age: 38
End: 2023-08-29
Payer: COMMERCIAL

## 2023-08-29 ENCOUNTER — ASOB RESULT (OUTPATIENT)
Age: 38
End: 2023-08-29

## 2023-08-29 LAB
ALBUMIN SERPL ELPH-MCNC: 2.9 G/DL — LOW (ref 3.3–5)
ALP SERPL-CCNC: 144 U/L — HIGH (ref 40–120)
ALT FLD-CCNC: 69 U/L — HIGH (ref 4–33)
ANION GAP SERPL CALC-SCNC: 15 MMOL/L — HIGH (ref 7–14)
AST SERPL-CCNC: 63 U/L — HIGH (ref 4–32)
BILIRUB SERPL-MCNC: 0.4 MG/DL — SIGNIFICANT CHANGE UP (ref 0.2–1.2)
BUN SERPL-MCNC: 5 MG/DL — LOW (ref 7–23)
CALCIUM SERPL-MCNC: 8.4 MG/DL — SIGNIFICANT CHANGE UP (ref 8.4–10.5)
CHLORIDE SERPL-SCNC: 101 MMOL/L — SIGNIFICANT CHANGE UP (ref 98–107)
CO2 SERPL-SCNC: 20 MMOL/L — LOW (ref 22–31)
CREAT SERPL-MCNC: 0.54 MG/DL — SIGNIFICANT CHANGE UP (ref 0.5–1.3)
EGFR: 122 ML/MIN/1.73M2 — SIGNIFICANT CHANGE UP
GLUCOSE SERPL-MCNC: 73 MG/DL — SIGNIFICANT CHANGE UP (ref 70–99)
POTASSIUM SERPL-MCNC: 3.2 MMOL/L — LOW (ref 3.5–5.3)
POTASSIUM SERPL-SCNC: 3.2 MMOL/L — LOW (ref 3.5–5.3)
PROT SERPL-MCNC: 6.3 G/DL — SIGNIFICANT CHANGE UP (ref 6–8.3)
SODIUM SERPL-SCNC: 136 MMOL/L — SIGNIFICANT CHANGE UP (ref 135–145)

## 2023-08-29 PROCEDURE — 76820 UMBILICAL ARTERY ECHO: CPT | Mod: 26

## 2023-08-29 PROCEDURE — 76819 FETAL BIOPHYS PROFIL W/O NST: CPT | Mod: 26

## 2023-08-29 PROCEDURE — 99222 1ST HOSP IP/OBS MODERATE 55: CPT | Mod: GC

## 2023-08-29 RX ORDER — POTASSIUM CHLORIDE 20 MEQ
10 PACKET (EA) ORAL
Refills: 0 | Status: COMPLETED | OUTPATIENT
Start: 2023-08-29 | End: 2023-08-29

## 2023-08-29 RX ADMIN — Medication 100 MILLIEQUIVALENT(S): at 13:18

## 2023-08-29 RX ADMIN — PANTOPRAZOLE SODIUM 40 MILLIGRAM(S): 20 TABLET, DELAYED RELEASE ORAL at 09:07

## 2023-08-29 RX ADMIN — HEPARIN SODIUM 5000 UNIT(S): 5000 INJECTION INTRAVENOUS; SUBCUTANEOUS at 22:35

## 2023-08-29 RX ADMIN — Medication 100 MILLIEQUIVALENT(S): at 09:06

## 2023-08-29 RX ADMIN — Medication 100 MILLIEQUIVALENT(S): at 10:47

## 2023-08-29 RX ADMIN — HEPARIN SODIUM 5000 UNIT(S): 5000 INJECTION INTRAVENOUS; SUBCUTANEOUS at 10:52

## 2023-08-29 RX ADMIN — Medication 1 DROP(S): at 12:36

## 2023-08-29 RX ADMIN — Medication 1 DROP(S): at 18:34

## 2023-08-29 RX ADMIN — PANTOPRAZOLE SODIUM 40 MILLIGRAM(S): 20 TABLET, DELAYED RELEASE ORAL at 22:32

## 2023-08-29 RX ADMIN — SODIUM CHLORIDE 125 MILLILITER(S): 9 INJECTION, SOLUTION INTRAVENOUS at 18:04

## 2023-08-29 NOTE — PROGRESS NOTE ADULT - SUBJECTIVE AND OBJECTIVE BOX
R3 Antepartum Note, HD#19    Patient seen and examined at bedside, no acute overnight events. Patient reporting improved vision, no blurriness. Continues to report some mild improvement in abdominal discomfort though notes worsening dysphagia.   Pt reports +FM, denies LOF, VB, ctx, HA, epigastric pain,  CP, SOB, N/V, fevers, and chills.       Vital Signs Last 24 Hrs  T(C): 36.6 (29 Aug 2023 06:03), Max: 37.1 (28 Aug 2023 17:53)  T(F): 97.8 (29 Aug 2023 06:03), Max: 98.8 (28 Aug 2023 22:47)  HR: 77 (29 Aug 2023 06:03) (64 - 88)  BP: 109/73 (29 Aug 2023 06:03) (102/66 - 109/73)  RR: 17 (29 Aug 2023 06:03) (16 - 17)  SpO2: 100% (29 Aug 2023 06:03) (98% - 100%)    Parameters below as of 29 Aug 2023 06:03  Patient On (Oxygen Delivery Method): room air      Physical Exam:  General: NAD  Abdomen: Soft, non-tender, gravid  Ext: No pain or swelling    NST reactive overnight    Labs:  Blood type: A Positive  Rubella IgG: RPR: Negative      08-29-23 @ 06:07      136  |  101  |  5<L>  ----------------------------<  73  3.2<L>   |  20<L>  |  0.54    08-27-23 @ 05:15      136  |  101  |  5<L>  ----------------------------<  66<L>  3.5   |  18<L>  |  0.54        Ca    8.4      29 Aug 2023 06:07  Ca    8.6      27 Aug 2023 05:15    TPro  6.3  /  Alb  2.9<L>  /  TBili  0.4  /  DBili  x   /  AST  63<H>  /  ALT  69<H>  /  AlkPhos  144<H>  08-29-23 @ 06:07  TPro  6.4  /  Alb  3.2<L>  /  TBili  0.6  /  DBili  x   /  AST  101<H>  /  ALT  82<H>  /  AlkPhos  149<H>  08-27-23 @ 05:15          MEDICATIONS  (STANDING):  acetaminophen     Tablet .. 975 milliGRAM(s) Oral once  artificial tears (preservative free) Ophthalmic Solution 1 Drop(s) Both EYES four times a day  ferrous    sulfate Liquid 300 milliGRAM(s) Enteral Tube daily  heparin   Injectable 5000 Unit(s) SubCutaneous every 12 hours  pantoprazole  Injectable 40 milliGRAM(s) IV Push every 12 hours  potassium chloride  10 mEq/100 mL IVPB 10 milliEquivalent(s) IV Intermittent every 1 hour  sodium chloride 0.9% 1000 milliLiter(s) (125 mL/Hr) IV Continuous <Continuous>    MEDICATIONS  (PRN):  hydrocortisone 1% Cream 1 Application(s) Topical three times a day PRN Rash and/or Itching

## 2023-08-29 NOTE — PROGRESS NOTE ADULT - ASSESSMENT
Assessment and Plan: 39 y/o  @35w6d admitted for inability to tolerate PO and workup of dysphagia for 2 months. Pregnancy c/b IUGR (3%, AC 2%, UAD wnl). Workup thus far unremarkable. Patient continues to report same baseline complaints of throat discomfort, now on pureed diet and tube feeds. Currently in stable condition.    #Dysphagia  - Workup unremarkable, recommending tube feeds  - GI: no identified cause, signed off  - EGD w/ motility study (): normal RAC pattern, with additional KO feeding tube placement  - Barium esophagram (): able to swallow contrast and barium pill without difficulty, no stricture or mass effect noted  - Viral and auto immune labs without identified cause    - ENT: flexible endoscope unremarkable, no acute intervention, f/u  outpatient  - Daily weights: admission weight 56.6kg, most recently 55.4kg   - Nutrition: 12hr tube feeds (-)   - TPN consult (): recommend tube feeds over TPN  - NGT placement confirmed by multiple CXR  - Neuro: MRI Head pending    #Transaminitis  - Improving  - AST/ALT: 111/123 ->>52/62->>107/81->>57/50->86/67->101/82->63/69  - Lipase: 94  - RUQ sono (): WNL    # IUGR - severe   - ATU (): Breech, anterior, LIDIA 17.23, UAD   - ATU (8/15): Breech, anterior, LIDIA 10.8, 1787g (3%, AC 2%, HC 1%) UAD wnl   - Repeat growth 3-4 weeks from prior    #Fetal Wellbeing  - NST BID  - BPP 2x/wk  - PNV    #Maternal Wellbeing  - HSQ for DVT ppx  - SW: recommend psychiatric evaluation  - Psychiatry: recommend anxiety medication (pt declines)  - C/w tube feeds per nutrition  - Case management planning    Sylvia Avalos MD, PGY-3

## 2023-08-29 NOTE — CHART NOTE - NSCHARTNOTEFT_GEN_A_CORE
R3 Eval Note    Evaluated patient at bedside following report from RN of cramping abdominal pain. On monitor with reactive tracing and intermittent contractions.    Patient describes feeling "sudden onset of pain that last an hour" but states that she is currently comfortable. Does not endorse feeling the contractions picking up on the monitor. Endorses good fetal movement, denies recurrent contractions, leakage of fluid, vaginal bleeding.    Of note, CXR w/ prelim read confirming NG tube placement however patient deferring tube feed until AM.    - d/c EFM given reactive tracing, few contractions, and comfortable patient  - Continue to monitor    d/w attending Dr Josey Oconnor  PGY3 R3 Eval Note    Charting delayed 2/2 clinical duties, patient evaluated earlier at 130am    Evaluated patient at bedside following report from RN of cramping abdominal pain. On monitor with reactive tracing and intermittent contractions.    Patient describes feeling "sudden onset of pain that last an hour" but states that she is currently comfortable. Does not endorse feeling the contractions picking up on the monitor. Endorses good fetal movement, denies recurrent contractions, leakage of fluid, vaginal bleeding.    Of note, CXR w/ prelim read confirming NG tube placement however patient deferring tube feed until AM.    - d/c EFM given reactive tracing, few contractions, and comfortable patient  - Continue to monitor    d/w attending Dr Josey Oconnor  PGY3

## 2023-08-30 LAB
ALBUMIN SERPL ELPH-MCNC: 2.9 G/DL — LOW (ref 3.3–5)
ALP SERPL-CCNC: 137 U/L — HIGH (ref 40–120)
ALT FLD-CCNC: 59 U/L — HIGH (ref 4–33)
ANION GAP SERPL CALC-SCNC: 12 MMOL/L — SIGNIFICANT CHANGE UP (ref 7–14)
AST SERPL-CCNC: 50 U/L — HIGH (ref 4–32)
BILIRUB SERPL-MCNC: 0.4 MG/DL — SIGNIFICANT CHANGE UP (ref 0.2–1.2)
BUN SERPL-MCNC: 4 MG/DL — LOW (ref 7–23)
CALCIUM SERPL-MCNC: 8.5 MG/DL — SIGNIFICANT CHANGE UP (ref 8.4–10.5)
CHLORIDE SERPL-SCNC: 104 MMOL/L — SIGNIFICANT CHANGE UP (ref 98–107)
CO2 SERPL-SCNC: 19 MMOL/L — LOW (ref 22–31)
CREAT SERPL-MCNC: 0.53 MG/DL — SIGNIFICANT CHANGE UP (ref 0.5–1.3)
EGFR: 123 ML/MIN/1.73M2 — SIGNIFICANT CHANGE UP
GLUCOSE SERPL-MCNC: 76 MG/DL — SIGNIFICANT CHANGE UP (ref 70–99)
POTASSIUM SERPL-MCNC: 3.2 MMOL/L — LOW (ref 3.5–5.3)
POTASSIUM SERPL-SCNC: 3.2 MMOL/L — LOW (ref 3.5–5.3)
PROT SERPL-MCNC: 5.9 G/DL — LOW (ref 6–8.3)
SODIUM SERPL-SCNC: 135 MMOL/L — SIGNIFICANT CHANGE UP (ref 135–145)
T3 SERPL-MCNC: 75 NG/DL — LOW (ref 80–200)
T4 AB SER-ACNC: 9.23 UG/DL — SIGNIFICANT CHANGE UP (ref 5.1–13)
TSH SERPL-MCNC: 1.59 UIU/ML — SIGNIFICANT CHANGE UP (ref 0.27–4.2)

## 2023-08-30 PROCEDURE — 99233 SBSQ HOSP IP/OBS HIGH 50: CPT | Mod: GC

## 2023-08-30 RX ORDER — POTASSIUM CHLORIDE 20 MEQ
10 PACKET (EA) ORAL
Refills: 0 | Status: COMPLETED | OUTPATIENT
Start: 2023-08-30 | End: 2023-08-30

## 2023-08-30 RX ORDER — POTASSIUM CHLORIDE 20 MEQ
10 PACKET (EA) ORAL
Refills: 0 | Status: DISCONTINUED | OUTPATIENT
Start: 2023-08-30 | End: 2023-08-30

## 2023-08-30 RX ORDER — POTASSIUM CHLORIDE 20 MEQ
40 PACKET (EA) ORAL ONCE
Refills: 0 | Status: DISCONTINUED | OUTPATIENT
Start: 2023-08-30 | End: 2023-08-30

## 2023-08-30 RX ADMIN — PANTOPRAZOLE SODIUM 40 MILLIGRAM(S): 20 TABLET, DELAYED RELEASE ORAL at 22:58

## 2023-08-30 RX ADMIN — HEPARIN SODIUM 5000 UNIT(S): 5000 INJECTION INTRAVENOUS; SUBCUTANEOUS at 12:04

## 2023-08-30 RX ADMIN — SODIUM CHLORIDE 125 MILLILITER(S): 9 INJECTION, SOLUTION INTRAVENOUS at 16:11

## 2023-08-30 RX ADMIN — PANTOPRAZOLE SODIUM 40 MILLIGRAM(S): 20 TABLET, DELAYED RELEASE ORAL at 11:04

## 2023-08-30 RX ADMIN — SODIUM CHLORIDE 125 MILLILITER(S): 9 INJECTION, SOLUTION INTRAVENOUS at 22:58

## 2023-08-30 RX ADMIN — HEPARIN SODIUM 5000 UNIT(S): 5000 INJECTION INTRAVENOUS; SUBCUTANEOUS at 23:04

## 2023-08-30 RX ADMIN — Medication 100 MILLIEQUIVALENT(S): at 11:14

## 2023-08-30 RX ADMIN — Medication 100 MILLIEQUIVALENT(S): at 15:04

## 2023-08-30 RX ADMIN — Medication 1 DROP(S): at 12:04

## 2023-08-30 RX ADMIN — Medication 100 MILLIEQUIVALENT(S): at 14:16

## 2023-08-30 NOTE — PROGRESS NOTE ADULT - ASSESSMENT
Assessment and Plan: 39 y/o  @36w admitted for inability to tolerate PO and workup of dysphagia for 2 months. Pregnancy c/b IUGR (3%, AC 2%, UAD wnl). Workup thus far unremarkable. Patient continues to report same baseline complaints of throat discomfort, now on pureed diet and tube feeds. Currently in stable condition.    #Dysphagia  - Workup unremarkable, recommending tube feeds  - GI: no identified cause, signed off  - EGD w/ motility study (): normal RAC pattern, with additional KO feeding tube placement  - Barium esophagram (): able to swallow contrast and barium pill without difficulty, no stricture or mass effect noted  - Viral and auto immune labs without identified cause    - ENT: flexible endoscope unremarkable, no acute intervention, f/u  outpatient  - Daily weights: admission weight 56.6kg, most recently 55.4kg   - Nutrition: 12hr tube feeds (-)   - TPN consult (): recommend tube feeds over TPN  - NGT placement confirmed by multiple CXR  - Neuro: MRI Head declined per patient, myasthenia labs pending this AM    #Transaminitis  - Improving  - AST/ALT: 111/123 ->>52/62->>107/81->>57/50->86/67->101/82->63/69->50/59  - Lipase: 94  - RUQ sono (): WNL    # IUGR - severe  -ATU (): Breech, ant, LIDIA 9.12 BPP 8/8 UAD wnl.  -ATU (): Breech, ant, LIDIA 6.32, BPP 8/8 UAD wnl   - ATU (): Breech, anterior, LIDIA 17.23, UAD   - ATU (8/15): Breech, anterior, LIDIA 10.8, 1787g (3%, AC 2%, HC 1%) UAD wnl   - Repeat growth 3-4 weeks from prior    #Fetal Wellbeing  - NST BID  - BPP 2x/wk  - PNV    #Maternal Wellbeing  - HSQ for DVT ppx  - SW: recommend psychiatric evaluation  - Psychiatry: recommend anxiety medication (pt declines)  - C/w tube feeds per nutrition  - Case management planning    Sylvia Avalos MD, PGY-3

## 2023-08-30 NOTE — PROGRESS NOTE ADULT - SUBJECTIVE AND OBJECTIVE BOX
R3 Antepartum Note, HD#20    Patient seen and examined at bedside, no acute overnight events. Patient reporting improved vision, no blurriness. Continues to report some mild improvement in abdominal discomfort though dysphagia is stable. Paitient was unable to tolerate MRI and declined the study yesterday per recommendation by Neurology. Pt reports +FM, denies LOF, VB, ctx, HA, epigastric pain,  CP, SOB, N/V, fevers, and chills.       Vital Signs Last 24 Hrs  T(C): 36.7 (30 Aug 2023 05:26), Max: 36.9 (29 Aug 2023 22:50)  T(F): 98 (30 Aug 2023 05:26), Max: 98.4 (29 Aug 2023 22:50)  HR: 81 (30 Aug 2023 05:26) (74 - 85)  BP: 111/62 (30 Aug 2023 05:26) (109/70 - 114/76)  RR: 17 (30 Aug 2023 05:26) (16 - 17)  SpO2: 97% (30 Aug 2023 05:26) (97% - 100%)    Parameters below as of 30 Aug 2023 05:26  Patient On (Oxygen Delivery Method): room air        Physical Exam:  General: NAD  Abdomen: Soft, non-tender, gravid  Ext: No pain or swelling    NST reactive overnight    Labs:  Blood type: A Positive  Rubella IgG: RPR: Negative      08-30-23 @ 05:30      135  |  104  |  4<L>  ----------------------------<  76  3.2<L>   |  19<L>  |  0.53    08-29-23 @ 06:07      136  |  101  |  5<L>  ----------------------------<  73  3.2<L>   |  20<L>  |  0.54        Ca    8.5      30 Aug 2023 05:30  Ca    8.4      29 Aug 2023 06:07    TPro  5.9<L>  /  Alb  2.9<L>  /  TBili  0.4  /  DBili  x   /  AST  50<H>  /  ALT  59<H>  /  AlkPhos  137<H>  08-30-23 @ 05:30  TPro  6.3  /  Alb  2.9<L>  /  TBili  0.4  /  DBili  x   /  AST  63<H>  /  ALT  69<H>  /  AlkPhos  144<H>  08-29-23 @ 06:07          MEDICATIONS  (STANDING):  acetaminophen     Tablet .. 975 milliGRAM(s) Oral once  artificial tears (preservative free) Ophthalmic Solution 1 Drop(s) Both EYES four times a day  ferrous    sulfate Liquid 300 milliGRAM(s) Enteral Tube daily  heparin   Injectable 5000 Unit(s) SubCutaneous every 12 hours  pantoprazole  Injectable 40 milliGRAM(s) IV Push every 12 hours  sodium chloride 0.9% 1000 milliLiter(s) (125 mL/Hr) IV Continuous <Continuous>    MEDICATIONS  (PRN):  hydrocortisone 1% Cream 1 Application(s) Topical three times a day PRN Rash and/or Itching

## 2023-08-31 ENCOUNTER — ASOB RESULT (OUTPATIENT)
Age: 38
End: 2023-08-31

## 2023-08-31 ENCOUNTER — APPOINTMENT (OUTPATIENT)
Dept: ANTEPARTUM | Facility: CLINIC | Age: 38
End: 2023-08-31
Payer: COMMERCIAL

## 2023-08-31 LAB
ALBUMIN SERPL ELPH-MCNC: 3 G/DL — LOW (ref 3.3–5)
ALP SERPL-CCNC: 136 U/L — HIGH (ref 40–120)
ALT FLD-CCNC: 48 U/L — HIGH (ref 4–33)
ANION GAP SERPL CALC-SCNC: 14 MMOL/L — SIGNIFICANT CHANGE UP (ref 7–14)
AST SERPL-CCNC: 38 U/L — HIGH (ref 4–32)
BILIRUB SERPL-MCNC: 0.4 MG/DL — SIGNIFICANT CHANGE UP (ref 0.2–1.2)
BLD GP AB SCN SERPL QL: NEGATIVE — SIGNIFICANT CHANGE UP
BUN SERPL-MCNC: 4 MG/DL — LOW (ref 7–23)
CALCIUM SERPL-MCNC: 8.4 MG/DL — SIGNIFICANT CHANGE UP (ref 8.4–10.5)
CHLORIDE SERPL-SCNC: 103 MMOL/L — SIGNIFICANT CHANGE UP (ref 98–107)
CO2 SERPL-SCNC: 21 MMOL/L — LOW (ref 22–31)
CREAT SERPL-MCNC: 0.52 MG/DL — SIGNIFICANT CHANGE UP (ref 0.5–1.3)
EGFR: 123 ML/MIN/1.73M2 — SIGNIFICANT CHANGE UP
GLUCOSE SERPL-MCNC: 84 MG/DL — SIGNIFICANT CHANGE UP (ref 70–99)
POTASSIUM SERPL-MCNC: 3.3 MMOL/L — LOW (ref 3.5–5.3)
POTASSIUM SERPL-SCNC: 3.3 MMOL/L — LOW (ref 3.5–5.3)
PROT SERPL-MCNC: 6.1 G/DL — SIGNIFICANT CHANGE UP (ref 6–8.3)
RH IG SCN BLD-IMP: POSITIVE — SIGNIFICANT CHANGE UP
SODIUM SERPL-SCNC: 138 MMOL/L — SIGNIFICANT CHANGE UP (ref 135–145)

## 2023-08-31 PROCEDURE — 76819 FETAL BIOPHYS PROFIL W/O NST: CPT | Mod: 26,59

## 2023-08-31 PROCEDURE — 76816 OB US FOLLOW-UP PER FETUS: CPT | Mod: 26

## 2023-08-31 PROCEDURE — 76820 UMBILICAL ARTERY ECHO: CPT | Mod: 26,59

## 2023-08-31 RX ORDER — SENNA PLUS 8.6 MG/1
5 TABLET ORAL ONCE
Refills: 0 | Status: DISCONTINUED | OUTPATIENT
Start: 2023-08-31 | End: 2023-09-03

## 2023-08-31 RX ORDER — POTASSIUM CHLORIDE 20 MEQ
10 PACKET (EA) ORAL
Refills: 0 | Status: DISCONTINUED | OUTPATIENT
Start: 2023-08-31 | End: 2023-08-31

## 2023-08-31 RX ORDER — POTASSIUM CHLORIDE 20 MEQ
40 PACKET (EA) ORAL ONCE
Refills: 0 | Status: COMPLETED | OUTPATIENT
Start: 2023-08-31 | End: 2023-08-31

## 2023-08-31 RX ADMIN — PANTOPRAZOLE SODIUM 40 MILLIGRAM(S): 20 TABLET, DELAYED RELEASE ORAL at 11:31

## 2023-08-31 RX ADMIN — Medication 40 MILLIEQUIVALENT(S): at 14:53

## 2023-08-31 RX ADMIN — SODIUM CHLORIDE 125 MILLILITER(S): 9 INJECTION, SOLUTION INTRAVENOUS at 11:38

## 2023-08-31 RX ADMIN — HEPARIN SODIUM 5000 UNIT(S): 5000 INJECTION INTRAVENOUS; SUBCUTANEOUS at 23:36

## 2023-08-31 RX ADMIN — HEPARIN SODIUM 5000 UNIT(S): 5000 INJECTION INTRAVENOUS; SUBCUTANEOUS at 11:39

## 2023-08-31 RX ADMIN — Medication 1 DROP(S): at 11:49

## 2023-08-31 RX ADMIN — PANTOPRAZOLE SODIUM 40 MILLIGRAM(S): 20 TABLET, DELAYED RELEASE ORAL at 23:36

## 2023-08-31 NOTE — PROGRESS NOTE ADULT - SUBJECTIVE AND OBJECTIVE BOX
R3 Antepartum Note, HD#21    Patient seen and examined at bedside, no acute overnight events. Patient reporting improved vision, no blurriness. Continues to report some mild improvement in abdominal discomfort though dysphagia is stable. Pt reports +FM, denies LOF, VB, ctx, HA, epigastric pain,  CP, SOB, N/V, fevers, and chills.       Vital Signs Last 24 Hrs  T(C): 36.5 (31 Aug 2023 05:23), Max: 37.2 (30 Aug 2023 22:28)  T(F): 97.7 (31 Aug 2023 05:23), Max: 98.9 (30 Aug 2023 22:28)  HR: 61 (31 Aug 2023 05:23) (61 - 93)  BP: 120/65 (31 Aug 2023 05:23) (105/66 - 122/76)  RR: 17 (31 Aug 2023 05:23) (16 - 17)  SpO2: 98% (31 Aug 2023 05:23) (98% - 100%)    Parameters below as of 31 Aug 2023 05:23  Patient On (Oxygen Delivery Method): room air        Physical Exam:  General: NAD  Abdomen: Soft, non-tender, gravid  Ext: No pain or swelling    NST reactive overnight    Labs:  Blood type: A Positive  Rubella IgG: RPR: Negative      08-31-23 @ 05:26      138  |  103  |  4<L>  ----------------------------<  84  3.3<L>   |  21<L>  |  0.52    08-30-23 @ 05:30      135  |  104  |  4<L>  ----------------------------<  76  3.2<L>   |  19<L>  |  0.53    08-29-23 @ 06:07      136  |  101  |  5<L>  ----------------------------<  73  3.2<L>   |  20<L>  |  0.54        Ca    8.4      31 Aug 2023 05:26  Ca    8.5      30 Aug 2023 05:30  Ca    8.4      29 Aug 2023 06:07    TPro  6.1  /  Alb  3.0<L>  /  TBili  0.4  /  DBili  x   /  AST  38<H>  /  ALT  48<H>  /  AlkPhos  136<H>  08-31-23 @ 05:26  TPro  5.9<L>  /  Alb  2.9<L>  /  TBili  0.4  /  DBili  x   /  AST  50<H>  /  ALT  59<H>  /  AlkPhos  137<H>  08-30-23 @ 05:30  TPro  6.3  /  Alb  2.9<L>  /  TBili  0.4  /  DBili  x   /  AST  63<H>  /  ALT  69<H>  /  AlkPhos  144<H>  08-29-23 @ 06:07          MEDICATIONS  (STANDING):  acetaminophen     Tablet .. 975 milliGRAM(s) Oral once  artificial tears (preservative free) Ophthalmic Solution 1 Drop(s) Both EYES four times a day  ferrous    sulfate Liquid 300 milliGRAM(s) Enteral Tube daily  heparin   Injectable 5000 Unit(s) SubCutaneous every 12 hours  pantoprazole  Injectable 40 milliGRAM(s) IV Push every 12 hours  sodium chloride 0.9% 1000 milliLiter(s) (125 mL/Hr) IV Continuous <Continuous>    MEDICATIONS  (PRN):  hydrocortisone 1% Cream 1 Application(s) Topical three times a day PRN Rash and/or Itching

## 2023-08-31 NOTE — PROGRESS NOTE ADULT - ASSESSMENT
A/P: 37 y/o  @36w1d admitted for inability to tolerate PO and workup of dysphagia for 2 months. Pregnancy c/b IUGR (3%, AC 2%, UAD wnl). Workup thus far unremarkable. Patient continues to report same baseline complaints of throat discomfort, now on pureed diet and tube feeds. Currently in stable condition.    #Dysphagia  - Workup unremarkable, recommending tube feeds  - GI: no identified cause, signed off  - EGD w/ motility study (): normal RAC pattern, with additional KO feeding tube placement  - Barium esophagram (): able to swallow contrast and barium pill without difficulty, no stricture or mass effect noted  - Viral and auto immune labs without identified cause    - ENT: flexible endoscope unremarkable, no acute intervention, f/u  outpatient  - Daily weights: admission weight 56.6kg, most recently 55.4kg   - Nutrition: 12hr tube feeds (-)   - TPN consult (): recommend tube feeds over TPN  - NGT placement confirmed by multiple CXR  - Neuro: MRI Head declined per patient, myasthenia labs pending this AM    #Transaminitis  - Improving  - AST/ALT: 111/123 ->>52/62->>107/81->>57/50->86/67->101/82->63/69->50/59  - Lipase: 94  - RUQ sono (): WNL    # IUGR - severe  -ATU (): Breech, ant, LIDIA 9.12 BPP 8/8 UAD wnl.  -ATU (): Breech, ant, LIDIA 6.32, BPP 8/8 UAD wnl   - ATU (): Breech, anterior, LIDIA 17.23, UAD   - ATU (8/15): Breech, anterior, LIDIA 10.8, 1787g (3%, AC 2%, HC 1%) UAD wnl   - Repeat growth 3-4 weeks from prior    #Fetal Wellbeing  - NST BID  - BPP 2x/wk  - PNV    #Maternal Wellbeing  - HSQ for DVT ppx  - SW: recommend psychiatric evaluation  - Psychiatry: recommend anxiety medication (pt declines)  - C/w tube feeds per nutrition  - Case management planning    Sylvia Avalos MD, PGY-3

## 2023-09-01 ENCOUNTER — APPOINTMENT (OUTPATIENT)
Dept: ANTEPARTUM | Facility: CLINIC | Age: 38
End: 2023-09-01

## 2023-09-01 LAB
ACRM BINDING ANTIBODY: <0.03 NMOL/L — SIGNIFICANT CHANGE UP (ref 0–0.24)
ALBUMIN SERPL ELPH-MCNC: 3 G/DL — LOW (ref 3.3–5)
ALP SERPL-CCNC: 131 U/L — HIGH (ref 40–120)
ALT FLD-CCNC: 43 U/L — HIGH (ref 4–33)
ANION GAP SERPL CALC-SCNC: 11 MMOL/L — SIGNIFICANT CHANGE UP (ref 7–14)
AST SERPL-CCNC: 32 U/L — SIGNIFICANT CHANGE UP (ref 4–32)
BASOPHILS # BLD AUTO: 0.05 K/UL — SIGNIFICANT CHANGE UP (ref 0–0.2)
BASOPHILS NFR BLD AUTO: 0.9 % — SIGNIFICANT CHANGE UP (ref 0–2)
BILIRUB SERPL-MCNC: 0.4 MG/DL — SIGNIFICANT CHANGE UP (ref 0.2–1.2)
BUN SERPL-MCNC: 3 MG/DL — LOW (ref 7–23)
CALCIUM SERPL-MCNC: 8.5 MG/DL — SIGNIFICANT CHANGE UP (ref 8.4–10.5)
CHLORIDE SERPL-SCNC: 103 MMOL/L — SIGNIFICANT CHANGE UP (ref 98–107)
CO2 SERPL-SCNC: 23 MMOL/L — SIGNIFICANT CHANGE UP (ref 22–31)
CREAT SERPL-MCNC: 0.51 MG/DL — SIGNIFICANT CHANGE UP (ref 0.5–1.3)
EGFR: 124 ML/MIN/1.73M2 — SIGNIFICANT CHANGE UP
EOSINOPHIL # BLD AUTO: 0.14 K/UL — SIGNIFICANT CHANGE UP (ref 0–0.5)
EOSINOPHIL NFR BLD AUTO: 2.5 % — SIGNIFICANT CHANGE UP (ref 0–6)
GLUCOSE SERPL-MCNC: 86 MG/DL — SIGNIFICANT CHANGE UP (ref 70–99)
HCT VFR BLD CALC: 26.6 % — LOW (ref 34.5–45)
HGB BLD-MCNC: 8.4 G/DL — LOW (ref 11.5–15.5)
IANC: 2.59 K/UL — SIGNIFICANT CHANGE UP (ref 1.8–7.4)
IMM GRANULOCYTES NFR BLD AUTO: 0.5 % — SIGNIFICANT CHANGE UP (ref 0–0.9)
LYMPHOCYTES # BLD AUTO: 2.14 K/UL — SIGNIFICANT CHANGE UP (ref 1–3.3)
LYMPHOCYTES # BLD AUTO: 38.8 % — SIGNIFICANT CHANGE UP (ref 13–44)
MCHC RBC-ENTMCNC: 26.2 PG — LOW (ref 27–34)
MCHC RBC-ENTMCNC: 31.6 GM/DL — LOW (ref 32–36)
MCV RBC AUTO: 82.9 FL — SIGNIFICANT CHANGE UP (ref 80–100)
MONOCYTES # BLD AUTO: 0.56 K/UL — SIGNIFICANT CHANGE UP (ref 0–0.9)
MONOCYTES NFR BLD AUTO: 10.2 % — SIGNIFICANT CHANGE UP (ref 2–14)
NEUTROPHILS # BLD AUTO: 2.59 K/UL — SIGNIFICANT CHANGE UP (ref 1.8–7.4)
NEUTROPHILS NFR BLD AUTO: 47.1 % — SIGNIFICANT CHANGE UP (ref 43–77)
NRBC # BLD: 0 /100 WBCS — SIGNIFICANT CHANGE UP (ref 0–0)
NRBC # FLD: 0 K/UL — SIGNIFICANT CHANGE UP (ref 0–0)
PLATELET # BLD AUTO: 166 K/UL — SIGNIFICANT CHANGE UP (ref 150–400)
POTASSIUM SERPL-MCNC: 3.4 MMOL/L — LOW (ref 3.5–5.3)
POTASSIUM SERPL-SCNC: 3.4 MMOL/L — LOW (ref 3.5–5.3)
PROT SERPL-MCNC: 6.1 G/DL — SIGNIFICANT CHANGE UP (ref 6–8.3)
RBC # BLD: 3.21 M/UL — LOW (ref 3.8–5.2)
RBC # FLD: 16 % — HIGH (ref 10.3–14.5)
SODIUM SERPL-SCNC: 137 MMOL/L — SIGNIFICANT CHANGE UP (ref 135–145)
WBC # BLD: 5.51 K/UL — SIGNIFICANT CHANGE UP (ref 3.8–10.5)
WBC # FLD AUTO: 5.51 K/UL — SIGNIFICANT CHANGE UP (ref 3.8–10.5)

## 2023-09-01 PROCEDURE — 99233 SBSQ HOSP IP/OBS HIGH 50: CPT | Mod: GC

## 2023-09-01 RX ORDER — POTASSIUM CHLORIDE 20 MEQ
40 PACKET (EA) ORAL ONCE
Refills: 0 | Status: COMPLETED | OUTPATIENT
Start: 2023-09-01 | End: 2023-09-01

## 2023-09-01 RX ORDER — POTASSIUM CHLORIDE 20 MEQ
10 PACKET (EA) ORAL
Refills: 0 | Status: DISCONTINUED | OUTPATIENT
Start: 2023-09-01 | End: 2023-09-01

## 2023-09-01 RX ADMIN — HEPARIN SODIUM 5000 UNIT(S): 5000 INJECTION INTRAVENOUS; SUBCUTANEOUS at 10:23

## 2023-09-01 RX ADMIN — PANTOPRAZOLE SODIUM 40 MILLIGRAM(S): 20 TABLET, DELAYED RELEASE ORAL at 10:23

## 2023-09-01 RX ADMIN — Medication 40 MILLIEQUIVALENT(S): at 09:23

## 2023-09-01 RX ADMIN — HEPARIN SODIUM 5000 UNIT(S): 5000 INJECTION INTRAVENOUS; SUBCUTANEOUS at 22:16

## 2023-09-01 RX ADMIN — PANTOPRAZOLE SODIUM 40 MILLIGRAM(S): 20 TABLET, DELAYED RELEASE ORAL at 22:16

## 2023-09-01 NOTE — PROGRESS NOTE ADULT - SUBJECTIVE AND OBJECTIVE BOX
R3 Antepartum Note, HD#22    Patient seen and examined at bedside, no acute overnight events. Patient reporting improved vision, no blurriness. Continues to report some mild improvement in abdominal discomfort though dysphagia is stable. Pt reports +FM, denies LOF, VB, ctx, HA, epigastric pain,  CP, SOB, N/V, fevers, and chills.       Vital Signs Last 24 Hrs  T(C): 36.7 (01 Sep 2023 06:04), Max: 37.2 (31 Aug 2023 22:51)  T(F): 98.1 (01 Sep 2023 06:04), Max: 98.9 (31 Aug 2023 22:51)  HR: 72 (01 Sep 2023 06:04) (70 - 89)  BP: 93/50 (01 Sep 2023 06:04) (93/50 - 119/72)  RR: 17 (01 Sep 2023 06:04) (16 - 18)  SpO2: 98% (01 Sep 2023 06:04) (96% - 99%)    Parameters below as of 01 Sep 2023 06:04  Patient On (Oxygen Delivery Method): room air        Physical Exam:  General: NAD  Abdomen: Soft, non-tender, gravid  Ext: No pain or swelling    NST reactive overnight    Labs:  Blood type: A Positive  Rubella IgG: RPR: Negative                          8.4<L>   5.51 >-----------< 166    ( 09-01 @ 06:08 )             26.6<L>    09-01-23 @ 06:08      137  |  103  |  3<L>  ----------------------------<  86  3.4<L>   |  23  |  0.51    08-31-23 @ 05:26      138  |  103  |  4<L>  ----------------------------<  84  3.3<L>   |  21<L>  |  0.52    08-30-23 @ 05:30      135  |  104  |  4<L>  ----------------------------<  76  3.2<L>   |  19<L>  |  0.53        Ca    8.5      01 Sep 2023 06:08  Ca    8.4      31 Aug 2023 05:26  Ca    8.5      30 Aug 2023 05:30    TPro  6.1  /  Alb  3.0<L>  /  TBili  0.4  /  DBili  x   /  AST  32  /  ALT  43<H>  /  AlkPhos  131<H>  09-01-23 @ 06:08  TPro  6.1  /  Alb  3.0<L>  /  TBili  0.4  /  DBili  x   /  AST  38<H>  /  ALT  48<H>  /  AlkPhos  136<H>  08-31-23 @ 05:26  TPro  5.9<L>  /  Alb  2.9<L>  /  TBili  0.4  /  DBili  x   /  AST  50<H>  /  ALT  59<H>  /  AlkPhos  137<H>  08-30-23 @ 05:30            MEDICATIONS  (STANDING):  acetaminophen     Tablet .. 975 milliGRAM(s) Oral once  artificial tears (preservative free) Ophthalmic Solution 1 Drop(s) Both EYES four times a day  ferrous    sulfate Liquid 300 milliGRAM(s) Enteral Tube daily  heparin   Injectable 5000 Unit(s) SubCutaneous every 12 hours  pantoprazole  Injectable 40 milliGRAM(s) IV Push every 12 hours  senna Syrup 5 milliLiter(s) Oral once  sodium chloride 0.9% 1000 milliLiter(s) (125 mL/Hr) IV Continuous <Continuous>    MEDICATIONS  (PRN):  hydrocortisone 1% Cream 1 Application(s) Topical three times a day PRN Rash and/or Itching

## 2023-09-01 NOTE — PROGRESS NOTE ADULT - ASSESSMENT
A/P: 39 y/o  @36w2d admitted for inability to tolerate PO and workup of dysphagia for 2 months. Pregnancy c/b IUGR (3%, AC 2%, UAD wnl). Workup thus far unremarkable. Patient continues to report same baseline complaints of throat discomfort, now on pureed diet and tube feeds. Currently in stable condition.    #Dysphagia  - Workup unremarkable, recommending tube feeds  - GI: no identified cause, signed off  - EGD w/ motility study (): normal RAC pattern, with additional KO feeding tube placement  - Barium esophagram (): able to swallow contrast and barium pill without difficulty, no stricture or mass effect noted  - Viral and auto immune labs without identified cause    - ENT: flexible endoscope unremarkable, no acute intervention, f/u  outpatient  - Daily weights: admission weight 56.6kg, most recently 55.4kg   - Nutrition: 12hr tube feeds (-)   - TPN consult (): recommend tube feeds over TPN  - NGT placement confirmed by multiple CXR  - Neuro: MRI Head declined per patient, myasthenia labs still pending this AM    #Transaminitis  - Improving  - AST/ALT: 111/123 ->>52/62->>107/81->>57/50->86/67->101/82->63/69->50/59->38/48->32/43  - Lipase: 94  - RUQ sono (): WNL    # IUGR - severe  -ATU (): Breech, ant, LIDIA 9.12 BPP 8/8 UAD wnl.  -ATU (): Breech, ant, LIDIA 6.32, BPP 8/8 UAD wnl   - ATU (): Breech, anterior, LIDIA 17.23, UAD   - ATU (8/15): Breech, anterior, LIDIA 10.8, 1787g (3%, AC 2%, HC 1%) UAD wnl   - Repeat growth 3-4 weeks from prior    #Fetal Wellbeing  - NST BID  - BPP 2x/wk  - PNV    #Maternal Wellbeing  - HSQ for DVT ppx  - SW: recommend psychiatric evaluation  - Psychiatry: recommend anxiety medication (pt declines)  - C/w tube feeds per nutrition  - Case management planning    Sylvia Avalos MD, PGY-3

## 2023-09-01 NOTE — PROGRESS NOTE ADULT - ATTENDING COMMENTS
Patient seen and examined at bedside, agree with assessment and plan as stated above    Duncan Boogie MD

## 2023-09-01 NOTE — CHART NOTE - NSCHARTNOTEFT_GEN_A_CORE
Reason for Follow-Up Assessment: Severe Malnutrition    A/P: 37 y/o  @36w2d admitted for inability to tolerate PO and workup of dysphagia for 2 months. Pregnancy c/b IUGR (3%, AC 2%, UAD wnl). Workup thus far unremarkable. Patient continues to report same baseline complaints of throat discomfort, now on pureed diet and tube feeds. Currently in stable condition. (23 OB Attending)    Met with patient today, spouse was present at bedside.  Patient still endorses difficulty with PO intake but reported good tolerance to tube feeds. In reviewing pump, RD noted that current rate is running at 10ml/hr when order is for rate to run at 80ml/hr x 12 hours; patient also reported that sometimes she stops the feedings at after 10 hours. RD educated patient extensively on importance of adequate nutrition to support fetal development and to meet increased nutritional needs in 3rd trimester of pregnancy. RD informed RN and PA of encounter; patient with without weight gain since EN initiation on , likely in setting of inadequate enteral feeding infusion. No GI distress reported i.e. nausea, vomiting, diarrhea. Encouraged intake of oral supplement - Orgain Vegan Vanilla (220kcal/16gm pro/11 fl oz).    Source: [X ] Patient [ ] Family [ X] RN [ X] Chart, PA     Diet, Full Liquid:   No Beef  No Shellfish  Tube Feeding Modality: Nasogastric  TwoCal HN (TWOCALHNRTH)  Total Volume for 24 Hours (mL): 960  Continuous  Starting Tube Feed Rate {mL per Hour}: 10  Increase Tube Feed Rate by (mL): 10     Every 4 hours  Until Goal Tube Feed Rate (mL per Hour): 80  Tube Feed Duration (in Hours): 12  Tube Feed Start Time: 07:00  Tube Feed Stop Time: 19:00 (23 @ 09:47)    60ml formula, 1920kcal, 80gm protein, 672ml free water    PO intake:  [ ] Poor < 50%  [ ] Fair 50-75% [ ] Good  % [ X ] Inconsistent PO intake    Weight Hx:   - 52.8kg       - 52.6kg       - 53.2kg       - 55.4kg    Edema: 1+ edema to lt and rt foot     Pressure Injuries: No pressure ulcers/DTI noted in flowsheets.     _______________ Pertinent Medications_______________  MEDICATIONS  (STANDING):  acetaminophen     Tablet .. 975 milliGRAM(s) Oral once  artificial tears (preservative free) Ophthalmic Solution 1 Drop(s) Both EYES four times a day  ferrous    sulfate Liquid 300 milliGRAM(s) Enteral Tube daily  heparin   Injectable 5000 Unit(s) SubCutaneous every 12 hours  pantoprazole  Injectable 40 milliGRAM(s) IV Push every 12 hours  senna Syrup 5 milliLiter(s) Oral once  sodium chloride 0.9% 1000 milliLiter(s) (125 mL/Hr) IV Continuous <Continuous>    MEDICATIONS  (PRN):  hydrocortisone 1% Cream 1 Application(s) Topical three times a day PRN Rash and/or Itching      __________________ Pertinent Labs__________________   09- Na137 mmol/L Glu 86 mg/dL K+ 3.4 mmol/L<L> Cr  0.51 mg/dL BUN 3 mg/dL<L>  Alb 3.0 g/dL<L>      Estimated Needs:   Kcal in 3rd trimester of pregnancy = 2447 kcals     (1995kcal (35 kcal/kg /57 kg pre-pregnancy weight + 452 kcals for 3rd trimester)    Protein: 1.6 - 1.8 g/kg (91-102g pro)    Previous Nutrition Diagnosis: Severe Malnutrition    Nutrition Diagnosis is [X] ongoing  [ ] resolved [ ] not applicable     New Nutrition Diagnosis: Inadequate Enteral Feeding Infusion     Monitoring and Evaluation:     [ x ] Tolerance to diet prescription / adequacy of meal intake / EN tolerance / EN rate   [ x ] Weight trends   [ x ] Pertinent labs    Recommendations:  1) Recommend TwoCalHN 960 ml - 80ml/hr x 12 hours (pt. refuses nocturnal feeds), ensure enteral feeding is running at prescribed rate.  2) Obtain current weight;  3) Please consistently document % meal intake in nursing flowsheets.     Philomena Valle, MS, RDN, CDN  Pager [phone]  Also available on MS Teams

## 2023-09-02 ENCOUNTER — TRANSCRIPTION ENCOUNTER (OUTPATIENT)
Age: 38
End: 2023-09-02

## 2023-09-02 LAB
ALBUMIN SERPL ELPH-MCNC: 3.1 G/DL — LOW (ref 3.3–5)
ALP SERPL-CCNC: 136 U/L — HIGH (ref 40–120)
ALT FLD-CCNC: 40 U/L — HIGH (ref 4–33)
ANION GAP SERPL CALC-SCNC: 14 MMOL/L — SIGNIFICANT CHANGE UP (ref 7–14)
AST SERPL-CCNC: 30 U/L — SIGNIFICANT CHANGE UP (ref 4–32)
BILIRUB SERPL-MCNC: 0.4 MG/DL — SIGNIFICANT CHANGE UP (ref 0.2–1.2)
BUN SERPL-MCNC: 6 MG/DL — LOW (ref 7–23)
CALCIUM SERPL-MCNC: 8.8 MG/DL — SIGNIFICANT CHANGE UP (ref 8.4–10.5)
CHLORIDE SERPL-SCNC: 100 MMOL/L — SIGNIFICANT CHANGE UP (ref 98–107)
CO2 SERPL-SCNC: 24 MMOL/L — SIGNIFICANT CHANGE UP (ref 22–31)
CREAT SERPL-MCNC: 0.53 MG/DL — SIGNIFICANT CHANGE UP (ref 0.5–1.3)
EGFR: 123 ML/MIN/1.73M2 — SIGNIFICANT CHANGE UP
GLUCOSE SERPL-MCNC: 80 MG/DL — SIGNIFICANT CHANGE UP (ref 70–99)
MAGNESIUM SERPL-MCNC: 2 MG/DL — SIGNIFICANT CHANGE UP (ref 1.6–2.6)
PHOSPHATE SERPL-MCNC: 3.8 MG/DL — SIGNIFICANT CHANGE UP (ref 2.5–4.5)
POTASSIUM SERPL-MCNC: 3.8 MMOL/L — SIGNIFICANT CHANGE UP (ref 3.5–5.3)
POTASSIUM SERPL-SCNC: 3.8 MMOL/L — SIGNIFICANT CHANGE UP (ref 3.5–5.3)
PROT SERPL-MCNC: 6.6 G/DL — SIGNIFICANT CHANGE UP (ref 6–8.3)
SODIUM SERPL-SCNC: 138 MMOL/L — SIGNIFICANT CHANGE UP (ref 135–145)

## 2023-09-02 RX ADMIN — PANTOPRAZOLE SODIUM 40 MILLIGRAM(S): 20 TABLET, DELAYED RELEASE ORAL at 23:43

## 2023-09-02 RX ADMIN — HEPARIN SODIUM 5000 UNIT(S): 5000 INJECTION INTRAVENOUS; SUBCUTANEOUS at 23:50

## 2023-09-02 RX ADMIN — SODIUM CHLORIDE 125 MILLILITER(S): 9 INJECTION, SOLUTION INTRAVENOUS at 12:03

## 2023-09-02 RX ADMIN — PANTOPRAZOLE SODIUM 40 MILLIGRAM(S): 20 TABLET, DELAYED RELEASE ORAL at 12:02

## 2023-09-02 RX ADMIN — Medication 1 DROP(S): at 12:03

## 2023-09-02 RX ADMIN — Medication 1 DROP(S): at 23:52

## 2023-09-02 RX ADMIN — HEPARIN SODIUM 5000 UNIT(S): 5000 INJECTION INTRAVENOUS; SUBCUTANEOUS at 12:03

## 2023-09-02 NOTE — PROGRESS NOTE ADULT - ASSESSMENT
39 y/o  @36w3d admitted for inability to tolerate PO and workup of dysphagia for 2 months. Pregnancy c/b IUGR (3%, AC 2%, UAD wnl). Workup thus far unremarkable. Patient continues to report same baseline complaints of throat discomfort, now on pureed diet and tube feeds. Currently in stable condition without complaints overnight.    #Dysphagia  - Workup unremarkable, recommending tube feeds  - GI: no identified cause, signed off  - EGD w/ motility study (): normal RAC pattern, with additional KO feeding tube placement  - Barium esophagram (): able to swallow contrast and barium pill without difficulty, no stricture or mass effect noted  - Viral and auto immune labs without identified cause    - ENT: flexible endoscope unremarkable, no acute intervention, f/u  outpatient  - Daily weights: admission weight 56.6kg, most recently 55.4kg   - Nutrition: 12hr tube feeds (-)   - TPN consult (): recommend tube feeds over TPN  - NGT placement confirmed by multiple CXR  - Neuro: MRI Head declined per patient, myasthenia labs still pending this AM    #Transaminitis  - Improving, resolved  - AST/ALT: 111/123 ->>52/62->>107/81->>57/50->86/67->101/82->>32/43  - Lipase: 94  - RUQ sono (): WNL    #Severe IUGR  - ATU (): Breech, ant, LIDIA 9.12 BPP 8/8 UAD wnl.  - ATU (): Breech, ant, LIDIA 6.32, BPP 8/8 UAD wnl   - ATU (): Breech, anterior, LIDIA 17.23, UAD   - ATU (8/15): Breech, anterior, LIDIA 10.8, 1787g (3%, AC 2%, HC 1%) UAD wnl   - Repeat growth 3-4 weeks from prior    #Fetal Wellbeing  - NST BID  - BPP 2x/wk  - PNV    #Maternal Wellbeing  - HSQ for DVT ppx  - SW: recommend psychiatric evaluation  - Psychiatry: recommend anxiety medication (pt declines)  - C/w tube feeds per nutrition  - Case management planning    Luma Oconnor  PGY3

## 2023-09-02 NOTE — PROGRESS NOTE ADULT - SUBJECTIVE AND OBJECTIVE BOX
R3 Antepartum Note    Patient seen and examined at bedside, no acute overnight events. No acute complaints. Pt reports +FM, denies LOF, VB, contractions, HA, epigastric pain, blurred vision, CP, SOB, N/V, fevers, and chills. Continues to note same mild abdominal discomfort at time, but not at time of evaluation.    Vital Signs Last 24 Hours  T(C): 37.3 (09-02-23 @ 01:44), Max: 37.3 (09-02-23 @ 01:44)  HR: 78 (09-02-23 @ 01:44) (72 - 93)  BP: 111/70 (09-02-23 @ 01:44) (93/50 - 111/70)  RR: 17 (09-02-23 @ 01:44) (17 - 18)  SpO2: 98% (09-02-23 @ 01:44) (98% - 100%)    Physical Exam:  General: NAD  Abdomen: Soft, non-tender, gravid  Ext: No pain or swelling    NST reactive overnight    Labs:             8.4    5.51  )-----------( 166      ( 09-01 @ 06:08 )             26.6     09-01 @ 06:08    137  |  103  |  3   ----------------------------<  86  3.4   |  23  |  0.51    Ca    8.5      09-01 @ 06:08    TPro  6.1  /  Alb  3.0  /  TBili  0.4  /  DBili  x   /  AST  32  /  ALT  43  /  AlkPhos  131  09-01 @ 06:08    MEDICATIONS  (STANDING):  acetaminophen     Tablet .. 975 milliGRAM(s) Oral once  artificial tears (preservative free) Ophthalmic Solution 1 Drop(s) Both EYES four times a day  ferrous    sulfate Liquid 300 milliGRAM(s) Enteral Tube daily  heparin   Injectable 5000 Unit(s) SubCutaneous every 12 hours  pantoprazole  Injectable 40 milliGRAM(s) IV Push every 12 hours  senna Syrup 5 milliLiter(s) Oral once  sodium chloride 0.9% 1000 milliLiter(s) (125 mL/Hr) IV Continuous <Continuous>    MEDICATIONS  (PRN):  hydrocortisone 1% Cream 1 Application(s) Topical three times a day PRN Rash and/or Itching

## 2023-09-03 ENCOUNTER — RESULT REVIEW (OUTPATIENT)
Age: 38
End: 2023-09-03

## 2023-09-03 LAB
APTT BLD: 49.4 SEC — HIGH (ref 24.5–35.6)
BLD GP AB SCN SERPL QL: NEGATIVE — SIGNIFICANT CHANGE UP
HCT VFR BLD CALC: 28.2 % — LOW (ref 34.5–45)
HGB BLD-MCNC: 9.2 G/DL — LOW (ref 11.5–15.5)
INR BLD: 0.99 RATIO — SIGNIFICANT CHANGE UP (ref 0.85–1.18)
MCHC RBC-ENTMCNC: 27.1 PG — SIGNIFICANT CHANGE UP (ref 27–34)
MCHC RBC-ENTMCNC: 32.6 GM/DL — SIGNIFICANT CHANGE UP (ref 32–36)
MCV RBC AUTO: 83.2 FL — SIGNIFICANT CHANGE UP (ref 80–100)
NRBC # BLD: 0 /100 WBCS — SIGNIFICANT CHANGE UP (ref 0–0)
NRBC # FLD: 0 K/UL — SIGNIFICANT CHANGE UP (ref 0–0)
PLATELET # BLD AUTO: 189 K/UL — SIGNIFICANT CHANGE UP (ref 150–400)
PROTHROM AB SERPL-ACNC: 11.1 SEC — SIGNIFICANT CHANGE UP (ref 9.5–13)
RBC # BLD: 3.39 M/UL — LOW (ref 3.8–5.2)
RBC # FLD: 16.1 % — HIGH (ref 10.3–14.5)
RH IG SCN BLD-IMP: POSITIVE — SIGNIFICANT CHANGE UP
WBC # BLD: 7.25 K/UL — SIGNIFICANT CHANGE UP (ref 3.8–10.5)
WBC # FLD AUTO: 7.25 K/UL — SIGNIFICANT CHANGE UP (ref 3.8–10.5)

## 2023-09-03 PROCEDURE — 59510 CESAREAN DELIVERY: CPT | Mod: U7,GC

## 2023-09-03 PROCEDURE — 88307 TISSUE EXAM BY PATHOLOGIST: CPT | Mod: 26

## 2023-09-03 RX ORDER — SODIUM CHLORIDE 9 MG/ML
1000 INJECTION, SOLUTION INTRAVENOUS
Refills: 0 | Status: DISCONTINUED | OUTPATIENT
Start: 2023-09-03 | End: 2023-09-03

## 2023-09-03 RX ORDER — CITRIC ACID/SODIUM CITRATE 300-500 MG
30 SOLUTION, ORAL ORAL ONCE
Refills: 0 | Status: COMPLETED | OUTPATIENT
Start: 2023-09-03 | End: 2023-09-03

## 2023-09-03 RX ORDER — SIMETHICONE 80 MG/1
80 TABLET, CHEWABLE ORAL EVERY 4 HOURS
Refills: 0 | Status: DISCONTINUED | OUTPATIENT
Start: 2023-09-03 | End: 2023-09-08

## 2023-09-03 RX ORDER — OXYTOCIN 10 UNIT/ML
333.33 VIAL (ML) INJECTION
Qty: 20 | Refills: 0 | Status: DISCONTINUED | OUTPATIENT
Start: 2023-09-03 | End: 2023-09-03

## 2023-09-03 RX ORDER — OXYTOCIN 10 UNIT/ML
333.33 VIAL (ML) INJECTION
Qty: 20 | Refills: 0 | Status: DISCONTINUED | OUTPATIENT
Start: 2023-09-03 | End: 2023-09-05

## 2023-09-03 RX ORDER — ACETAMINOPHEN 500 MG
650 TABLET ORAL EVERY 6 HOURS
Refills: 0 | Status: DISCONTINUED | OUTPATIENT
Start: 2023-09-03 | End: 2023-09-04

## 2023-09-03 RX ORDER — ACETAMINOPHEN 500 MG
830 TABLET ORAL EVERY 6 HOURS
Refills: 0 | Status: DISCONTINUED | OUTPATIENT
Start: 2023-09-03 | End: 2023-09-03

## 2023-09-03 RX ORDER — CHLORHEXIDINE GLUCONATE 213 G/1000ML
1 SOLUTION TOPICAL DAILY
Refills: 0 | Status: DISCONTINUED | OUTPATIENT
Start: 2023-09-03 | End: 2023-09-03

## 2023-09-03 RX ORDER — FAMOTIDINE 10 MG/ML
20 INJECTION INTRAVENOUS ONCE
Refills: 0 | Status: COMPLETED | OUTPATIENT
Start: 2023-09-03 | End: 2023-09-03

## 2023-09-03 RX ORDER — DIPHENHYDRAMINE HCL 50 MG
25 CAPSULE ORAL EVERY 6 HOURS
Refills: 0 | Status: DISCONTINUED | OUTPATIENT
Start: 2023-09-03 | End: 2023-09-08

## 2023-09-03 RX ORDER — ACETAMINOPHEN 500 MG
975 TABLET ORAL
Refills: 0 | Status: DISCONTINUED | OUTPATIENT
Start: 2023-09-03 | End: 2023-09-04

## 2023-09-03 RX ORDER — LANOLIN
1 OINTMENT (GRAM) TOPICAL EVERY 6 HOURS
Refills: 0 | Status: DISCONTINUED | OUTPATIENT
Start: 2023-09-03 | End: 2023-09-08

## 2023-09-03 RX ORDER — MAGNESIUM HYDROXIDE 400 MG/1
30 TABLET, CHEWABLE ORAL
Refills: 0 | Status: DISCONTINUED | OUTPATIENT
Start: 2023-09-03 | End: 2023-09-08

## 2023-09-03 RX ORDER — OXYCODONE HYDROCHLORIDE 5 MG/1
5 TABLET ORAL ONCE
Refills: 0 | Status: DISCONTINUED | OUTPATIENT
Start: 2023-09-03 | End: 2023-09-08

## 2023-09-03 RX ORDER — IBUPROFEN 200 MG
600 TABLET ORAL EVERY 6 HOURS
Refills: 0 | Status: DISCONTINUED | OUTPATIENT
Start: 2023-09-03 | End: 2023-09-08

## 2023-09-03 RX ORDER — SODIUM CHLORIDE 9 MG/ML
1000 INJECTION, SOLUTION INTRAVENOUS
Refills: 0 | Status: DISCONTINUED | OUTPATIENT
Start: 2023-09-03 | End: 2023-09-04

## 2023-09-03 RX ORDER — KETOROLAC TROMETHAMINE 30 MG/ML
30 SYRINGE (ML) INJECTION EVERY 6 HOURS
Refills: 0 | Status: DISCONTINUED | OUTPATIENT
Start: 2023-09-03 | End: 2023-09-04

## 2023-09-03 RX ORDER — CITRIC ACID/SODIUM CITRATE 300-500 MG
15 SOLUTION, ORAL ORAL ONCE
Refills: 0 | Status: COMPLETED | OUTPATIENT
Start: 2023-09-03 | End: 2023-09-03

## 2023-09-03 RX ORDER — OXYCODONE HYDROCHLORIDE 5 MG/1
5 TABLET ORAL
Refills: 0 | Status: DISCONTINUED | OUTPATIENT
Start: 2023-09-03 | End: 2023-09-08

## 2023-09-03 RX ORDER — SODIUM CHLORIDE 9 MG/ML
1000 INJECTION, SOLUTION INTRAVENOUS ONCE
Refills: 0 | Status: DISCONTINUED | OUTPATIENT
Start: 2023-09-03 | End: 2023-09-03

## 2023-09-03 RX ORDER — TETANUS TOXOID, REDUCED DIPHTHERIA TOXOID AND ACELLULAR PERTUSSIS VACCINE, ADSORBED 5; 2.5; 8; 8; 2.5 [IU]/.5ML; [IU]/.5ML; UG/.5ML; UG/.5ML; UG/.5ML
0.5 SUSPENSION INTRAMUSCULAR ONCE
Refills: 0 | Status: DISCONTINUED | OUTPATIENT
Start: 2023-09-03 | End: 2023-09-08

## 2023-09-03 RX ORDER — FOLIC ACID 0.8 MG
1 TABLET ORAL DAILY
Refills: 0 | Status: DISCONTINUED | OUTPATIENT
Start: 2023-09-03 | End: 2023-09-05

## 2023-09-03 RX ORDER — SODIUM CHLORIDE 9 MG/ML
1000 INJECTION, SOLUTION INTRAVENOUS
Refills: 0 | Status: DISCONTINUED | OUTPATIENT
Start: 2023-09-03 | End: 2023-09-05

## 2023-09-03 RX ORDER — HEPARIN SODIUM 5000 [USP'U]/ML
5000 INJECTION INTRAVENOUS; SUBCUTANEOUS EVERY 12 HOURS
Refills: 0 | Status: DISCONTINUED | OUTPATIENT
Start: 2023-09-03 | End: 2023-09-08

## 2023-09-03 RX ORDER — FERROUS SULFATE 325(65) MG
325 TABLET ORAL DAILY
Refills: 0 | Status: DISCONTINUED | OUTPATIENT
Start: 2023-09-03 | End: 2023-09-05

## 2023-09-03 RX ADMIN — CHLORHEXIDINE GLUCONATE 1 APPLICATION(S): 213 SOLUTION TOPICAL at 05:58

## 2023-09-03 RX ADMIN — Medication 15 MILLILITER(S): at 07:55

## 2023-09-03 RX ADMIN — SODIUM CHLORIDE 75 MILLILITER(S): 9 INJECTION, SOLUTION INTRAVENOUS at 11:49

## 2023-09-03 RX ADMIN — Medication 1 DROP(S): at 21:35

## 2023-09-03 RX ADMIN — Medication 30 MILLIGRAM(S): at 20:45

## 2023-09-03 RX ADMIN — Medication 650 MILLIGRAM(S): at 11:48

## 2023-09-03 RX ADMIN — Medication 30 MILLIGRAM(S): at 16:00

## 2023-09-03 RX ADMIN — Medication 30 MILLIGRAM(S): at 21:15

## 2023-09-03 RX ADMIN — Medication 1000 MILLIUNIT(S)/MIN: at 11:49

## 2023-09-03 RX ADMIN — HEPARIN SODIUM 5000 UNIT(S): 5000 INJECTION INTRAVENOUS; SUBCUTANEOUS at 18:08

## 2023-09-03 RX ADMIN — Medication 650 MILLIGRAM(S): at 18:07

## 2023-09-03 RX ADMIN — Medication 30 MILLIGRAM(S): at 15:27

## 2023-09-03 RX ADMIN — Medication 650 MILLIGRAM(S): at 12:15

## 2023-09-03 RX ADMIN — SODIUM CHLORIDE 125 MILLILITER(S): 9 INJECTION, SOLUTION INTRAVENOUS at 05:58

## 2023-09-03 RX ADMIN — FAMOTIDINE 20 MILLIGRAM(S): 10 INJECTION INTRAVENOUS at 06:02

## 2023-09-03 NOTE — OB NEONATOLOGY/PEDIATRICIAN DELIVERY SUMMARY - NSPEDSNEONOTESA_OBGYN_ALL_OB_FT
Peds called to OR for IUGR, mother with malnutrition and breech presentation.  36+4 wk SGA male born via CS to a  35y/o  mother.  Maternal history of GERD and COVID (2023). Prenatal history of malnutrition due to inability to swallow and eat after having COVID in 2023. Malnutrition lead t G tube trial 7, endoscopy negative, barium swallow negative, requiring Potassium and banana bag. Maternal labs include Blood Type A+ , HIV - , RPR NR , Rubella I , Hep B - , GBS - (), COVID + In 2023 ROM at __ with clear / meconium fluids (ROM hours: 7h5min). Baby emerged vigorous, crying, was warmed, dried suctioned and stimulated with APGARS of 7/8 for tone and color. Resuscitation included: deep suctioning and CPAP for 1 hour. Mom plans to initiate breastfeeding, consents Hep B vaccine and consents circ.  Highest maternal temp: 36.1 . EOS 0.1 .       TOB 0906  BW 2130g

## 2023-09-03 NOTE — OB PROVIDER DELIVERY SUMMARY - NS_SCHEDBEFORE39_OBGYN_ALL_OB
Malpresentation/Premature Rupture of Membranes (PROM) IUGR/Malpresentation/Premature Rupture of Membranes (PROM)

## 2023-09-03 NOTE — OB RN DELIVERY SUMMARY - NS_SEPSISRSKCALC_OBGYN_ALL_OB_FT
No temperature has been documented for this patient in CPN or on the OB Flowsheet. Ensure the highest temperature during labor was documented on the OB Flowsheet.   EOS calculated successfully. EOS Risk Factor: 0.5/1000 live births ([facility] national incidence); GA=36w4d; Temp=97.5; ROM=7.083; GBS='Unknown'; Antibiotics='No antibiotics or any antibiotics < 2 hrs prior to birth'

## 2023-09-03 NOTE — OB PROVIDER DELIVERY SUMMARY - NSSELHIDDEN_OBGYN_ALL_OB_FT
[NS_DeliveryAttending1_OBGYN_ALL_OB_FT:[id number]==],[NS_DeliveryAssist1_OBGYN_ALL_OB_FT:[id number]],[NS_DeliveryRN_OBGYN_ALL_OB_FT:[id number]=]

## 2023-09-03 NOTE — OB PROVIDER DELIVERY SUMMARY - NSBEGANLABOR_OBGYN_A_OB
5/18/2021         Blas Solo  [address]  [facility] [address]        Dear Blas,     This is a reminder that it is time for you to schedule an appointment with Ant Rosales MD  for a physical exam.     Please call the clinic at [phone] and ask for the [facility] scheduling desk to ensure a time that is convenient for you.    Sincerely,    The office of Ant Rosales MD.    [facility]  [address] [place] [id number] [phone]      
N/A

## 2023-09-03 NOTE — OB PROVIDER DELIVERY SUMMARY - NSLOWPPHRISK_OBGYN_A_OB
No previous uterine incision/Holden Pregnancy/Less than or equal to 4 previous vaginal births/No known bleeding disorder/No history of postpartum hemorrhage

## 2023-09-03 NOTE — CHART NOTE - NSCHARTNOTEFT_GEN_A_CORE
Pt complaining of leakage since going to the around 2am.   SSE with pooling, nitrazine positive, ferning positive  TAUS(9/3): breech, anterior placenta   VE 0/0/-3 intermittent contractions q15m    - send STAT T&S, labs  - plan for pLTCS for breech    Amyeo Afroz Jereen, PGY-3  d/w Dr Cotter

## 2023-09-03 NOTE — OB RN DELIVERY SUMMARY - NSSELHIDDEN_OBGYN_ALL_OB_FT
[NS_DeliveryAttending1_OBGYN_ALL_OB_FT:[id number]==],[NS_DeliveryAssist1_OBGYN_ALL_OB_FT:[id number]=],[NS_DeliveryRN_OBGYN_ALL_OB_FT:[id number]=]

## 2023-09-03 NOTE — OB PROVIDER DELIVERY SUMMARY - NSPROVIDERDELIVERYNOTE_OBGYN_ALL_OB_FT
Procedure: pLTCS  Preop Dx: IUP@36w4d, breech presentation, PPROM  viable M infant, cephalic presentation, weight 2130g, APGARS 7/8  grossly normal uterus, b/l tubes and ovaries  Layers of uterine closure:  hysterotomy closed in 2 layers  peritoneum closed with 2.0 vicryl suture    Complications: none  Specimen: none   Findings: as above  Hemostatic/intraoperative agents: none  179/1100/300    Dictation #: Procedure: pLTCS  Preop Dx: IUP@36w4d, violeta breech presentation, PPROM  viable M infant, violeta breech presentation, nuchal cord x1 easily reduced,  weight 2130g, APGARS 7/8  grossly normal uterus, b/l tubes and ovaries  Layers of uterine closure:  hysterotomy closed in 2 layers  peritoneum closed with 2.0 vicryl suture    Complications: none  Specimen: none   Findings: as above  Hemostatic/intraoperative agents: none  179/1100/300    Dictation #:[id number]

## 2023-09-03 NOTE — OB RN DELIVERY SUMMARY - NS_NEWBORNAALIVE_OBGYN_ALL_OB
Monitor: The patient is to obtain a yearly mammogram.  Evaluation: Mammogram ordered, refer to orders.  Assessment/Treatment:  Discussed recommendations for targeted population  Implication of breast cancer screening discussed  Patient decided to proceed with breast cancer screening  Mammogram ordered, refer to orders.   Yes

## 2023-09-03 NOTE — OB RN INTRAOPERATIVE NOTE - NSSELHIDDEN_OBGYN_ALL_OB_FT
[NS_DeliveryAttending1_OBGYN_ALL_OB_FT:[id number]],[NS_DeliveryAssist1_OBGYN_ALL_OB_FT:[id number]=],[NS_DeliveryRN_OBGYN_ALL_OB_FT:[id number]=]

## 2023-09-03 NOTE — CHART NOTE - NSCHARTNOTEFT_GEN_A_CORE
R3 Chart Note    Per RN and resident on day shift, patient tube feed unable to be sent up per Dietary/Nutrition as patient has listed soy allergy with hives in chart. Confirmed allergy with patient however patient has been tolerating most recently formulation without difficulty.    Attempted to contact inpatient pharmacy, subsequently redirected to Dietary/Nutrition with no answers to phone call or page ([phone] as provider by  and pager #[phone]) after multiple attempts. Will re-address in the AM but hold feeds for now.    Of note patient noted by RN to be tolerating soup PO.    d/w attending Dr Vahe Oconnor  PGY3

## 2023-09-04 ENCOUNTER — TRANSCRIPTION ENCOUNTER (OUTPATIENT)
Age: 38
End: 2023-09-04

## 2023-09-04 LAB
ALBUMIN SERPL ELPH-MCNC: 3 G/DL — LOW (ref 3.3–5)
ALP SERPL-CCNC: 129 U/L — HIGH (ref 40–120)
ALT FLD-CCNC: 37 U/L — HIGH (ref 4–33)
ANION GAP SERPL CALC-SCNC: 13 MMOL/L — SIGNIFICANT CHANGE UP (ref 7–14)
AST SERPL-CCNC: 44 U/L — HIGH (ref 4–32)
BASOPHILS # BLD AUTO: 0.06 K/UL — SIGNIFICANT CHANGE UP (ref 0–0.2)
BASOPHILS NFR BLD AUTO: 0.5 % — SIGNIFICANT CHANGE UP (ref 0–2)
BILIRUB SERPL-MCNC: 0.5 MG/DL — SIGNIFICANT CHANGE UP (ref 0.2–1.2)
BUN SERPL-MCNC: 6 MG/DL — LOW (ref 7–23)
CALCIUM SERPL-MCNC: 9.1 MG/DL — SIGNIFICANT CHANGE UP (ref 8.4–10.5)
CHLORIDE SERPL-SCNC: 97 MMOL/L — LOW (ref 98–107)
CO2 SERPL-SCNC: 23 MMOL/L — SIGNIFICANT CHANGE UP (ref 22–31)
CREAT SERPL-MCNC: 0.55 MG/DL — SIGNIFICANT CHANGE UP (ref 0.5–1.3)
EGFR: 122 ML/MIN/1.73M2 — SIGNIFICANT CHANGE UP
EOSINOPHIL # BLD AUTO: 0.11 K/UL — SIGNIFICANT CHANGE UP (ref 0–0.5)
EOSINOPHIL NFR BLD AUTO: 0.9 % — SIGNIFICANT CHANGE UP (ref 0–6)
GLUCOSE SERPL-MCNC: 76 MG/DL — SIGNIFICANT CHANGE UP (ref 70–99)
HCT VFR BLD CALC: 28.8 % — LOW (ref 34.5–45)
HGB BLD-MCNC: 9.4 G/DL — LOW (ref 11.5–15.5)
IANC: 8.99 K/UL — HIGH (ref 1.8–7.4)
IMM GRANULOCYTES NFR BLD AUTO: 0.3 % — SIGNIFICANT CHANGE UP (ref 0–0.9)
LYMPHOCYTES # BLD AUTO: 18.8 % — SIGNIFICANT CHANGE UP (ref 13–44)
LYMPHOCYTES # BLD AUTO: 2.28 K/UL — SIGNIFICANT CHANGE UP (ref 1–3.3)
MCHC RBC-ENTMCNC: 26.4 PG — LOW (ref 27–34)
MCHC RBC-ENTMCNC: 32.6 GM/DL — SIGNIFICANT CHANGE UP (ref 32–36)
MCV RBC AUTO: 80.9 FL — SIGNIFICANT CHANGE UP (ref 80–100)
MONOCYTES # BLD AUTO: 0.68 K/UL — SIGNIFICANT CHANGE UP (ref 0–0.9)
MONOCYTES NFR BLD AUTO: 5.6 % — SIGNIFICANT CHANGE UP (ref 2–14)
NEUTROPHILS # BLD AUTO: 8.99 K/UL — HIGH (ref 1.8–7.4)
NEUTROPHILS NFR BLD AUTO: 73.9 % — SIGNIFICANT CHANGE UP (ref 43–77)
NRBC # BLD: 0 /100 WBCS — SIGNIFICANT CHANGE UP (ref 0–0)
NRBC # FLD: 0 K/UL — SIGNIFICANT CHANGE UP (ref 0–0)
PLATELET # BLD AUTO: 192 K/UL — SIGNIFICANT CHANGE UP (ref 150–400)
POTASSIUM SERPL-MCNC: 4 MMOL/L — SIGNIFICANT CHANGE UP (ref 3.5–5.3)
POTASSIUM SERPL-SCNC: 4 MMOL/L — SIGNIFICANT CHANGE UP (ref 3.5–5.3)
PROT SERPL-MCNC: 6.4 G/DL — SIGNIFICANT CHANGE UP (ref 6–8.3)
RBC # BLD: 3.56 M/UL — LOW (ref 3.8–5.2)
RBC # FLD: 15.9 % — HIGH (ref 10.3–14.5)
SODIUM SERPL-SCNC: 133 MMOL/L — LOW (ref 135–145)
WBC # BLD: 12.16 K/UL — HIGH (ref 3.8–10.5)
WBC # FLD AUTO: 12.16 K/UL — HIGH (ref 3.8–10.5)

## 2023-09-04 RX ORDER — OMEPRAZOLE 10 MG/1
1 CAPSULE, DELAYED RELEASE ORAL
Qty: 0 | Refills: 0 | DISCHARGE

## 2023-09-04 RX ORDER — KETOROLAC TROMETHAMINE 30 MG/ML
15 SYRINGE (ML) INJECTION EVERY 6 HOURS
Refills: 0 | Status: DISCONTINUED | OUTPATIENT
Start: 2023-09-04 | End: 2023-09-08

## 2023-09-04 RX ORDER — FERROUS SULFATE 325(65) MG
1 TABLET ORAL
Qty: 0 | Refills: 0 | DISCHARGE
Start: 2023-09-04

## 2023-09-04 RX ORDER — ACETAMINOPHEN 500 MG
1000 TABLET ORAL EVERY 6 HOURS
Refills: 0 | Status: COMPLETED | OUTPATIENT
Start: 2023-09-04 | End: 2023-09-05

## 2023-09-04 RX ORDER — OMEPRAZOLE 10 MG/1
0 CAPSULE, DELAYED RELEASE ORAL
Refills: 0 | DISCHARGE

## 2023-09-04 RX ORDER — ALUMINUM HYDROXIDE AND MAGNESIUM TRISILICATE 80; 14.2 MG/1; MG/1
0 TABLET, CHEWABLE ORAL
Refills: 0 | DISCHARGE

## 2023-09-04 RX ORDER — IBUPROFEN 200 MG
1 TABLET ORAL
Qty: 0 | Refills: 0 | DISCHARGE
Start: 2023-09-04

## 2023-09-04 RX ORDER — ASPIRIN/CALCIUM CARB/MAGNESIUM 324 MG
1 TABLET ORAL
Refills: 0 | DISCHARGE

## 2023-09-04 RX ORDER — PANTOPRAZOLE SODIUM 20 MG/1
40 TABLET, DELAYED RELEASE ORAL EVERY 12 HOURS
Refills: 0 | Status: DISCONTINUED | OUTPATIENT
Start: 2023-09-04 | End: 2023-09-08

## 2023-09-04 RX ADMIN — HEPARIN SODIUM 5000 UNIT(S): 5000 INJECTION INTRAVENOUS; SUBCUTANEOUS at 17:22

## 2023-09-04 RX ADMIN — PANTOPRAZOLE SODIUM 40 MILLIGRAM(S): 20 TABLET, DELAYED RELEASE ORAL at 10:21

## 2023-09-04 RX ADMIN — Medication 30 MILLIGRAM(S): at 10:15

## 2023-09-04 RX ADMIN — Medication 1000 MILLIGRAM(S): at 21:11

## 2023-09-04 RX ADMIN — Medication 650 MILLIGRAM(S): at 06:55

## 2023-09-04 RX ADMIN — Medication 30 MILLIGRAM(S): at 09:51

## 2023-09-04 RX ADMIN — Medication 30 MILLIGRAM(S): at 03:19

## 2023-09-04 RX ADMIN — Medication 650 MILLIGRAM(S): at 14:03

## 2023-09-04 RX ADMIN — Medication 1 DROP(S): at 10:22

## 2023-09-04 RX ADMIN — SODIUM CHLORIDE 125 MILLILITER(S): 9 INJECTION, SOLUTION INTRAVENOUS at 10:22

## 2023-09-04 RX ADMIN — HEPARIN SODIUM 5000 UNIT(S): 5000 INJECTION INTRAVENOUS; SUBCUTANEOUS at 05:57

## 2023-09-04 RX ADMIN — Medication 15 MILLIGRAM(S): at 17:22

## 2023-09-04 RX ADMIN — Medication 1 DROP(S): at 16:46

## 2023-09-04 RX ADMIN — Medication 15 MILLIGRAM(S): at 17:40

## 2023-09-04 RX ADMIN — Medication 1 DROP(S): at 03:15

## 2023-09-04 RX ADMIN — Medication 30 MILLIGRAM(S): at 03:49

## 2023-09-04 RX ADMIN — Medication 650 MILLIGRAM(S): at 15:00

## 2023-09-04 RX ADMIN — Medication 400 MILLIGRAM(S): at 20:41

## 2023-09-04 RX ADMIN — Medication 650 MILLIGRAM(S): at 06:25

## 2023-09-04 NOTE — PROGRESS NOTE ADULT - ASSESSMENT
A/P: 39yo POD#1 s/p LTCS.  Patient is stable and doing well post-operatively.      #Postop from LTCS  - Continue regular diet.  - Increase ambulation.  - Continue motrin, tylenol, oxycodone PRN for pain control  - F/u AM CBC    Deborah Milan MD PGY1

## 2023-09-04 NOTE — DISCHARGE NOTE OB - CARE PROVIDER_API CALL
Charlene Mcgarry  Obstetrics and Gynecology  [address], [place] [address]  Phone: [phone]  Fax: [phone]  Follow Up Time:    Charlene Mcgarry  Obstetrics and Gynecology  [address], [place] [address]  Phone: [phone]  Fax: [phone]  Follow Up Time:     Hollie Escalante  Gastroenterology  [address], [address]  [place] [address]  Phone: [phone]  Fax: [phone]  Follow Up Time:

## 2023-09-04 NOTE — PROGRESS NOTE ADULT - SUBJECTIVE AND OBJECTIVE BOX
OB Progress Note:  Delivery, POD#1    S: 39yo POD#1 s/p LTCS . Her pain is well controlled. She is tolerating a regular diet and passing flatus. Denies N/V. Denies CP/SOB/lightheadedness/dizziness.   She is ambulating without difficulty.   Voiding spontaneously.     O:   Vital Signs Last 24 Hrs  T(C): 36.6 (04 Sep 2023 02:08), Max: 37 (03 Sep 2023 07:30)  T(F): 97.9 (04 Sep 2023 02:08), Max: 98.6 (03 Sep 2023 07:30)  HR: 64 (04 Sep 2023 02:08) (48 - 67)  BP: 121/74 (04 Sep 2023 02:08) (105/66 - 139/78)  BP(mean): 88 (03 Sep 2023 13:30) (67 - 94)  RR: 18 (04 Sep 2023 02:08) (11 - 18)  SpO2: 99% (04 Sep 2023 02:08) (99% - 100%)    Parameters below as of 04 Sep 2023 02:08  Patient On (Oxygen Delivery Method): room air        Labs:  Blood type: A Positive  Rubella IgG: RPR: Negative                          9.2<L>   7.25 >-----------< 189    (  @ 03:19 )             28.2<L>                        8.4<L>   5.51 >-----------< 166    (  @ 06:08 )             26.6<L>    -23 @ 06:11      138  |  100  |  6<L>  ----------------------------<  80  3.8   |  24  |  0.53    - @ 06:08      137  |  103  |  3<L>  ----------------------------<  86  3.4<L>   |  23  |  0.51        Ca    8.8      02 Sep 2023 06:11  Ca    8.5      01 Sep 2023 06:08  Phos  3.8       Mg     2.00         TPro  6.6  /  Alb  3.1<L>  /  TBili  0.4  /  DBili  x   /  AST  30  /  ALT  40<H>  /  AlkPhos  136<H>  23 @ 06:11  TPro  6.1  /  Alb  3.0<L>  /  TBili  0.4  /  DBili  x   /  AST  32  /  ALT  43<H>  /  AlkPhos  131<H>  23 @ 06:08          PE:  General: NAD  Abdomen: Mildly distended, appropriately tender, incision c/d/i, sealed by ***.  Extremities: No erythema, no pitting edema

## 2023-09-04 NOTE — DISCHARGE NOTE OB - MEDICATION SUMMARY - MEDICATIONS TO CHANGE
I will SWITCH the dose or number of times a day I take the medications listed below when I get home from the hospital:    TwoCalHN via NG tube  -- NG tube total Volume 720mL start at 30ml/hr increase by 10 ml every 4 hours . Goal Rate: 60mL/hr Feed time 12 hours (11am-2300)

## 2023-09-04 NOTE — PROGRESS NOTE ADULT - ASSESSMENT
37 y/o  POD#1 from [facility] for rupture of membranes with breech presentation initially admitted for inability to tolerate PO and workup of dysphagia for 2 months. Pregnancy c/b IUGR (3%, AC 2%, UAD wnl). Workup thus far unremarkable. Patient continues to report same baseline complaints of throat discomfort, now on pureed diet and tube feeds. Patient currently in stable condition.    #Dysphagia  - Workup unremarkable, recommending tube feeds  - GI: no identified cause, signed off  - EGD w/ motility study (): normal RAC pattern, with additional KO feeding tube placement  - Barium esophagram (): able to swallow contrast and barium pill without difficulty, no stricture or mass effect noted  - Viral and auto immune labs without identified cause    - ENT: flexible endoscope unremarkable, no acute intervention, f/u outpatient  - Daily weights: admission weight 56.6kg, most recently 55.4kg   - Nutrition: 12hr tube feeds (-); clarify allowed feeds today as pt unable to take previous feed due to reported allergy  - TPN consult (): recommend tube feeds over TPN  - NGT placement confirmed by multiple CXR  - Neuro: MRI Head declined per patient, myasthenia labs still pending this AM    #Transaminitis  - Improving, resolved  - AST/ALT: 111/123 ->>52/62->>107/81->>57/50->86/67->101/82->>32/43  - Lipase: 94  - RUQ sono (): WNL    #Maternal Wellbeing/Routine Post-operative Care  - HSQ for DVT ppx  - C/w PO pain medications when able  - SW: recommend psychiatric evaluation  - Psychiatry: recommend anxiety medication (pt declines)  - C/w tube feeds per nutrition, full liquid diet  - Case management planning    Luma Oconnor  PGY3

## 2023-09-04 NOTE — DISCHARGE NOTE OB - MATERIALS PROVIDED
Vaccinations/[place]  Screening Program/  Immunization Record/Breastfeeding Log/Bottle Feeding Log/Guide to Postpartum Care/Back To Sleep Handout/Shaken Baby Prevention Handout/Breastfeeding Guide and Packet/Birth Certificate Instructions

## 2023-09-04 NOTE — DISCHARGE NOTE OB - PATIENT PORTAL LINK FT
You can access the FollowMyHealth Patient Portal offered by [facility] by registering at the following website: [email or web address]. By joining [email or web address]’s FollowMyHealth portal, you will also be able to view your health information using other applications (apps) compatible with our system.

## 2023-09-04 NOTE — DISCHARGE NOTE OB - PROVIDER TOKENS
PROVIDER:[TOKEN:[[id number]]] PROVIDER:[TOKEN:[[id number]]],PROVIDER:[TOKEN:[[id number]]]

## 2023-09-04 NOTE — DISCHARGE NOTE OB - CARE PROVIDERS DIRECT ADDRESSES
[email or web address]

## 2023-09-04 NOTE — DISCHARGE NOTE OB - NS MD DC FALL RISK RISK
For information on Fall & Injury Prevention, visit: [email or web address] OR  [email or web address] OR  https://www.cdc.gov/steadi/patient.html

## 2023-09-04 NOTE — PROGRESS NOTE ADULT - SUBJECTIVE AND OBJECTIVE BOX
POST OP DAY  1  s/p   SECTION    SUBJECTIVE: Patient doing well    PAIN SCALE SCORE: [x] Refer to charted pain scores    THERAPY:  [X] Spinal morphine   [  ] Epidural morphine   [  ] IV PCA Hydromorphone 1 mg/ml    OBJECTIVE:     SEDATION SCORE:	  [ x ] Alert	    [  ] Drowsy        [  ] Arousable	[  ] Asleep	[  ] Unresponsive    Side Effects:	  [ x ] None	     [  ] Nausea        [  ] Pruritus        [  ] Weakness   [  ] Numbness        ASSESSMENT/ PLAN   [   ] Discontinue         [  ] Continue    [ x ] Change to prn Analgesics as per primary service.    DOCUMENTATION & VERIFICATION OF CURRENT MEDS [ x ] Done    COMMENTS: No Headache.

## 2023-09-04 NOTE — PROGRESS NOTE ADULT - SUBJECTIVE AND OBJECTIVE BOX
Patient seen and examined at bedside, no acute overnight events. No acute complaints, pain well controlled. Patient tolerated some [employer] soup earlier but states that she is now having difficulty sipping again despite being able to a few hours ago. Passing flatus, abbott recently removed by patient request. Denies CP, SOB, N/V, HA, blurred vision, epigastric pain. Patient with additional complaint of throat discomfort, educated on sipping water for comfort.    Vital Signs Last 24 Hours  T(C): 36.6 (09-04-23 @ 02:08), Max: 37 (09-03-23 @ 07:30)  HR: 64 (09-04-23 @ 02:08) (48 - 67)  BP: 121/74 (09-04-23 @ 02:08) (105/66 - 139/78)  RR: 18 (09-04-23 @ 02:08) (11 - 18)  SpO2: 99% (09-04-23 @ 02:08) (99% - 100%)    I&O's Summary    02 Sep 2023 07:01  -  03 Sep 2023 07:00  --------------------------------------------------------  IN: 1470 mL / OUT: 0 mL / NET: 1470 mL    03 Sep 2023 07:01  -  04 Sep 2023 05:42  --------------------------------------------------------  IN: 1900 mL / OUT: 1779 mL / NET: 121 mL    Physical Exam:  General: NAD  Abdomen: Soft, non-tender, non-distended, fundus firm  Incision: Pfannenstiel incision CDI, subcuticular suture closure  Pelvic: Lochia wnl, external exam of perineum clean and dry without swelling    Labs:  Blood Type: A Positive  Antibody Screen: Negative  RPR: Negative               9.2    7.25  )-----------( 189      ( 09-03 @ 03:19 )             28.2                8.4    5.51  )-----------( 166      ( 09-01 @ 06:08 )             26.6                8.7    5.11  )-----------( 156      ( 08-25 @ 09:36 )             27.8     MEDICATIONS  (STANDING):  acetaminophen     Tablet .. 975 milliGRAM(s) Oral <User Schedule>  acetaminophen   Oral Liquid .. 650 milliGRAM(s) Oral every 6 hours  artificial tears (preservative free) Ophthalmic Solution 1 Drop(s) Both EYES four times a day  diphtheria/tetanus/pertussis (acellular) Vaccine (Adacel) 0.5 milliLiter(s) IntraMuscular once  ferrous    sulfate 325 milliGRAM(s) Oral daily  folic acid 1 milliGRAM(s) Oral daily  heparin   Injectable 5000 Unit(s) SubCutaneous every 12 hours  ibuprofen  Tablet. 600 milliGRAM(s) Oral every 6 hours  ketorolac   Injectable 30 milliGRAM(s) IV Push every 6 hours  lactated ringers. 1000 milliLiter(s) (125 mL/Hr) IV Continuous <Continuous>  oxytocin Infusion 333.333 milliUNIT(s)/Min (1000 mL/Hr) IV Continuous <Continuous>  prenatal multivitamin 1 Tablet(s) Oral daily  sodium chloride 0.9% 1000 milliLiter(s) (125 mL/Hr) IV Continuous <Continuous>    MEDICATIONS  (PRN):  diphenhydrAMINE 25 milliGRAM(s) Oral every 6 hours PRN Pruritus  lanolin Ointment 1 Application(s) Topical every 6 hours PRN Sore Nipples  magnesium hydroxide Suspension 30 milliLiter(s) Oral two times a day PRN Constipation  oxyCODONE    IR 5 milliGRAM(s) Oral once PRN Moderate to Severe Pain (4-10)  oxyCODONE    IR 5 milliGRAM(s) Oral every 3 hours PRN Moderate to Severe Pain (4-10)  simethicone 80 milliGRAM(s) Chew every 4 hours PRN Gas

## 2023-09-04 NOTE — DISCHARGE NOTE OB - ADDITIONAL INSTRUCTIONS
in 6wks with Dr Mcgarry in 6wks with Dr Mcgarry  Continue pantoprazole 40 mg 2 times per day  Monitor caloric intake : follow up with Dietician   Follow up with her GI or Dr. Hollie Escalante after discharge [phone])

## 2023-09-04 NOTE — DISCHARGE NOTE OB - MEDICATION SUMMARY - MEDICATIONS TO TAKE
I will START or STAY ON the medications listed below when I get home from the hospital:    ibuprofen 600 mg oral tablet  -- 1 tab(s) by mouth every 6 hours as needed for  moderate pain  -- Indication: For for pain    Prenatal Multivitamins with Folic Acid 1 mg oral tablet  -- 1 tab(s) by mouth once a day  -- Indication: For home med    ferrous sulfate 325 mg (65 mg elemental iron) oral tablet  -- 1 tab(s) by mouth once a day  -- Indication: For home med

## 2023-09-04 NOTE — DISCHARGE NOTE OB - HOSPITAL COURSE
Admitted at 34wks with inability to swallow after having COVID weeks prior, eval by ENT and GI with negative workup,Pt has lost 11 lbs in 2 weeks.  IUGR fetus noted. Tube feedings started.  At 36+wks pt had PPROM with breech presenting fetus and underwent a primary C/S delivering a viable male infant.  Tolerated pureed foods.  Uncomplicated post op course Admitted at 34wks with inability to swallow after having COVID weeks prior, eval by ENT and GI with negative workup,Pt has lost 11 lbs in 2 weeks.  IUGR fetus noted. Tube feedings started.  At 36+wks pt had PPROM with breech presenting fetus and underwent a primary C/S delivering a viable male infant.  Tolerated pureed foods.  Uncomplicated post op course   follow up recs: Continue pantoprazole 40 mg BID on d/c  - Monitor caloric intake now that she is off TFs  - Patient can follow up with her GI or Dr. Hollie Escalante after discharge [phone])

## 2023-09-05 RX ORDER — FERROUS SULFATE 325(65) MG
325 TABLET ORAL DAILY
Refills: 0 | Status: DISCONTINUED | OUTPATIENT
Start: 2023-09-05 | End: 2023-09-08

## 2023-09-05 RX ORDER — FOLIC ACID 0.8 MG
1 TABLET ORAL DAILY
Refills: 0 | Status: DISCONTINUED | OUTPATIENT
Start: 2023-09-05 | End: 2023-09-08

## 2023-09-05 RX ADMIN — Medication 15 MILLIGRAM(S): at 12:13

## 2023-09-05 RX ADMIN — Medication 15 MILLIGRAM(S): at 17:07

## 2023-09-05 RX ADMIN — HEPARIN SODIUM 5000 UNIT(S): 5000 INJECTION INTRAVENOUS; SUBCUTANEOUS at 05:50

## 2023-09-05 RX ADMIN — Medication 15 MILLIGRAM(S): at 05:48

## 2023-09-05 RX ADMIN — MAGNESIUM HYDROXIDE 30 MILLILITER(S): 400 TABLET, CHEWABLE ORAL at 10:02

## 2023-09-05 RX ADMIN — Medication 15 MILLIGRAM(S): at 11:43

## 2023-09-05 RX ADMIN — Medication 15 MILLIGRAM(S): at 06:18

## 2023-09-05 RX ADMIN — Medication 1 DROP(S): at 17:07

## 2023-09-05 RX ADMIN — PANTOPRAZOLE SODIUM 40 MILLIGRAM(S): 20 TABLET, DELAYED RELEASE ORAL at 05:50

## 2023-09-05 RX ADMIN — Medication 1000 MILLIGRAM(S): at 02:57

## 2023-09-05 RX ADMIN — Medication 15 MILLIGRAM(S): at 17:37

## 2023-09-05 RX ADMIN — Medication 1 DROP(S): at 06:11

## 2023-09-05 RX ADMIN — Medication 15 MILLIGRAM(S): at 01:00

## 2023-09-05 RX ADMIN — PANTOPRAZOLE SODIUM 40 MILLIGRAM(S): 20 TABLET, DELAYED RELEASE ORAL at 17:07

## 2023-09-05 RX ADMIN — Medication 400 MILLIGRAM(S): at 02:27

## 2023-09-05 RX ADMIN — HEPARIN SODIUM 5000 UNIT(S): 5000 INJECTION INTRAVENOUS; SUBCUTANEOUS at 17:07

## 2023-09-05 RX ADMIN — Medication 1 DROP(S): at 00:31

## 2023-09-05 RX ADMIN — Medication 15 MILLIGRAM(S): at 00:30

## 2023-09-05 RX ADMIN — Medication 1 DROP(S): at 11:44

## 2023-09-05 NOTE — LACTATION INITIAL EVALUATION - LACTATION INTERVENTIONS
Triple feeding instructions, verbal /written reviewed with patient ./initiate/review safe skin-to-skin/initiate/review hand expression/initiate/review pumping guidelines and safe milk handling/initiate/review techniques for position and latch/post discharge community resources provided/initiate/review supplementation plan due to medical indications/review techniques to increase milk supply/review techniques to manage sore nipples/engorgement/initiate/review breast massage/compression/reviewed components of an effective feeding and at least 8 effective feedings per day required/reviewed importance of monitoring infant diapers, the breastfeeding log, and minimum output each day/reviewed strategies to transition to breastfeeding only/reviewed benefits and recommendations for rooming in/reviewed feeding on demand/by cue at least 8 times a day

## 2023-09-05 NOTE — PROGRESS NOTE ADULT - ASSESSMENT
37 y/o  POD#2 from [facility] for rupture of membranes with breech presentation initially admitted for inability to tolerate PO and workup of dysphagia for 2 months. Pregnancy c/b IUGR (3%, AC 2%, UAD wnl). Workup thus far unremarkable. Patient continues to report same baseline complaints of throat discomfort, now on pureed diet and tube feeds. Patient currently in stable condition.    #Dysphagia  - Workup unremarkable, recommending tube feeds  - GI: no identified cause, signed off  - EGD w/ motility study (): normal RAC pattern, with additional KO feeding tube placement  - Barium esophagram (): able to swallow contrast and barium pill without difficulty, no stricture or mass effect noted  - Viral and auto immune labs without identified cause    - ENT: flexible endoscope unremarkable, no acute intervention, f/u outpatient  - Daily weights: admission weight 56.6kg, most recently 55.4kg   - Nutrition: 12hr tube feeds (-); clarify allowed feeds today as pt unable to take previous feed due to reported allergy  - TPN consult (): recommend tube feeds over TPN  - NGT placement confirmed by multiple CXR  - Neuro: MRI Head declined per patient, myasthenia labs still pending this AM    #Transaminitis  - Improving, resolved  - AST/ALT: 111/123 ->>52/62->>107/81->>57/50->86/67->101/82->>32/43  - Lipase: 94  - RUQ sono (): WNL    #Maternal Wellbeing/Routine Post-operative Care  - HSQ for DVT ppx  - C/w PO pain medications when able  - SW: recommend psychiatric evaluation  - Psychiatry: recommend anxiety medication (pt declines)  - C/w tube feeds per nutrition, full liquid diet  - Case management planning    ELIEZER Wyatt, PGY-3

## 2023-09-05 NOTE — PROGRESS NOTE ADULT - SUBJECTIVE AND OBJECTIVE BOX
R3 Antepartum Note    Patient seen and examined at bedside, no acute overnight events. No acute complaints, pain well controlled. Passing flatus, abbott recently removed by patient request. Denies CP, SOB, N/V, HA, blurred vision, epigastric pain.    Vital Signs Last 24 Hours  T(C): 36.9 (09-05-23 @ 13:56), Max: 37 (09-05-23 @ 10:00)  HR: 90 (09-05-23 @ 13:56) (74 - 90)  BP: 103/67 (09-05-23 @ 13:56) (100/65 - 128/71)  RR: 17 (09-05-23 @ 13:56) (17 - 18)  SpO2: 100% (09-05-23 @ 13:56) (100% - 100%)    CAPILLARY BLOOD GLUCOSE          Physical Exam:  General: NAD  Abdomen: Soft, non-tender, non-distended, fundus firm  Incision: Pfannenstiel incision CDI, subcuticular suture closure  Pelvic: Lochia wnl, external exam of perineum clean and dry without swelling    Labs:             9.4    12.16 )-----------( 192      ( 09-04 @ 06:22 )             28.8     09-04 @ 06:22    133  |  97  |  6   ----------------------------<  76  4.0   |  23  |  0.55    Ca    9.1      09-04 @ 06:22    TPro  6.4  /  Alb  3.0  /  TBili  0.5  /  DBili  x   /  AST  44  /  ALT  37  /  AlkPhos  129  09-04 @ 06:22    PT/INR - ( 09-03 @ 03:19 )   PT: 11.1 sec;   INR: 0.99 ratio    PTT - ( 09-03 @ 03:19 )  PTT:49.4 sec        MEDICATIONS  (STANDING):  artificial tears (preservative free) Ophthalmic Solution 1 Drop(s) Both EYES four times a day  diphtheria/tetanus/pertussis (acellular) Vaccine (Adacel) 0.5 milliLiter(s) IntraMuscular once  ferrous    sulfate 325 milliGRAM(s) Oral daily  folic acid 1 milliGRAM(s) Enteral Tube daily  heparin   Injectable 5000 Unit(s) SubCutaneous every 12 hours  ibuprofen  Tablet. 600 milliGRAM(s) Oral every 6 hours  ketorolac   Injectable 15 milliGRAM(s) IV Push every 6 hours  pantoprazole  Injectable 40 milliGRAM(s) IV Push every 12 hours  prenatal multivitamin 1 Tablet(s) Oral daily    MEDICATIONS  (PRN):  diphenhydrAMINE 25 milliGRAM(s) Oral every 6 hours PRN Pruritus  lanolin Ointment 1 Application(s) Topical every 6 hours PRN Sore Nipples  magnesium hydroxide Suspension 30 milliLiter(s) Oral two times a day PRN Constipation  oxyCODONE    IR 5 milliGRAM(s) Oral once PRN Moderate to Severe Pain (4-10)  oxyCODONE    IR 5 milliGRAM(s) Oral every 3 hours PRN Moderate to Severe Pain (4-10)  simethicone 80 milliGRAM(s) Chew every 4 hours PRN Gas

## 2023-09-05 NOTE — LACTATION INITIAL EVALUATION - POTENTIAL FOR
ineffective breastfeeding/sore breast/s/engorgement/knowledge deficit/plugged ducts/feeding confusion/latch on difficulty/low supply

## 2023-09-06 ENCOUNTER — APPOINTMENT (OUTPATIENT)
Dept: OBGYN | Facility: CLINIC | Age: 38
End: 2023-09-06

## 2023-09-06 LAB — ACHR BLOCK AB SER-ACNC: 9 % — SIGNIFICANT CHANGE UP (ref 0–25)

## 2023-09-06 RX ORDER — OXYCODONE HYDROCHLORIDE 5 MG/1
5 TABLET ORAL ONCE
Refills: 0 | Status: DISCONTINUED | OUTPATIENT
Start: 2023-09-06 | End: 2023-09-08

## 2023-09-06 RX ORDER — IBUPROFEN 200 MG
600 TABLET ORAL EVERY 6 HOURS
Refills: 0 | Status: DISCONTINUED | OUTPATIENT
Start: 2023-09-06 | End: 2023-09-08

## 2023-09-06 RX ORDER — ACETAMINOPHEN 500 MG
975 TABLET ORAL EVERY 6 HOURS
Refills: 0 | Status: DISCONTINUED | OUTPATIENT
Start: 2023-09-06 | End: 2023-09-08

## 2023-09-06 RX ADMIN — Medication 600 MILLIGRAM(S): at 05:31

## 2023-09-06 RX ADMIN — Medication 975 MILLIGRAM(S): at 11:00

## 2023-09-06 RX ADMIN — Medication 1 DROP(S): at 05:02

## 2023-09-06 RX ADMIN — HEPARIN SODIUM 5000 UNIT(S): 5000 INJECTION INTRAVENOUS; SUBCUTANEOUS at 05:02

## 2023-09-06 RX ADMIN — Medication 15 MILLIGRAM(S): at 00:15

## 2023-09-06 RX ADMIN — Medication 15 MILLIGRAM(S): at 17:22

## 2023-09-06 RX ADMIN — Medication 15 MILLIGRAM(S): at 00:45

## 2023-09-06 RX ADMIN — Medication 975 MILLIGRAM(S): at 10:12

## 2023-09-06 RX ADMIN — HEPARIN SODIUM 5000 UNIT(S): 5000 INJECTION INTRAVENOUS; SUBCUTANEOUS at 17:23

## 2023-09-06 RX ADMIN — Medication 1 DROP(S): at 00:43

## 2023-09-06 RX ADMIN — Medication 600 MILLIGRAM(S): at 05:01

## 2023-09-06 RX ADMIN — PANTOPRAZOLE SODIUM 40 MILLIGRAM(S): 20 TABLET, DELAYED RELEASE ORAL at 05:55

## 2023-09-06 RX ADMIN — MAGNESIUM HYDROXIDE 30 MILLILITER(S): 400 TABLET, CHEWABLE ORAL at 05:02

## 2023-09-06 RX ADMIN — PANTOPRAZOLE SODIUM 40 MILLIGRAM(S): 20 TABLET, DELAYED RELEASE ORAL at 17:22

## 2023-09-06 RX ADMIN — Medication 1 DROP(S): at 20:57

## 2023-09-06 RX ADMIN — Medication 1 DROP(S): at 14:00

## 2023-09-06 NOTE — PROGRESS NOTE ADULT - ASSESSMENT
37 y/o  POD#3 from [facility] for rupture of membranes with breech presentation initially admitted for inability to tolerate PO and workup of dysphagia for 2 months. Pregnancy c/b IUGR (3%, AC 2%, UAD wnl). Workup thus far unremarkable. Patient continues to report same baseline complaints of throat discomfort, now on pureed diet and tube feeds. Patient currently in stable condition.    #Dysphagia  - Workup unremarkable, recommending tube feeds  - will re-consult GI today   - GI: no identified cause, signed off  - EGD w/ motility study (): normal RAC pattern, with additional KO feeding tube placement  - Barium esophagram (): able to swallow contrast and barium pill without difficulty, no stricture or mass effect noted  - Viral and auto immune labs without identified cause    - ENT: flexible endoscope unremarkable, no acute intervention, f/u outpatient  - Daily weights: admission weight 56.6kg, most recently 55.4kg   - Nutrition: 12hr tube feeds (-); change in EN to [employer] Standard 1.4 1300ml/day with 325ml boluses 4x/day (q6h). Consider PEG placement for long-term nutritional intervention if pt continues to be unable to tolerate PO intake   - TPN consult (): recommend tube feeds over TPN  - NGT placement confirmed by multiple CXR  - Neuro: MRI Head declined per patient, myasthenia labs still pending this AM    #Transaminitis  - Improving, resolved  - AST/ALT: 111/123 ->>52/62->>107/81->>57/50->86/67->101/82->>32/43  - Lipase: 94  - RUQ sono (): WNL    #Maternal Wellbeing/Routine Post-operative Care  - HSQ for DVT ppx  - C/w PO pain medications when able  - SW: recommend psychiatric evaluation  - Psychiatry: recommend anxiety medication (pt declines)  - C/w tube feeds per nutrition, full liquid diet  - Case management planning    ELIEZER Wyatt, PGY-3

## 2023-09-06 NOTE — CHART NOTE - NSCHARTNOTEFT_GEN_A_CORE
Reason for Follow-Up Assessment: Severe Malnutrition    23 OB Resident Note - 37 y/o  POD#2 from [facility] for rupture of membranes with breech presentation initially admitted for inability to tolerate PO and workup of dysphagia for 2 months. Pregnancy c/b IUGR (3%, AC 2%, UAD wnl). Workup thus far unremarkable. Patient continues to report same baseline complaints of throat discomfort, now on pureed diet and tube feeds. Patient currently in stable condition.    Source: [ ] Patient [ ] Family [ ] RN [ ] Chart     Diet, Full Liquid:   No Beef  No Shellfish  Tube Feeding Modality: Nasogastric  TwoCal HN (TWOCALHNRTH)  Total Volume for 24 Hours (mL): 960  Continuous  Starting Tube Feed Rate {mL per Hour}: 10  Increase Tube Feed Rate by (mL): 10     Every 4 hours  Until Goal Tube Feed Rate (mL per Hour): 80  Tube Feed Duration (in Hours): 12  Tube Feed Start Time: 07:00  Tube Feed Stop Time: 19:00 (23 @ 14:01)    Patient noted unable to take feeds due to reported allergy.  Soy allergy noted on chart.    Suggest change in [employer] Standard 1.4 1300ml/day  Suggest 325 ml bolus 4 times day (every 6 hours)  to provide 1820kcal, 80gm protein, 936ml free water via formula  Defer additional free water flush provision to physician  Defer appropriate PO diet provision to physician / team / SLP.    Patient continues to report swallowing issues despite w/u.    GI: WDL. Last BM noted on [  ]    PO intake:  [ ] Poor < 50%  [ ] Fair 50-75% [ ] Good  % [X] Inconsistent PO intake    Current weight 51.7kg as of 23 (patient s/p delivery); BMI 18.4    Edema: not currently noted in RN flowsheets    Pressure Injuries: None noted    _______________ Pertinent Medications_______________  MEDICATIONS  (STANDING):  acetaminophen   Oral Liquid .. 975 milliGRAM(s) Oral every 6 hours  artificial tears (preservative free) Ophthalmic Solution 1 Drop(s) Both EYES four times a day  diphtheria/tetanus/pertussis (acellular) Vaccine (Adacel) 0.5 milliLiter(s) IntraMuscular once  ferrous    sulfate 325 milliGRAM(s) Oral daily  folic acid 1 milliGRAM(s) Enteral Tube daily  heparin   Injectable 5000 Unit(s) SubCutaneous every 12 hours  ibuprofen  Suspension. 600 milliGRAM(s) Oral every 6 hours  ibuprofen  Tablet. 600 milliGRAM(s) Oral every 6 hours  ketorolac   Injectable 15 milliGRAM(s) IV Push every 6 hours  pantoprazole  Injectable 40 milliGRAM(s) IV Push every 12 hours  prenatal multivitamin 1 Tablet(s) Oral daily    MEDICATIONS  (PRN):  diphenhydrAMINE 25 milliGRAM(s) Oral every 6 hours PRN Pruritus  lanolin Ointment 1 Application(s) Topical every 6 hours PRN Sore Nipples  magnesium hydroxide Suspension 30 milliLiter(s) Oral two times a day PRN Constipation  oxyCODONE    IR 5 milliGRAM(s) Oral once PRN Moderate to Severe Pain (4-10)  oxyCODONE    IR 5 milliGRAM(s) Oral every 3 hours PRN Moderate to Severe Pain (4-10)  simethicone 80 milliGRAM(s) Chew every 4 hours PRN Gas      __________________ Pertinent Labs__________________    Na133 mmol/L<L> Glu 76 mg/dL K+ 4.0 mmol/L Cr  0.55 mg/dL BUN 6 mg/dL<L>  Phos 3.8 mg/dL  Alb 3.0 g/dL<L>    Estimated Needs:   [ ] no change since previous assessment  [ X ] recalculated:   Kcal 30-35kcal/fc=9269-0078mquk  Pro 1.2-1.5gm/kg=62-78gm    Previous Nutrition Diagnosis: Severe Malnutriton    Nutrition Diagnosis is [ X ] ongoing  [ ] resolved [ ] not applicable     New Nutrition Diagnosis: n/a     Monitoring and Evaluation:     [ x ] Tolerance to diet prescription / adequacy of meal intake  [ x ] Weight trends   [ x ] Pertinent labs    Recommendations:  1) Suggest change in EN [employer] Standard 1.4 1300ml/day  Suggest 325 ml bolus 4 times day (every 6 hours)  to provide 1820kcal, 80gm protein, 936ml free water via formula  Defer additional free water flush provision to physician  Defer appropriate PO diet provision to physician / team / SLP.    2) Please consistently document % meal intake in nursing flowsheets.     Philomena Valle MS, RDN, CDN  Pager [phone]  Also available on MS Teams Reason for Follow-Up Assessment: Severe Malnutrition    23 OB Resident Note - 39 y/o  POD#2 from [facility] for rupture of membranes with breech presentation initially admitted for inability to tolerate PO and workup of dysphagia for 2 months. Pregnancy c/b IUGR (3%, AC 2%, UAD wnl). Workup thus far unremarkable. Patient continues to report same baseline complaints of throat discomfort, now on pureed diet and tube feeds. Patient currently in stable condition.    Source: [ ] Patient [ ] Family [ ] RN [ ] Chart     Diet, Full Liquid:   No Beef  No Shellfish  Tube Feeding Modality: Nasogastric  TwoCal HN (TWOCALHNRTH)  Total Volume for 24 Hours (mL): 960  Continuous  Starting Tube Feed Rate {mL per Hour}: 10  Increase Tube Feed Rate by (mL): 10     Every 4 hours  Until Goal Tube Feed Rate (mL per Hour): 80  Tube Feed Duration (in Hours): 12  Tube Feed Start Time: 07:00  Tube Feed Stop Time: 19:00 (23 @ 14:01)    Patient noted unable to take feeds due to reported allergy.  Soy allergy noted on chart.    Suggest change in [employer] Standard 1.4 1300ml/day  Suggest 325 ml bolus 4 times day (every 6 hours)  to provide 1820kcal, 80gm protein, 936ml free water via formula  Defer additional free water flush provision to physician  Defer appropriate PO diet provision to physician / team / SLP.    Patient continues to report swallowing issues despite w/u. RD spoke with PA - orders to be placed in pending verification for signoff.    GI: WDL. Last BM noted on [  ]    PO intake:  [ ] Poor < 50%  [ ] Fair 50-75% [ ] Good  % [X] Inconsistent PO intake    Current weight 51.7kg as of 23 (patient s/p delivery); BMI 18.4    Edema: not currently noted in RN flowsheets    Pressure Injuries: None noted    _______________ Pertinent Medications_______________  MEDICATIONS  (STANDING):  acetaminophen   Oral Liquid .. 975 milliGRAM(s) Oral every 6 hours  artificial tears (preservative free) Ophthalmic Solution 1 Drop(s) Both EYES four times a day  diphtheria/tetanus/pertussis (acellular) Vaccine (Adacel) 0.5 milliLiter(s) IntraMuscular once  ferrous    sulfate 325 milliGRAM(s) Oral daily  folic acid 1 milliGRAM(s) Enteral Tube daily  heparin   Injectable 5000 Unit(s) SubCutaneous every 12 hours  ibuprofen  Suspension. 600 milliGRAM(s) Oral every 6 hours  ibuprofen  Tablet. 600 milliGRAM(s) Oral every 6 hours  ketorolac   Injectable 15 milliGRAM(s) IV Push every 6 hours  pantoprazole  Injectable 40 milliGRAM(s) IV Push every 12 hours  prenatal multivitamin 1 Tablet(s) Oral daily    MEDICATIONS  (PRN):  diphenhydrAMINE 25 milliGRAM(s) Oral every 6 hours PRN Pruritus  lanolin Ointment 1 Application(s) Topical every 6 hours PRN Sore Nipples  magnesium hydroxide Suspension 30 milliLiter(s) Oral two times a day PRN Constipation  oxyCODONE    IR 5 milliGRAM(s) Oral once PRN Moderate to Severe Pain (4-10)  oxyCODONE    IR 5 milliGRAM(s) Oral every 3 hours PRN Moderate to Severe Pain (4-10)  simethicone 80 milliGRAM(s) Chew every 4 hours PRN Gas      __________________ Pertinent Labs__________________    Na133 mmol/L<L> Glu 76 mg/dL K+ 4.0 mmol/L Cr  0.55 mg/dL BUN 6 mg/dL<L>  Phos 3.8 mg/dL  Alb 3.0 g/dL<L>    Estimated Needs:   [ ] no change since previous assessment  [ X ] recalculated:   Kcal 30-35kcal/qb=9085-4371fakm  Pro 1.2-1.5gm/kg=62-78gm    Previous Nutrition Diagnosis: Severe Malnutriton    Nutrition Diagnosis is [ X ] ongoing  [ ] resolved [ ] not applicable     New Nutrition Diagnosis: n/a     Monitoring and Evaluation:     [ x ] Tolerance to diet prescription / adequacy of meal intake  [ x ] Weight trends   [ x ] Pertinent labs    Recommendations:  1) Suggest change in EN [employer] Standard 1.4 1300ml/day  Suggest 325 ml bolus 4 times day (every 6 hours)  to provide 1820kcal, 80gm protein, 936ml free water via formula; please document tolerance to EN.  Defer additional free water flush provision to physician  Defer appropriate PO diet provision to physician / team / SLP.   IF unable to resume PO intake due to persistent reported dysphagia, may need to consider PEG placement for more long term nutritional intervention to ensure adequacy of nutritional intake.     2) Please consistently document % meal intake in nursing flowsheets.   3) Reconsult RDN at any time for further recommendations.    Philomena Valle, MS, RDN, CDN  Pager [phone]  Also available on MS Teams Reason for Follow-Up Assessment: Severe Malnutrition    23 OB Resident Note - 37 y/o  POD#2 from [facility] for rupture of membranes with breech presentation initially admitted for inability to tolerate PO and workup of dysphagia for 2 months. Pregnancy c/b IUGR (3%, AC 2%, UAD wnl). Workup thus far unremarkable. Patient continues to report same baseline complaints of throat discomfort, now on pureed diet and tube feeds. Patient currently in stable condition.    Source: [ ] Patient [ ] Family [ ] RN [X] Chart, PA    Diet, Full Liquid:   No Beef  No Shellfish  Tube Feeding Modality: Nasogastric  TwoCal HN (TWOCALHNRTH)  Total Volume for 24 Hours (mL): 960  Continuous  Starting Tube Feed Rate {mL per Hour}: 10  Increase Tube Feed Rate by (mL): 10     Every 4 hours  Until Goal Tube Feed Rate (mL per Hour): 80  Tube Feed Duration (in Hours): 12  Tube Feed Start Time: 07:00  Tube Feed Stop Time: 19:00 (23 @ 14:01)    Patient noted unable to take feeds due to reported allergy.  Soy allergy noted on chart.    Suggest change in [employer] Standard 1.4 1300ml/day  Suggest 325 ml bolus 4 times day (every 6 hours)  to provide 1820kcal, 80gm protein, 936ml free water via formula  Defer additional free water flush provision to physician  Defer appropriate PO diet provision to physician / team / SLP.    Patient continues to report swallowing issues despite w/u. RD spoke with PA - orders to be placed in pending verification for signoff.    GI: WDL. Last BM noted on [  ]    PO intake:  [ ] Poor < 50%  [ ] Fair 50-75% [ ] Good  % [X] Inconsistent PO intake    Current weight 51.7kg as of 23 (patient s/p delivery); BMI 18.4    Edema: not currently noted in RN flowsheets    Pressure Injuries: None noted    _______________ Pertinent Medications_______________  MEDICATIONS  (STANDING):  acetaminophen   Oral Liquid .. 975 milliGRAM(s) Oral every 6 hours  artificial tears (preservative free) Ophthalmic Solution 1 Drop(s) Both EYES four times a day  diphtheria/tetanus/pertussis (acellular) Vaccine (Adacel) 0.5 milliLiter(s) IntraMuscular once  ferrous    sulfate 325 milliGRAM(s) Oral daily  folic acid 1 milliGRAM(s) Enteral Tube daily  heparin   Injectable 5000 Unit(s) SubCutaneous every 12 hours  ibuprofen  Suspension. 600 milliGRAM(s) Oral every 6 hours  ibuprofen  Tablet. 600 milliGRAM(s) Oral every 6 hours  ketorolac   Injectable 15 milliGRAM(s) IV Push every 6 hours  pantoprazole  Injectable 40 milliGRAM(s) IV Push every 12 hours  prenatal multivitamin 1 Tablet(s) Oral daily    MEDICATIONS  (PRN):  diphenhydrAMINE 25 milliGRAM(s) Oral every 6 hours PRN Pruritus  lanolin Ointment 1 Application(s) Topical every 6 hours PRN Sore Nipples  magnesium hydroxide Suspension 30 milliLiter(s) Oral two times a day PRN Constipation  oxyCODONE    IR 5 milliGRAM(s) Oral once PRN Moderate to Severe Pain (4-10)  oxyCODONE    IR 5 milliGRAM(s) Oral every 3 hours PRN Moderate to Severe Pain (4-10)  simethicone 80 milliGRAM(s) Chew every 4 hours PRN Gas      __________________ Pertinent Labs__________________    Na133 mmol/L<L> Glu 76 mg/dL K+ 4.0 mmol/L Cr  0.55 mg/dL BUN 6 mg/dL<L>  Phos 3.8 mg/dL  Alb 3.0 g/dL<L>    Estimated Needs:   [ ] no change since previous assessment  [ X ] recalculated:   Kcal 30-35kcal/yu=2804-5165rqmk  Pro 1.2-1.5gm/kg=62-78gm    Previous Nutrition Diagnosis: Severe Malnutriton    Nutrition Diagnosis is [ X ] ongoing  [ ] resolved [ ] not applicable     New Nutrition Diagnosis: n/a     Monitoring and Evaluation:     [ x ] Tolerance to diet prescription / adequacy of meal intake  [ x ] Weight trends   [ x ] Pertinent labs    Recommendations:  1) Suggest change in EN [employer] Standard 1.4 1300ml/day  Suggest 325 ml bolus 4 times day (every 6 hours)  to provide 1820kcal, 80gm protein, 936ml free water via formula; please document tolerance to EN.  Defer additional free water flush provision to physician  Defer appropriate PO diet provision to physician / team / SLP.   IF unable to resume PO intake due to persistent reported dysphagia, may need to consider PEG placement for more long term nutritional intervention to ensure adequacy of nutritional intake.     2) Please consistently document % meal intake in nursing flowsheets.   3) Reconsult RDN at any time for further recommendations.    Philomena Valle, MS, RDN, CDN  Pager [phone]  Also available on MS Teams Reason for Follow-Up Assessment: Severe Malnutrition    23 OB Resident Note - 37 y/o  POD#2 from [facility] for rupture of membranes with breech presentation initially admitted for inability to tolerate PO and workup of dysphagia for 2 months. Pregnancy c/b IUGR (3%, AC 2%, UAD wnl). Workup thus far unremarkable. Patient continues to report same baseline complaints of throat discomfort, now on pureed diet and tube feeds. Patient currently in stable condition. RD spoke to PA and RN this AM regarding recommended change in EN to [employer].     Per RN, patient has been intermittently accepting tube feeds, but not at rate beyond 20ml/hr.  As per patient has been receiving [employer] vs TwoCalHN over the past week.  Patient reported to be tolerating some PO intake over the past day or so; soups being provided from family, had Caramel Frappacino yesterday, as well as bread, and some English.  Significant other at bedside and confirmed same. RD reinforced importance of nutrition to promote weight status and breastfeeding efforts.  Patient denies swallowing difficulty of recent, she stated she is getting food down but stated she continues to feel tightness after getting food down.     Source: [ ] Patient [ ] Family [ ] RN [X] Chart, PA    Diet, Full Liquid:   No Beef  No Shellfish  Tube Feeding Modality: Nasogastric  TwoCal HN (TWOCALHNRTH)  Total Volume for 24 Hours (mL): 960  Continuous  Starting Tube Feed Rate {mL per Hour}: 10  Increase Tube Feed Rate by (mL): 10     Every 4 hours  Until Goal Tube Feed Rate (mL per Hour): 80  Tube Feed Duration (in Hours): 12  Tube Feed Start Time: 07:00  Tube Feed Stop Time: 19:00 (23 @ 14:01)      Patient noted unable to take feeds due to reported allergy.  Soy allergy now noted on chart.  Patient reported she has h/o skin itchiness from soy products.    Suggest change in EN [employer] Standard 1.4 1300ml/day  Suggest 325 ml bolus 4 times day (every 6 hours)  to provide 1820kcal, 80gm protein, 936ml free water via formula  Defer additional free water flush provision to physician  Defer appropriate PO diet provision to physician / team / SLP.    RD spoke with PA - orders for [employer] have been placed in pending verification for signoff. Please d/c TwoCalHN orders.    PO intake:  [ ] Poor < 50%  [ ] Fair 50-75% [ ] Good  % [X] Inconsistent PO intake    Current weight 51.7kg as of 23 (patient s/p delivery); BMI 18.4    Edema: not currently noted in RN flowsheets    Pressure Injuries: None noted    _______________ Pertinent Medications_______________  MEDICATIONS  (STANDING):  acetaminophen   Oral Liquid .. 975 milliGRAM(s) Oral every 6 hours  artificial tears (preservative free) Ophthalmic Solution 1 Drop(s) Both EYES four times a day  diphtheria/tetanus/pertussis (acellular) Vaccine (Adacel) 0.5 milliLiter(s) IntraMuscular once  ferrous    sulfate 325 milliGRAM(s) Oral daily  folic acid 1 milliGRAM(s) Enteral Tube daily  heparin   Injectable 5000 Unit(s) SubCutaneous every 12 hours  ibuprofen  Suspension. 600 milliGRAM(s) Oral every 6 hours  ibuprofen  Tablet. 600 milliGRAM(s) Oral every 6 hours  ketorolac   Injectable 15 milliGRAM(s) IV Push every 6 hours  pantoprazole  Injectable 40 milliGRAM(s) IV Push every 12 hours  prenatal multivitamin 1 Tablet(s) Oral daily    MEDICATIONS  (PRN):  diphenhydrAMINE 25 milliGRAM(s) Oral every 6 hours PRN Pruritus  lanolin Ointment 1 Application(s) Topical every 6 hours PRN Sore Nipples  magnesium hydroxide Suspension 30 milliLiter(s) Oral two times a day PRN Constipation  oxyCODONE    IR 5 milliGRAM(s) Oral once PRN Moderate to Severe Pain (4-10)  oxyCODONE    IR 5 milliGRAM(s) Oral every 3 hours PRN Moderate to Severe Pain (4-10)  simethicone 80 milliGRAM(s) Chew every 4 hours PRN Gas      __________________ Pertinent Labs__________________    Na133 mmol/L<L> Glu 76 mg/dL K+ 4.0 mmol/L Cr  0.55 mg/dL BUN 6 mg/dL<L>  Phos 3.8 mg/dL  Alb 3.0 g/dL<L>    Estimated Needs:   [ ] no change since previous assessment  [ X ] recalculated:   Kcal 30-35kcal/ad=6831-9058lkmj  Pro 1.2-1.5gm/kg=62-78gm    Previous Nutrition Diagnosis: Severe Malnutrition, Increased Nutrient Needs (patient now breasfeeding)    Nutrition Diagnosis is [ X ] ongoing  [ ] resolved [ ] not applicable     New Nutrition Diagnosis: n/a     Monitoring and Evaluation:     [ x ] Tolerance to diet prescription / adequacy of meal intake  [ x ] Weight trends   [ x ] Pertinent labs    Recommendations:  1) Suggested change in EN to [employer] Standard 1.4 1300ml/day  Suggest 325 ml bolus 4 times day (every 6 hours)  to provide 1820kcal, 80gm protein, 936ml free water via formula; please document tolerance to EN.  Patient aware of plan to transition to bolus feeds, no objection at time of visit.  Defer additional free water flush provision to physician  Defer appropriate PO diet provision to physician / team / SLP.   IF unable to resume PO intake due to persistent reported dysphagia and/or consistent poor PO intake/wt loss, may need to consider PEG placement for more long-term nutritional intervention to ensure adequacy of nutritional intake. Patient stated physician spoke to her regarding this and she does not want a feeding tube as stated she plans to increase PO gradually. ? Consider behavioral health follow-up and/or SLP followup if indicated.     2) Please consistently document % meal intake in nursing flowsheets.   3) Reconsult RDN at any time for further recommendations/interventions.    Philomena Valle, MS, RDN, CDN  Pager [phone]  Also available on MS Teams Reason for Follow-Up Assessment: Severe Malnutrition    23 OB Resident Note - 39 y/o  POD#2 from [facility] for rupture of membranes with breech presentation initially admitted for inability to tolerate PO and workup of dysphagia for 2 months. Pregnancy c/b IUGR (3%, AC 2%, UAD wnl). Workup thus far unremarkable. Patient continues to report same baseline complaints of throat discomfort, now on pureed diet and tube feeds. Patient currently in stable condition. RD spoke to PA and RN this AM regarding recommended change in EN to [employer].     Per RN, patient has been intermittently accepting tube feeds, but not at rate beyond 20ml/hr.  As per patient has been receiving [employer] vs TwoCalHN over the past week.  Patient reported to be tolerating some PO intake over the past day or so; soups being provided from family, had Caramel Frappacino yesterday, as well as bread, and some Hebrew.  Significant other at bedside and confirmed same. RD reinforced importance of nutrition to promote weight status and breastfeeding efforts.  Patient denies swallowing difficulty of recent, she stated she is getting food down but stated she continues to feel tightness after getting food down.  Patient without reports of nausea, vomiting, bowel issues i.e. diarrhea/constipation.    Source: [ X ] Patient [ X ] Family [ ] RN [X] Chart, PA    Diet, Full Liquid:   No Beef  No Shellfish  Tube Feeding Modality: Nasogastric  TwoCal HN (TWOCALHNRTH)  Total Volume for 24 Hours (mL): 960  Continuous  Starting Tube Feed Rate {mL per Hour}: 10  Increase Tube Feed Rate by (mL): 10     Every 4 hours  Until Goal Tube Feed Rate (mL per Hour): 80  Tube Feed Duration (in Hours): 12  Tube Feed Start Time: 07:00  Tube Feed Stop Time: 19:00 (23 @ 14:01)      Patient noted unable to take feeds due to reported allergy.  Soy allergy now noted on chart.  Patient reported she has h/o skin itchiness from soy products.    Suggest change in EN [employer] Standard 1.4 1300ml/day  Suggest 325 ml bolus 4 times day (every 6 hours)  to provide 1820kcal, 80gm protein, 936ml free water via formula  Defer additional free water flush provision to physician  Defer appropriate PO diet provision to physician / team / SLP.    RD spoke with PA - orders for [employer] have been placed in pending verification for signoff. Please d/c TwoCalHN orders.    PO intake:  [ ] Poor < 50%  [ ] Fair 50-75% [ ] Good  % [X] Inconsistent PO intake    Current weight 51.7kg as of 23 (patient s/p delivery); BMI 18.4    Edema: not currently noted in RN flowsheets    Pressure Injuries: None noted    _______________ Pertinent Medications_______________  MEDICATIONS  (STANDING):  acetaminophen   Oral Liquid .. 975 milliGRAM(s) Oral every 6 hours  artificial tears (preservative free) Ophthalmic Solution 1 Drop(s) Both EYES four times a day  diphtheria/tetanus/pertussis (acellular) Vaccine (Adacel) 0.5 milliLiter(s) IntraMuscular once  ferrous    sulfate 325 milliGRAM(s) Oral daily  folic acid 1 milliGRAM(s) Enteral Tube daily  heparin   Injectable 5000 Unit(s) SubCutaneous every 12 hours  ibuprofen  Suspension. 600 milliGRAM(s) Oral every 6 hours  ibuprofen  Tablet. 600 milliGRAM(s) Oral every 6 hours  ketorolac   Injectable 15 milliGRAM(s) IV Push every 6 hours  pantoprazole  Injectable 40 milliGRAM(s) IV Push every 12 hours  prenatal multivitamin 1 Tablet(s) Oral daily    MEDICATIONS  (PRN):  diphenhydrAMINE 25 milliGRAM(s) Oral every 6 hours PRN Pruritus  lanolin Ointment 1 Application(s) Topical every 6 hours PRN Sore Nipples  magnesium hydroxide Suspension 30 milliLiter(s) Oral two times a day PRN Constipation  oxyCODONE    IR 5 milliGRAM(s) Oral once PRN Moderate to Severe Pain (4-10)  oxyCODONE    IR 5 milliGRAM(s) Oral every 3 hours PRN Moderate to Severe Pain (4-10)  simethicone 80 milliGRAM(s) Chew every 4 hours PRN Gas      __________________ Pertinent Labs__________________   09-04 Na133 mmol/L<L> Glu 76 mg/dL K+ 4.0 mmol/L Cr  0.55 mg/dL BUN 6 mg/dL<L>  Phos 3.8 mg/dL  Alb 3.0 g/dL<L>    Estimated Needs:   [ ] no change since previous assessment  [ X ] recalculated:   Kcal 30-35kcal/vn=2960-2675qonn  Pro 1.2-1.5gm/kg=62-78gm    Previous Nutrition Diagnosis: Severe Malnutrition, Increased Nutrient Needs (patient now breasfeeding)    Nutrition Diagnosis is [ X ] ongoing  [ ] resolved [ ] not applicable     New Nutrition Diagnosis: n/a     Monitoring and Evaluation:     [ x ] Tolerance to diet prescription / adequacy of meal intake  [ x ] Weight trends   [ x ] Pertinent labs    Recommendations:  1) Suggested change in EN to [employer] Standard 1.4 1300ml/day  Suggest 325 ml bolus 4 times day (every 6 hours)  to provide 1820kcal, 80gm protein, 936ml free water via formula; please document tolerance to EN.  Patient aware of plan to transition to bolus feeds, no objection at time of visit.  Defer additional free water flush provision to physician  Defer appropriate PO diet provision to physician / team / SLP.   IF unable to resume PO intake due to persistent reported dysphagia and/or consistent poor PO intake/wt loss, may need to consider PEG placement for more long-term nutritional intervention to ensure adequacy of nutritional intake. Patient stated physician spoke to her regarding this and she does not want a feeding tube as stated she plans to increase PO gradually. ? Consider behavioral health follow-up and/or SLP followup if indicated.     2) Please consistently document % meal intake in nursing flowsheets.   3) Reconsult RDN at any time for further recommendations/interventions.    Philomena Valle, MS, RDN, CDN  Pager [phone]  Also available on MS Teams

## 2023-09-06 NOTE — PROGRESS NOTE ADULT - SUBJECTIVE AND OBJECTIVE BOX
R3 Antepartum Note    Patient seen and examined at bedside, no acute overnight events. No acute complaints. States that she had two cups of soup yesterday and was able to keep it down but is feeling increased heaviness of the tongue and tightness of throat this morning. Did not take tube feeds yesterday but believes she may need them again today due to inability to swallow given above. Abdominal pain well controlled with analgesia. Denies RUQ or epigastric pain, HA/dizziness, CP/SOB, fevers/chills.    Vital Signs Last 24 Hours  T(C): 36.6 (09-06-23 @ 06:10), Max: 36.9 (09-05-23 @ 13:56)  HR: 84 (09-06-23 @ 06:10) (72 - 90)  BP: 101/69 (09-06-23 @ 06:10) (100/68 - 103/67)  RR: 17 (09-06-23 @ 06:10) (16 - 17)  SpO2: 99% (09-06-23 @ 06:10) (96% - 100%)    CAPILLARY BLOOD GLUCOSE          Physical Exam:  General: NAD. NGT in place   Abdomen: Soft, appropriately tender  Incision: c/d/i   Pelvic: Lochia wnl   Ext: No pain or swelling    Labs:                MEDICATIONS  (STANDING):  acetaminophen   Oral Liquid .. 975 milliGRAM(s) Oral every 6 hours  artificial tears (preservative free) Ophthalmic Solution 1 Drop(s) Both EYES four times a day  diphtheria/tetanus/pertussis (acellular) Vaccine (Adacel) 0.5 milliLiter(s) IntraMuscular once  ferrous    sulfate 325 milliGRAM(s) Oral daily  folic acid 1 milliGRAM(s) Enteral Tube daily  heparin   Injectable 5000 Unit(s) SubCutaneous every 12 hours  ibuprofen  Suspension. 600 milliGRAM(s) Oral every 6 hours  ibuprofen  Tablet. 600 milliGRAM(s) Oral every 6 hours  ketorolac   Injectable 15 milliGRAM(s) IV Push every 6 hours  pantoprazole  Injectable 40 milliGRAM(s) IV Push every 12 hours  prenatal multivitamin 1 Tablet(s) Oral daily    MEDICATIONS  (PRN):  diphenhydrAMINE 25 milliGRAM(s) Oral every 6 hours PRN Pruritus  lanolin Ointment 1 Application(s) Topical every 6 hours PRN Sore Nipples  magnesium hydroxide Suspension 30 milliLiter(s) Oral two times a day PRN Constipation  oxyCODONE    IR 5 milliGRAM(s) Oral every 3 hours PRN Moderate to Severe Pain (4-10)  oxyCODONE    IR 5 milliGRAM(s) Oral once PRN Moderate to Severe Pain (4-10)  simethicone 80 milliGRAM(s) Chew every 4 hours PRN Gas

## 2023-09-06 NOTE — CHART NOTE - NSCHARTNOTEFT_GEN_A_CORE
PA Note    Notified by DARREL Sousa that patient feels like her NG tube is dislodged. Spoke with Dr. Chow. Patient has been refusing enteral feeding all morning and has been eating chicken and lentil soup with it in place. Decision made to remove NG tube. Patient tolerated removal. Of note, patient had a bowel movement earlier today.    ICU Vital Signs Last 24 Hrs  T(C): 37.2 (06 Sep 2023 16:58), Max: 37.2 (06 Sep 2023 16:58)  T(F): 98.9 (06 Sep 2023 16:58), Max: 98.9 (06 Sep 2023 16:58)  HR: 81 (06 Sep 2023 16:58) (72 - 100)  BP: 116/78 (06 Sep 2023 16:58) (100/68 - 116/78)  BP(mean): --  ABP: --  ABP(mean): --  RR: 18 (06 Sep 2023 16:58) (16 - 18)  SpO2: 100% (06 Sep 2023 16:58) (96% - 100%)    O2 Parameters below as of 06 Sep 2023 14:26  Patient On (Oxygen Delivery Method): room air    Physical Exam:  General: NAD, well appearing  Abdomen: soft, appropriate tenderness, no rebound, no guarding    Assessment:  39yo P1 s/p c/s for PPROM in breech presentation. Vitals stable. Labs stable.    Plan:  -Regular Halal diet  -Monitor vitals  -Routine postpartum care  -Increase OOB  -AM labs    D/w Dr. MINNIE Chow (MD)  D/w Dr. ELIEZER Wyatt (PGY3)    Laurel Monae PA-C

## 2023-09-06 NOTE — PROGRESS NOTE ADULT - ATTENDING COMMENTS
Pt seen and evaluated at bedside  Agree with above   POD 2 s/p pLTCS for PPROM breech presentation  Appreciate nutrition input, will transition to bolus tube feed  Discussed importance of attempting to increase PO intake, if pt is not able to take in adequate calories, may need to consider PEG tub, as prolonged NG tube can have negative effects. Pt agrees to attempt to increase PO intake  Pt also would like to reattempt MRI now that she is postpartum    peggy TANNER

## 2023-09-07 LAB
ALBUMIN SERPL ELPH-MCNC: 3 G/DL — LOW (ref 3.3–5)
ALP SERPL-CCNC: 122 U/L — HIGH (ref 40–120)
ALT FLD-CCNC: 25 U/L — SIGNIFICANT CHANGE UP (ref 4–33)
ANION GAP SERPL CALC-SCNC: 11 MMOL/L — SIGNIFICANT CHANGE UP (ref 7–14)
AST SERPL-CCNC: 25 U/L — SIGNIFICANT CHANGE UP (ref 4–32)
BASOPHILS # BLD AUTO: 0.05 K/UL — SIGNIFICANT CHANGE UP (ref 0–0.2)
BASOPHILS NFR BLD AUTO: 0.8 % — SIGNIFICANT CHANGE UP (ref 0–2)
BILIRUB SERPL-MCNC: 0.4 MG/DL — SIGNIFICANT CHANGE UP (ref 0.2–1.2)
BUN SERPL-MCNC: 8 MG/DL — SIGNIFICANT CHANGE UP (ref 7–23)
CALCIUM SERPL-MCNC: 9.3 MG/DL — SIGNIFICANT CHANGE UP (ref 8.4–10.5)
CHLORIDE SERPL-SCNC: 104 MMOL/L — SIGNIFICANT CHANGE UP (ref 98–107)
CO2 SERPL-SCNC: 24 MMOL/L — SIGNIFICANT CHANGE UP (ref 22–31)
CREAT SERPL-MCNC: 0.58 MG/DL — SIGNIFICANT CHANGE UP (ref 0.5–1.3)
EGFR: 120 ML/MIN/1.73M2 — SIGNIFICANT CHANGE UP
EOSINOPHIL # BLD AUTO: 0.34 K/UL — SIGNIFICANT CHANGE UP (ref 0–0.5)
EOSINOPHIL NFR BLD AUTO: 5.5 % — SIGNIFICANT CHANGE UP (ref 0–6)
GLUCOSE SERPL-MCNC: 85 MG/DL — SIGNIFICANT CHANGE UP (ref 70–99)
HCT VFR BLD CALC: 26.6 % — LOW (ref 34.5–45)
HGB BLD-MCNC: 8.6 G/DL — LOW (ref 11.5–15.5)
IANC: 3.31 K/UL — SIGNIFICANT CHANGE UP (ref 1.8–7.4)
IMM GRANULOCYTES NFR BLD AUTO: 0.3 % — SIGNIFICANT CHANGE UP (ref 0–0.9)
LYMPHOCYTES # BLD AUTO: 2.02 K/UL — SIGNIFICANT CHANGE UP (ref 1–3.3)
LYMPHOCYTES # BLD AUTO: 32.7 % — SIGNIFICANT CHANGE UP (ref 13–44)
MCHC RBC-ENTMCNC: 26.7 PG — LOW (ref 27–34)
MCHC RBC-ENTMCNC: 32.3 GM/DL — SIGNIFICANT CHANGE UP (ref 32–36)
MCV RBC AUTO: 82.6 FL — SIGNIFICANT CHANGE UP (ref 80–100)
MONOCYTES # BLD AUTO: 0.44 K/UL — SIGNIFICANT CHANGE UP (ref 0–0.9)
MONOCYTES NFR BLD AUTO: 7.1 % — SIGNIFICANT CHANGE UP (ref 2–14)
NEUTROPHILS # BLD AUTO: 3.31 K/UL — SIGNIFICANT CHANGE UP (ref 1.8–7.4)
NEUTROPHILS NFR BLD AUTO: 53.6 % — SIGNIFICANT CHANGE UP (ref 43–77)
NRBC # BLD: 0 /100 WBCS — SIGNIFICANT CHANGE UP (ref 0–0)
NRBC # FLD: 0 K/UL — SIGNIFICANT CHANGE UP (ref 0–0)
PLATELET # BLD AUTO: 249 K/UL — SIGNIFICANT CHANGE UP (ref 150–400)
POTASSIUM SERPL-MCNC: 3.8 MMOL/L — SIGNIFICANT CHANGE UP (ref 3.5–5.3)
POTASSIUM SERPL-SCNC: 3.8 MMOL/L — SIGNIFICANT CHANGE UP (ref 3.5–5.3)
PROT SERPL-MCNC: 6.4 G/DL — SIGNIFICANT CHANGE UP (ref 6–8.3)
RBC # BLD: 3.22 M/UL — LOW (ref 3.8–5.2)
RBC # FLD: 15.9 % — HIGH (ref 10.3–14.5)
SODIUM SERPL-SCNC: 139 MMOL/L — SIGNIFICANT CHANGE UP (ref 135–145)
WBC # BLD: 6.18 K/UL — SIGNIFICANT CHANGE UP (ref 3.8–10.5)
WBC # FLD AUTO: 6.18 K/UL — SIGNIFICANT CHANGE UP (ref 3.8–10.5)

## 2023-09-07 PROCEDURE — 74018 RADEX ABDOMEN 1 VIEW: CPT | Mod: 26

## 2023-09-07 RX ADMIN — Medication 15 MILLIGRAM(S): at 12:02

## 2023-09-07 RX ADMIN — Medication 1 DROP(S): at 02:47

## 2023-09-07 RX ADMIN — Medication 1 DROP(S): at 10:17

## 2023-09-07 RX ADMIN — Medication 15 MILLIGRAM(S): at 19:05

## 2023-09-07 RX ADMIN — Medication 15 MILLIGRAM(S): at 02:37

## 2023-09-07 RX ADMIN — Medication 15 MILLIGRAM(S): at 10:13

## 2023-09-07 RX ADMIN — PANTOPRAZOLE SODIUM 40 MILLIGRAM(S): 20 TABLET, DELAYED RELEASE ORAL at 05:47

## 2023-09-07 RX ADMIN — HEPARIN SODIUM 5000 UNIT(S): 5000 INJECTION INTRAVENOUS; SUBCUTANEOUS at 19:05

## 2023-09-07 RX ADMIN — PANTOPRAZOLE SODIUM 40 MILLIGRAM(S): 20 TABLET, DELAYED RELEASE ORAL at 19:06

## 2023-09-07 RX ADMIN — HEPARIN SODIUM 5000 UNIT(S): 5000 INJECTION INTRAVENOUS; SUBCUTANEOUS at 05:48

## 2023-09-07 RX ADMIN — Medication 15 MILLIGRAM(S): at 03:07

## 2023-09-07 NOTE — PROGRESS NOTE ADULT - ASSESSMENT
37 y/o  POD#4 from \[facility]\"" for rupture of membranes with breech presentation initially admitted for inability to tolerate PO and workup of dysphagia for 2 months. Pregnancy c/b IUGR (3%, AC 2%, UAD wnl). Workup thus far unremarkable. Patient now s/p NGT, tolerating small amounts of regular diet.     #Dysphagia  - Workup unremarkable, s/p NGT with tube feeds   - Tolerating regular diet  - GI: no identified cause, signed off  - EGD w/ motility study (): normal RAC pattern, with additional KO feeding tube placement  - Barium esophagram (): able to swallow contrast and barium pill without difficulty, no stricture or mass effect noted  - Viral and auto immune labs without identified cause    - ENT: flexible endoscope unremarkable, no acute intervention, f/u outpatient  - Daily weights: admission weight 56.6kg, most recently 55.4kg   - Nutrition: 12hr tube feeds (-); change in EN to [employer] Standard 1.4 1300ml/day with 325ml boluses 4x/day (q6h). Consider PEG placement for long-term nutritional intervention if pt continues to be unable to tolerate PO intake   - TPN consult (): recommend tube feeds over TPN  - NGT placement confirmed by multiple CXR  - Neuro: MRI Head declined per patient, myasthenia labs still pending this AM    #Transaminitis  - Improving, resolved  - AST/ALT: 111/123 ->>52/62->>107/81->>57/50->86/67->101/82->>32/43  - Lipase: 94  - RUQ sono (): WNL    #Maternal Wellbeing/Routine Post-operative Care  - HSQ for DVT ppx  - C/w PO pain medications when able  - SW: recommend psychiatric evaluation  - Psychiatry: recommend anxiety medication (pt declines)  - C/w tube feeds per nutrition, full liquid diet  - Case management planning    ELIEZER Wyatt, PGY-3 37 y/o  POD#4 from [facility] for rupture of membranes with breech presentation initially admitted for inability to tolerate PO and workup of dysphagia for 2 months. Pregnancy c/b IUGR (3%, AC 2%, UAD wnl). Workup thus far unremarkable. Patient now s/p NGT, tolerating small amounts of regular diet.     #Dysphagia  - Workup unremarkable, s/p NGT with tube feeds   - Tolerating regular diet  - GI: no identified cause, signed off  - EGD w/ motility study (): normal RAC pattern, with additional KO feeding tube placement  - Barium esophagram (): able to swallow contrast and barium pill without difficulty, no stricture or mass effect noted  - Viral and auto immune labs without identified cause    - ENT: flexible endoscope unremarkable, no acute intervention, f/u outpatient  - Daily weights: admission weight 56.6kg, most recently 55.4kg   - Nutrition: 12hr tube feeds (-); change in EN to [employer] Standard 1.4 1300ml/day with 325ml boluses 4x/day (q6h). Consider PEG placement for long-term nutritional intervention if pt continues to be unable to tolerate PO intake   - TPN consult (): recommend tube feeds over TPN  - NGT placement confirmed by multiple CXR  - Neuro: MRI Head initially declined per patient, myasthenia labs wnl     #Transaminitis  - Improving, resolved  - AST/ALT: 111/123 ->>52/62->>107/81->>57/50->86/67->101/82->>32/43  - Lipase: 94  - RUQ sono (): WNL    #Maternal Wellbeing/Routine Post-operative Care  - HSQ for DVT ppx  - C/w PO pain medications when able  - SW: recommend psychiatric evaluation  - Psychiatry: recommend anxiety medication (pt declines)  - c/w regular diet, outpatient f/u plan with DOROTEO Wyatt, PGY-3

## 2023-09-07 NOTE — CHART NOTE - NSCHARTNOTEFT_GEN_A_CORE
GI called for final recommendations as patient is pending discharge.   - Continue pantoprazole 40 mg BID on d/c  - Monitor caloric intake now that she is off TFs  - Patient can follow up with her GI or Dr. Hollie Escalante after discharge [phone]    Please call with questions.    [place]ia Tong, PGY6  Gastroenterology/Hepatology Fellow  Available on [employer] Teams  [phone] ([employer] Short Range Pager)  [phone] (Long Range Pager)    After 5pm, please contact the on-call GI fellow. [phone]

## 2023-09-07 NOTE — PROGRESS NOTE ADULT - SUBJECTIVE AND OBJECTIVE BOX
OB Progress Note:  PPD#2    S: Patient seen and examined at bedside. NGT removed yesterday evening. Patient tolerating light regular diet. Passing flatus. Had bowel movement yesterday. She is voiding spontaneously, and ambulating without difficulty. Denies CP/SOB. Denies lightheadedness/dizziness. Denies N/V.    O:  Vitals:   Vital Signs Last 24 Hrs  T(C): 36.6 (07 Sep 2023 05:39), Max: 37.2 (06 Sep 2023 16:58)  T(F): 97.9 (07 Sep 2023 05:39), Max: 98.9 (06 Sep 2023 16:58)  HR: 86 (07 Sep 2023 05:39) (81 - 100)  BP: 117/74 (07 Sep 2023 05:39) (112/77 - 120/78)  BP(mean): --  RR: 18 (07 Sep 2023 05:39) (18 - 18)  SpO2: 100% (07 Sep 2023 05:39) (100% - 100%)    Parameters below as of 07 Sep 2023 05:39  Patient On (Oxygen Delivery Method): room air        MEDICATIONS  (STANDING):  acetaminophen   Oral Liquid .. 975 milliGRAM(s) Oral every 6 hours  artificial tears (preservative free) Ophthalmic Solution 1 Drop(s) Both EYES four times a day  diphtheria/tetanus/pertussis (acellular) Vaccine (Adacel) 0.5 milliLiter(s) IntraMuscular once  ferrous    sulfate 325 milliGRAM(s) Oral daily  folic acid 1 milliGRAM(s) Enteral Tube daily  heparin   Injectable 5000 Unit(s) SubCutaneous every 12 hours  ibuprofen  Suspension. 600 milliGRAM(s) Oral every 6 hours  ibuprofen  Tablet. 600 milliGRAM(s) Oral every 6 hours  ketorolac   Injectable 15 milliGRAM(s) IV Push every 6 hours  oxyCODONE    Solution 5 milliGRAM(s) Oral once  pantoprazole  Injectable 40 milliGRAM(s) IV Push every 12 hours  prenatal multivitamin 1 Tablet(s) Oral daily    MEDICATIONS  (PRN):  diphenhydrAMINE 25 milliGRAM(s) Oral every 6 hours PRN Pruritus  lanolin Ointment 1 Application(s) Topical every 6 hours PRN Sore Nipples  magnesium hydroxide Suspension 30 milliLiter(s) Oral two times a day PRN Constipation  oxyCODONE    IR 5 milliGRAM(s) Oral once PRN Moderate to Severe Pain (4-10)  oxyCODONE    IR 5 milliGRAM(s) Oral every 3 hours PRN Moderate to Severe Pain (4-10)  simethicone 80 milliGRAM(s) Chew every 4 hours PRN Gas      Labs:  Blood type: A Positive  Rubella IgG: RPR: Negative                          8.6<L>   6.18 >-----------< 249    (  @ 05:42 )             26.6<L>    23 @ 05:42      139  |  104  |  8   ----------------------------<  85  3.8   |  24  |  0.58        Ca    9.3      07 Sep 2023 05:42    TPro  6.4  /  Alb  3.0<L>  /  TBili  0.4  /  DBili  x   /  AST  25  /  ALT  25  /  AlkPhos  122<H>  23 @ 05:42          Physical Exam:  General: NAD  Abdomen: soft, non-tender, non-distended, fundus firm  Vaginal: Lochia wnl  Extremities: No erythema/edema    A/P: 39yo PPD#2 s/p .  Patient is stable and doing well post-partum.    - Pain well controlled, continue current pain regimen  - Increase ambulation, SCDs when not ambulating  - Continue regular diet  - Discharge planning     Raegan Wyatt MD PGY1 Patient seen and examined at bedside. NGT removed yesterday evening. Patient tolerating light regular diet. Passing flatus. Had bowel movement yesterday. She is voiding spontaneously, and ambulating without difficulty. Denies CP/SOB. Denies lightheadedness/dizziness. Denies N/V.    O:  Vitals:   Vital Signs Last 24 Hrs  T(C): 36.6 (07 Sep 2023 05:39), Max: 37.2 (06 Sep 2023 16:58)  T(F): 97.9 (07 Sep 2023 05:39), Max: 98.9 (06 Sep 2023 16:58)  HR: 86 (07 Sep 2023 05:39) (81 - 100)  BP: 117/74 (07 Sep 2023 05:39) (112/77 - 120/78)  BP(mean): --  RR: 18 (07 Sep 2023 05:39) (18 - 18)  SpO2: 100% (07 Sep 2023 05:39) (100% - 100%)    Parameters below as of 07 Sep 2023 05:39  Patient On (Oxygen Delivery Method): room air        MEDICATIONS  (STANDING):  acetaminophen   Oral Liquid .. 975 milliGRAM(s) Oral every 6 hours  artificial tears (preservative free) Ophthalmic Solution 1 Drop(s) Both EYES four times a day  diphtheria/tetanus/pertussis (acellular) Vaccine (Adacel) 0.5 milliLiter(s) IntraMuscular once  ferrous    sulfate 325 milliGRAM(s) Oral daily  folic acid 1 milliGRAM(s) Enteral Tube daily  heparin   Injectable 5000 Unit(s) SubCutaneous every 12 hours  ibuprofen  Suspension. 600 milliGRAM(s) Oral every 6 hours  ibuprofen  Tablet. 600 milliGRAM(s) Oral every 6 hours  ketorolac   Injectable 15 milliGRAM(s) IV Push every 6 hours  oxyCODONE    Solution 5 milliGRAM(s) Oral once  pantoprazole  Injectable 40 milliGRAM(s) IV Push every 12 hours  prenatal multivitamin 1 Tablet(s) Oral daily    MEDICATIONS  (PRN):  diphenhydrAMINE 25 milliGRAM(s) Oral every 6 hours PRN Pruritus  lanolin Ointment 1 Application(s) Topical every 6 hours PRN Sore Nipples  magnesium hydroxide Suspension 30 milliLiter(s) Oral two times a day PRN Constipation  oxyCODONE    IR 5 milliGRAM(s) Oral once PRN Moderate to Severe Pain (4-10)  oxyCODONE    IR 5 milliGRAM(s) Oral every 3 hours PRN Moderate to Severe Pain (4-10)  simethicone 80 milliGRAM(s) Chew every 4 hours PRN Gas      Labs:  Blood type: A Positive  Rubella IgG: RPR: Negative                          8.6<L>   6.18 >-----------< 249    ( 09-07 @ 05:42 )             26.6<L>    09-07-23 @ 05:42      139  |  104  |  8   ----------------------------<  85  3.8   |  24  |  0.58        Ca    9.3      07 Sep 2023 05:42    TPro  6.4  /  Alb  3.0<L>  /  TBili  0.4  /  DBili  x   /  AST  25  /  ALT  25  /  AlkPhos  122<H>  09-07-23 @ 05:42          Physical Exam:  General: NAD  Abdomen: soft, non-tender, non-distended, fundus firm  Incision: Incision c/d/i   Vaginal: Lochia wnl  Extremities: No erythema/edema

## 2023-09-08 VITALS
TEMPERATURE: 98 F | RESPIRATION RATE: 18 BRPM | HEART RATE: 56 BPM | OXYGEN SATURATION: 100 % | DIASTOLIC BLOOD PRESSURE: 74 MMHG | SYSTOLIC BLOOD PRESSURE: 137 MMHG

## 2023-09-08 LAB — ACHR MOD AB SER-ACNC: 6 % — SIGNIFICANT CHANGE UP (ref 0–45)

## 2023-09-08 RX ADMIN — HEPARIN SODIUM 5000 UNIT(S): 5000 INJECTION INTRAVENOUS; SUBCUTANEOUS at 06:57

## 2023-09-08 RX ADMIN — PANTOPRAZOLE SODIUM 40 MILLIGRAM(S): 20 TABLET, DELAYED RELEASE ORAL at 06:56

## 2023-09-08 RX ADMIN — Medication 15 MILLIGRAM(S): at 15:04

## 2023-09-08 RX ADMIN — PANTOPRAZOLE SODIUM 40 MILLIGRAM(S): 20 TABLET, DELAYED RELEASE ORAL at 18:34

## 2023-09-08 RX ADMIN — Medication 15 MILLIGRAM(S): at 04:50

## 2023-09-08 RX ADMIN — SIMETHICONE 80 MILLIGRAM(S): 80 TABLET, CHEWABLE ORAL at 18:34

## 2023-09-08 RX ADMIN — HEPARIN SODIUM 5000 UNIT(S): 5000 INJECTION INTRAVENOUS; SUBCUTANEOUS at 18:34

## 2023-09-08 RX ADMIN — Medication 15 MILLIGRAM(S): at 16:00

## 2023-09-08 RX ADMIN — Medication 15 MILLIGRAM(S): at 05:50

## 2023-09-08 NOTE — PROGRESS NOTE ADULT - ASSESSMENT
39 y/o  POD#5 from [facility] for rupture of membranes with breech presentation initially admitted for inability to tolerate PO and workup of dysphagia for 2 months. Pregnancy c/b IUGR (3%, AC 2%, UAD wnl). Workup thus far unremarkable. Patient now s/p NGT, tolerating small amounts of regular diet. Awaiting MRI head/neck for eval of dysphagia     #Dysphagia  - Workup unremarkable, s/p NGT with tube feeds   - Tolerating regular diet  - GI: no identified cause, signed off  - EGD w/ motility study (): normal RAC pattern, with additional KO feeding tube placement  - Barium esophagram (): able to swallow contrast and barium pill without difficulty, no stricture or mass effect noted  - Viral and auto immune labs without identified cause    - ENT: flexible endoscope unremarkable, no acute intervention, f/u outpatient  - Daily weights: admission weight 56.6kg, most recently 55.4kg   - Nutrition: 12hr tube feeds (-); change in EN to [employer] Standard 1.4 1300ml/day with 325ml boluses 4x/day (q6h). Consider PEG placement for long-term nutritional intervention if pt continues to be unable to tolerate PO intake   - TPN consult (): recommend tube feeds over TPN  - Neuro: MRI Head initially declined by patient but now willing. To be completed today. MG labs wnl     #Transaminitis  - Improving, resolved  - AST/ALT: 111/123 ->>52/62->>107/81->>57/50->86/67->101/82->>32/43->>25/25  - Lipase: 94  - RUQ sono (): WNL    #Maternal Wellbeing/Routine Post-operative Care  - HSQ for DVT ppx  - C/w PO pain medications when able  - SW: recommend psychiatric evaluation  - Psychiatry: recommend anxiety medication (pt declines)  - c/w regular diet   - outpatient f/u with GI     ELIEZER Wyatt, PGY-3

## 2023-09-08 NOTE — CHART NOTE - NSCHARTNOTEFT_GEN_A_CORE
ATT:  Pt seen and evaluated by me She reports eating more solid foods and more liquids. She states she desires to go home. [place] has been discharged. Pt will be given followup info for GI, Neurology and Psychiatry and I will call her personally over the next few days to give her followup appt with me. Her  is available to pick her up this evening. Charlene Mcgarry MD

## 2023-09-08 NOTE — PROVIDER CONTACT NOTE (OTHER) - NAME OF MD/NP/PA/DO NOTIFIED:
Dr Oconnor
Dr. Andrews
MD KATHERIN Oconnor and MD Cooper
MD Luma Oconnor
MD Kenzie Ford
Dr. Oconnor
Raegan Thakurmed
Allison TANNER
MD Ge
Retinoid Dermatitis Aggressive Treatment: I recommended more frequent application of Cetaphil or CeraVe to the areas of dermatitis. I also prescribed a topical steroid for twice daily use until the dermatitis resolves.

## 2023-09-08 NOTE — PROGRESS NOTE ADULT - SUBJECTIVE AND OBJECTIVE BOX
R3 Antepartum Note,     Patient seen and examined at bedside. Continues to tolerate light amounts of regular diet. Passing flatus. Voiding spontaneously, and ambulating without difficulty. Denies CP/SOB. Denies lightheadedness/dizziness. Denies N/V.    Vital Signs Last 24 Hours  T(C): 36.5 (09-08-23 @ 14:01), Max: 37.1 (09-08-23 @ 05:30)  HR: 90 (09-08-23 @ 14:01) (61 - 90)  BP: 94/64 (09-08-23 @ 14:01) (94/64 - 107/65)  RR: 18 (09-08-23 @ 14:01) (18 - 18)  SpO2: 100% (09-08-23 @ 14:01) (99% - 100%)    CAPILLARY BLOOD GLUCOSE          Physical Exam:  General: NAD  Abdomen: soft, non-tender, non-distended, fundus firm  Incision: Incision c/d/i   Vaginal: Lochia wnl  Extremities: No erythema/edema    Labs:             8.6    6.18  )-----------( 249      ( 09-07 @ 05:42 )             26.6     09-07 @ 05:42    139  |  104  |  8   ----------------------------<  85  3.8   |  24  |  0.58    Ca    9.3      09-07 @ 05:42    TPro  6.4  /  Alb  3.0  /  TBili  0.4  /  DBili  x   /  AST  25  /  ALT  25  /  AlkPhos  122  09-07 @ 05:42            MEDICATIONS  (STANDING):  acetaminophen   Oral Liquid .. 975 milliGRAM(s) Oral every 6 hours  artificial tears (preservative free) Ophthalmic Solution 1 Drop(s) Both EYES four times a day  diphtheria/tetanus/pertussis (acellular) Vaccine (Adacel) 0.5 milliLiter(s) IntraMuscular once  ferrous    sulfate 325 milliGRAM(s) Oral daily  folic acid 1 milliGRAM(s) Enteral Tube daily  heparin   Injectable 5000 Unit(s) SubCutaneous every 12 hours  ibuprofen  Suspension. 600 milliGRAM(s) Oral every 6 hours  ibuprofen  Tablet. 600 milliGRAM(s) Oral every 6 hours  ketorolac   Injectable 15 milliGRAM(s) IV Push every 6 hours  oxyCODONE    Solution 5 milliGRAM(s) Oral once  pantoprazole  Injectable 40 milliGRAM(s) IV Push every 12 hours  prenatal multivitamin 1 Tablet(s) Oral daily    MEDICATIONS  (PRN):  diphenhydrAMINE 25 milliGRAM(s) Oral every 6 hours PRN Pruritus  lanolin Ointment 1 Application(s) Topical every 6 hours PRN Sore Nipples  magnesium hydroxide Suspension 30 milliLiter(s) Oral two times a day PRN Constipation  oxyCODONE    IR 5 milliGRAM(s) Oral every 3 hours PRN Moderate to Severe Pain (4-10)  oxyCODONE    IR 5 milliGRAM(s) Oral once PRN Moderate to Severe Pain (4-10)  simethicone 80 milliGRAM(s) Chew every 4 hours PRN Gas

## 2023-09-08 NOTE — PROVIDER CONTACT NOTE (OTHER) - BACKGROUND
@ 34.2 admitted with dysphagia, NGT placed on 23.
34.6, , admitted for malnutrition with NG tube placed 
IUP @ 36 weeks, [id number], malnutrition, GERD, NG tube in place right nostril
 @34+3 admitted for dysphagia, NGT placed on 23.
c/s 9/3 09:05am Perez inserted on 9/3
35.1 IUP. here for abdominal soreness, 11 lbs weight loss, inability to eat
C/S 9/3 @ 09:05 [address], S/P NGT
35+wks w/ NG tube placed for 11lb wt loss, unable to swallow food and eat post covid.
IUP @ 36.3, [id number], malnutrition, NG tube in place

## 2023-09-08 NOTE — PROGRESS NOTE ADULT - ASSESSMENT
39 y/o  POD#5 from [facility] for rupture of membranes with breech presentation initially admitted for inability to tolerate PO and workup of dysphagia for 2 months. Pregnancy c/b IUGR (3%, AC 2%, UAD wnl). Workup thus far unremarkable. Patient now s/p NGT, tolerating small amounts of regular diet. For MRI head today and discharge     #Dysphagia  - Workup unremarkable, s/p NGT with tube feeds   - Tolerating regular diet  - GI: no identified cause, signed off. To follow-up outpatient   - EGD w/ motility study (): normal RAC pattern, with additional KO feeding tube placement  - Barium esophagram (): able to swallow contrast and barium pill without difficulty, no stricture or mass effect noted  - Viral and auto immune labs without identified cause    - ENT: flexible endoscope unremarkable, no acute intervention, f/u outpatient  - Daily weights: admission weight 56.6kg, most recently 55.4kg   - Neuro: MRI Head initially declined per patient, myasthenia labs wnl     #Transaminitis  - Improving, resolved  - AST/ALT: 111/123 ->>52/62->>107/81->>57/50->86/67->101/82->>32/43  - Lipase: 94  - RUQ sono (): WNL    #Maternal Wellbeing/Routine Post-operative Care  - HSQ for DVT ppx  - C/w PO pain medications  - SW: recommend psychiatric evaluation  - Psychiatry: recommend anxiety medication (pt declines)  - c/w regular diet, outpatient f/u plan with GI and neuro       ELIEZER Wyatt, PGY-3

## 2023-09-08 NOTE — CHART NOTE - NSCHARTNOTEFT_GEN_A_CORE
Attempted to contact inpatient psychiatry for coordination of outpatient psych follow-up but was unable to reach. Obtained the following number for outpatient psych appointment scheduling from [facility] service:   [phone]  Will request patient to schedule    ELIEZER Wyatt, PGY-3

## 2023-09-08 NOTE — PROVIDER CONTACT NOTE (OTHER) - RECOMMENDATIONS
call MD
Discussed with patient the reason for the placement of NGT,  Patient is requesting to speak to Doctor. Dr Oconnor called to bedside to discuss plan of care.
RN encourages and educates on the importance of NG tube feeds for optimal health for mom and baby, labs in am, daily weights
Encourage and educate patient as to why the NG feeds should continue to promote optimal health for mom and baby
pt can refuse feeding at night but educate to be back on feeds in AM.
RN educated pt on the risk and importance of Perze discontinuation. Despite education and reinforcement Pt continues to refuse. Pain medications around the clock
to wait until results of placement are back prior to starting tube feeds.
To re-order CMP

## 2023-09-08 NOTE — PROVIDER CONTACT NOTE (OTHER) - DATE AND TIME:
08-Sep-2023 09:15
28-Aug-2023 22:30
02-Sep-2023 00:00
03-Sep-2023 19:45
22-Aug-2023 00:01
19-Aug-2023 01:30
24-Aug-2023 21:25
27-Aug-2023 00:05
19-Aug-2023 23:45

## 2023-09-08 NOTE — PROGRESS NOTE ADULT - ATTENDING COMMENTS
Patient seen at bedside, agree with assessment and plan as stated above  Tolerating small amounts of regular diet s/p NGT  Pending MRI results, possible discharge today or tomorrow    Duncan Boogie MD

## 2023-09-08 NOTE — PROVIDER CONTACT NOTE (OTHER) - SITUATION
Pt refusing NG tube feed. pt states that she wants to continue the feeds in the morning
pt unable to receive tube feed d/t no results from xray
Patient refusing to continue tube feeding and vitamin bag infusion
Pt refuse Perez to be D/c. Pt states, " leave the Perez in until tomorrow morning at 8am when I wake up, I don't feel comfortable to stand up to use the bathroom im in pain, the Perez is better"
Patient is requesting that NGT be discontinued.
pt refusing NG tube feeding, 1 hour left on feeding
Lab contaminated
Patient wants continuos feeds stopped
patient refusing NGT feedings throughout night. patient received NGT feedings from beginning of shift till 23:40.

## 2023-09-08 NOTE — PROGRESS NOTE ADULT - PROVIDER SPECIALTY LIST ADULT
Anesthesia
Anesthesia
OB
Gastroenterology
OB
Gastroenterology
Gastroenterology
Nutrition Support
OB
MFTOD
OB

## 2023-09-08 NOTE — PROVIDER CONTACT NOTE (OTHER) - ASSESSMENT
/52, HR 82, O2 100%, temp 98.6. NGT in place. pt stable.
pt resting in bed
Patients vitals remain WNL, denies any PTL symptoms, no chest pain, SOB, no emesis, ambulates in hallway
clear nedra color urine draining from Perez catheter 25ml noted. Previous RN stated Perez was emptied prior to
/61, P83, R18, 99%, T 36.9.  NGT placement confirmed by xray.
VSS, pt asymptomatic. no compliants
Vital signs stable.
Patients vitals remain WNL, denies any pain, ambulates in hallway, states + fetal movement, no c/o nausea or vomiting, no irritation at NG insertion site
new NG placed today as prior NG tube was dislodged when pt was showering. Xray ordered and performed for placement. no results are available as of yet.

## 2023-09-08 NOTE — PROGRESS NOTE ADULT - ATTENDING SUPERVISION STATEMENT
Resident
Fellow
Fellow
Resident
Fellow
Resident
Resident
Fellow
Resident

## 2023-09-08 NOTE — PROVIDER CONTACT NOTE (OTHER) - ACTION/TREATMENT ORDERED:
Labs to be drawn in am, daily weights
stop NGT feedings for night 23:40 till AM & educate pt importance of feedings.
No orders at this time
Patient still refusing NG feeds to continue, Dr. Andrews made aware, continue to assess
no new orders
providers aware of pt not receiving full 12hrs of tube feeds today. no new orders at this time.
No further MD orders

## 2023-09-08 NOTE — PROGRESS NOTE ADULT - SUBJECTIVE AND OBJECTIVE BOX
R3 Antepartum Note    Patient seen and examined at bedside, no acute overnight events. No acute complaints. Tolerating small amounts of a regular diet. Pt denies HA, epigastric pain, blurred vision, CP, SOB, N/V, fevers, and chills.    Vital Signs Last 24 Hours  T(C): 37.1 (09-08-23 @ 05:30), Max: 37.1 (09-08-23 @ 05:30)  HR: 61 (09-08-23 @ 05:30) (61 - 100)  BP: 107/65 (09-08-23 @ 05:30) (107/65 - 128/61)  RR: 18 (09-08-23 @ 05:30) (18 - 18)  SpO2: 99% (09-08-23 @ 05:30) (99% - 99%)    CAPILLARY BLOOD GLUCOSE          Physical Exam:  General: NAD  Abdomen: Soft, non-tender, gravid  Ext: No pain or swelling    Labs:             8.6    6.18  )-----------( 249      ( 09-07 @ 05:42 )             26.6     09-07 @ 05:42    139  |  104  |  8   ----------------------------<  85  3.8   |  24  |  0.58    Ca    9.3      09-07 @ 05:42    TPro  6.4  /  Alb  3.0  /  TBili  0.4  /  DBili  x   /  AST  25  /  ALT  25  /  AlkPhos  122  09-07 @ 05:42            MEDICATIONS  (STANDING):  acetaminophen   Oral Liquid .. 975 milliGRAM(s) Oral every 6 hours  artificial tears (preservative free) Ophthalmic Solution 1 Drop(s) Both EYES four times a day  diphtheria/tetanus/pertussis (acellular) Vaccine (Adacel) 0.5 milliLiter(s) IntraMuscular once  ferrous    sulfate 325 milliGRAM(s) Oral daily  folic acid 1 milliGRAM(s) Enteral Tube daily  heparin   Injectable 5000 Unit(s) SubCutaneous every 12 hours  ibuprofen  Suspension. 600 milliGRAM(s) Oral every 6 hours  ibuprofen  Tablet. 600 milliGRAM(s) Oral every 6 hours  ketorolac   Injectable 15 milliGRAM(s) IV Push every 6 hours  oxyCODONE    Solution 5 milliGRAM(s) Oral once  pantoprazole  Injectable 40 milliGRAM(s) IV Push every 12 hours  prenatal multivitamin 1 Tablet(s) Oral daily    MEDICATIONS  (PRN):  diphenhydrAMINE 25 milliGRAM(s) Oral every 6 hours PRN Pruritus  lanolin Ointment 1 Application(s) Topical every 6 hours PRN Sore Nipples  magnesium hydroxide Suspension 30 milliLiter(s) Oral two times a day PRN Constipation  oxyCODONE    IR 5 milliGRAM(s) Oral once PRN Moderate to Severe Pain (4-10)  oxyCODONE    IR 5 milliGRAM(s) Oral every 3 hours PRN Moderate to Severe Pain (4-10)  simethicone 80 milliGRAM(s) Chew every 4 hours PRN Gas

## 2023-09-08 NOTE — PROGRESS NOTE ADULT - NUTRITIONAL ASSESSMENT
Diet, NPO after Midnight:      NPO Start Date: 15-Aug-2023,   NPO Start Time: 23:59 (08-15-23 @ 11:38) [Active]  Diet, Clear Liquid:   Supplement Feeding Modality:  Oral  Ensure Clear Cans or Servings Per Day:  3       Frequency:  Three Times a day (08-15-23 @ 11:38) [Active]
This patient has been assessed with a concern for Malnutrition and has been determined to have a diagnosis/diagnoses of Severe protein-calorie malnutrition.    This patient is being managed with:   Diet Full Liquid-  No Beef  No Shellfish  Tube Feeding Modality: Nasogastric  TwoCal HN (TWOCALHNRTH)  Total Volume for 24 Hours (mL): 960  Continuous  Starting Tube Feed Rate {mL per Hour}: 10  Increase Tube Feed Rate by (mL): 10     Every 4 hours  Until Goal Tube Feed Rate (mL per Hour): 80  Tube Feed Duration (in Hours): 12  Tube Feed Start Time: 07:00  Tube Feed Stop Time: 19:00  Entered: Sep  3 2023  2:01PM  
This patient has been assessed with a concern for Malnutrition and has been determined to have a diagnosis/diagnoses of Severe protein-calorie malnutrition.    This patient is being managed with:   Diet Regular-  Halal  No Beef  No Shellfish  Entered: Sep  6 2023  5:21PM  
This patient has been assessed with a concern for Malnutrition and has been determined to have a diagnosis/diagnoses of Severe protein-calorie malnutrition.    This patient is being managed with:   Diet Full Liquid-  No Beef  No Shellfish  Tube Feeding Modality: Nasogastric  TwoCal HN (TWOCALHNRTH)  Total Volume for 24 Hours (mL): 960  Continuous  Starting Tube Feed Rate {mL per Hour}: 10  Increase Tube Feed Rate by (mL): 10     Every 4 hours  Until Goal Tube Feed Rate (mL per Hour): 80  Tube Feed Duration (in Hours): 12  Tube Feed Start Time: 07:00  Tube Feed Stop Time: 19:00  Entered: Aug 29 2023  9:45AM  
This patient has been assessed with a concern for Malnutrition and has been determined to have a diagnosis/diagnoses of Severe protein-calorie malnutrition.    This patient is being managed with:   Diet Full Liquid-  No Beef  No Shellfish  Tube Feeding Modality: Nasogastric  TwoCal HN (TWOCALHNRTH)  Total Volume for 24 Hours (mL): 960  Continuous  Starting Tube Feed Rate {mL per Hour}: 10  Increase Tube Feed Rate by (mL): 10     Every 4 hours  Until Goal Tube Feed Rate (mL per Hour): 80  Tube Feed Duration (in Hours): 12  Tube Feed Start Time: 07:00  Tube Feed Stop Time: 19:00  Entered: Aug 29 2023  9:45AM  
This patient has been assessed with a concern for Malnutrition and has been determined to have a diagnosis/diagnoses of Severe protein-calorie malnutrition.    This patient is being managed with:   Diet Regular-  Supplement Feeding Modality:  Oral  Ensure Clear Cans or Servings Per Day:  3       Frequency:  Three Times a day  Entered: Aug 15 2023  6:11PM  
This patient has been assessed with a concern for Malnutrition and has been determined to have a diagnosis/diagnoses of Severe protein-calorie malnutrition.    This patient is being managed with:   Diet Soft and Bite Sized-  No Beef  No Shellfish  Tube Feeding Modality: Nasojejunal  Jevity 1.5 Tanvir (JEVITY1.5RTH)  Total Volume for 24 Hours (mL): 840  Continuous  Starting Tube Feed Rate {mL per Hour}: 10  Increase Tube Feed Rate by (mL): 10     Every 4 hours  Until Goal Tube Feed Rate (mL per Hour): 70  Tube Feed Duration (in Hours): 12  Tube Feed Start Time: 07:00  Tube Feed Stop Time: 19:00  Entered: Aug 23 2023  4:04PM  
This patient has been assessed with a concern for Malnutrition and has been determined to have a diagnosis/diagnoses of Severe protein-calorie malnutrition.    This patient is being managed with:   Diet Soft and Bite Sized-  No Beef  No Shellfish  Tube Feeding Modality: Nasojejunal  Jevity 1.5 Tanvir (JEVITY1.5RTH)  Total Volume for 24 Hours (mL): 840  Continuous  Starting Tube Feed Rate {mL per Hour}: 10  Increase Tube Feed Rate by (mL): 10     Every 4 hours  Until Goal Tube Feed Rate (mL per Hour): 70  Tube Feed Duration (in Hours): 12  Tube Feed Start Time: 07:00  Tube Feed Stop Time: 19:00  Entered: Aug 23 2023  4:04PM  
This patient has been assessed with a concern for Malnutrition and has been determined to have a diagnosis/diagnoses of Severe protein-calorie malnutrition.    This patient is being managed with:   Diet Soft and Bite Sized-  No Beef  No Shellfish  Tube Feeding Modality: Nasojejunal  TwoCal HN (TWOCALHNRTH)  Total Volume for 24 Hours (mL): 840  Continuous  Starting Tube Feed Rate {mL per Hour}: 10  Increase Tube Feed Rate by (mL): 10     Every 4 hours  Until Goal Tube Feed Rate (mL per Hour): 70  Tube Feed Duration (in Hours): 12  Tube Feed Start Time: 07:00  Tube Feed Stop Time: 19:00  Entered: Aug 26 2023  1:37PM  
This patient has been assessed with a concern for Malnutrition and has been determined to have a diagnosis/diagnoses of Severe protein-calorie malnutrition.    This patient is being managed with:   Diet Full Liquid-  No Beef  No Shellfish  Tube Feeding Modality: Nasogastric  TwoCal HN (TWOCALHNRTH)  Total Volume for 24 Hours (mL): 960  Continuous  Starting Tube Feed Rate {mL per Hour}: 10  Increase Tube Feed Rate by (mL): 10     Every 4 hours  Until Goal Tube Feed Rate (mL per Hour): 80  Tube Feed Duration (in Hours): 12  Tube Feed Start Time: 07:00  Tube Feed Stop Time: 19:00  Entered: Aug 29 2023  9:45AM  
This patient has been assessed with a concern for Malnutrition and has been determined to have a diagnosis/diagnoses of Severe protein-calorie malnutrition.    This patient is being managed with:   Diet Full Liquid-  No Beef  No Shellfish  Tube Feeding Modality: Nasogastric  TwoCal HN (TWOCALHNRTH)  Total Volume for 24 Hours (mL): 960  Continuous  Starting Tube Feed Rate {mL per Hour}: 10  Increase Tube Feed Rate by (mL): 10     Every 4 hours  Until Goal Tube Feed Rate (mL per Hour): 80  Tube Feed Duration (in Hours): 12  Tube Feed Start Time: 07:00  Tube Feed Stop Time: 19:00  Entered: Aug 29 2023  9:45AM  
This patient has been assessed with a concern for Malnutrition and has been determined to have a diagnosis/diagnoses of Severe protein-calorie malnutrition.    This patient is being managed with:   Diet Full Liquid-  No Beef  No Shellfish  Tube Feeding Modality: Nasogastric  TwoCal HN (TWOCALHNRTH)  Total Volume for 24 Hours (mL): 960  Continuous  Starting Tube Feed Rate {mL per Hour}: 10  Increase Tube Feed Rate by (mL): 10     Every 4 hours  Until Goal Tube Feed Rate (mL per Hour): 80  Tube Feed Duration (in Hours): 12  Tube Feed Start Time: 07:00  Tube Feed Stop Time: 19:00  Entered: Sep  3 2023  2:01PM  
This patient has been assessed with a concern for Malnutrition and has been determined to have a diagnosis/diagnoses of Severe protein-calorie malnutrition.    This patient is being managed with:   Diet Full Liquid-  No Beef  No Shellfish  Tube Feeding Modality: Nasogastric  TwoCal HN (TWOCALHNRTH)  Total Volume for 24 Hours (mL): 960  Continuous  Starting Tube Feed Rate {mL per Hour}: 10  Increase Tube Feed Rate by (mL): 10     Every 4 hours  Until Goal Tube Feed Rate (mL per Hour): 80  Tube Feed Duration (in Hours): 12  Tube Feed Start Time: 07:00  Tube Feed Stop Time: 19:00  Entered: Sep  3 2023  2:01PM  
This patient has been assessed with a concern for Malnutrition and has been determined to have a diagnosis/diagnoses of Severe protein-calorie malnutrition.    This patient is being managed with:   Diet NPO after Midnight-     NPO Start Date: 14-Aug-2023   NPO Start Time: 23:59  Entered: Aug 14 2023  5:04PM    Diet Clear Liquid-  Entered: Aug 14 2023  2:35PM  
This patient has been assessed with a concern for Malnutrition and has been determined to have a diagnosis/diagnoses of Severe protein-calorie malnutrition.    This patient is being managed with:   Diet NPO with Tube Feed-  Tube Feeding Modality: Nasogastric  Pivot 1.5 Tanvir (PIVOT1.5RTH)  Total Volume for 24 Hours (mL): 600  Continuous  Starting Tube Feed Rate {mL per Hour}: 25  Increase Tube Feed Rate by (mL): 0  Until Goal Tube Feed Rate (mL per Hour): 25  Tube Feed Duration (in Hours): 24  Tube Feed Start Time: 18:00  Supplement Feeding Modality:  Orogastric  Ensure Clear Cans or Servings Per Day:  3       Frequency:  Daily  Entered: Aug 18 2023  3:18PM    The following pending diet order is being considered for treatment of Severe protein-calorie malnutrition:  Diet Regular-  Entered: Aug 16 2023  8:30AM  
This patient has been assessed with a concern for Malnutrition and has been determined to have a diagnosis/diagnoses of Severe protein-calorie malnutrition.    This patient is being managed with:   Diet Pureed-  No Beef  No Shellfish  Tube Feeding Modality: Nasogastric  TwoCal HN (TWOCALHNRTH)  Total Volume for 24 Hours (mL): 720  Continuous  Starting Tube Feed Rate {mL per Hour}: 30  Increase Tube Feed Rate by (mL): 10     Every 4 hours  Until Goal Tube Feed Rate (mL per Hour): 60  Tube Feed Duration (in Hours): 12  Tube Feed Start Time: 11:00  Tube Feed Stop Time: 23:00  Entered: Aug 20 2023  3:02PM  
This patient has been assessed with a concern for Malnutrition and has been determined to have a diagnosis/diagnoses of Severe protein-calorie malnutrition.    This patient is being managed with:   Diet Regular-  Halal  No Beef  No Shellfish  Entered: Sep  6 2023  5:21PM  
This patient has been assessed with a concern for Malnutrition and has been determined to have a diagnosis/diagnoses of Severe protein-calorie malnutrition.  
This patient has been assessed with a concern for Malnutrition and has been determined to have a diagnosis/diagnoses of Severe protein-calorie malnutrition.    This patient is being managed with:   Diet Full Liquid-  No Beef  No Shellfish  Tube Feeding Modality: Nasogastric  TwoCal HN (TWOCALHNRTH)  Continuous  Starting Tube Feed Rate {mL per Hour}: 10  Increase Tube Feed Rate by (mL): 10     Every 4 hours  Until Goal Tube Feed Rate (mL per Hour): 70  Tube Feed Duration (in Hours): 24  Tube Feed Start Time: 07:00  Tube Feed Stop Time: 19:00  Entered: Aug 28 2023  9:50AM  
This patient has been assessed with a concern for Malnutrition and has been determined to have a diagnosis/diagnoses of Severe protein-calorie malnutrition.    This patient is being managed with:   Diet Full Liquid-  No Beef  No Shellfish  Tube Feeding Modality: Nasogastric  TwoCal HN (TWOCALHNRTH)  Total Volume for 24 Hours (mL): 960  Continuous  Starting Tube Feed Rate {mL per Hour}: 10  Increase Tube Feed Rate by (mL): 10     Every 4 hours  Until Goal Tube Feed Rate (mL per Hour): 80  Tube Feed Duration (in Hours): 12  Tube Feed Start Time: 07:00  Tube Feed Stop Time: 19:00  Entered: Aug 29 2023  9:45AM  
This patient has been assessed with a concern for Malnutrition and has been determined to have a diagnosis/diagnoses of Severe protein-calorie malnutrition.    This patient is being managed with:   Diet Full Liquid-  No Beef  No Shellfish  Tube Feeding Modality: Nasogastric  TwoCal HN (TWOCALHNRTH)  Total Volume for 24 Hours (mL): 960  Continuous  Starting Tube Feed Rate {mL per Hour}: 10  Increase Tube Feed Rate by (mL): 10     Every 4 hours  Until Goal Tube Feed Rate (mL per Hour): 80  Tube Feed Duration (in Hours): 12  Tube Feed Start Time: 07:00  Tube Feed Stop Time: 19:00  Entered: Sep  3 2023  2:01PM  
This patient has been assessed with a concern for Malnutrition and has been determined to have a diagnosis/diagnoses of Severe protein-calorie malnutrition.    This patient is being managed with:   Diet NPO after Midnight-     NPO Start Date: 17-Aug-2023   NPO Start Time: 23:59  Entered: Aug 17 2023 12:34PM    Diet Regular-  Supplement Feeding Modality:  Oral  Vital 1.0 Cans or Servings Per Day:  3       Frequency:  Daily  Entered: Aug 16 2023  4:44PM    Diet Regular-  Entered: Aug 16 2023  8:30AM    The following pending diet order is being considered for treatment of Severe protein-calorie malnutrition:null
This patient has been assessed with a concern for Malnutrition and has been determined to have a diagnosis/diagnoses of Severe protein-calorie malnutrition.    This patient is being managed with:   Diet NPO with Tube Feed-  Tube Feeding Modality: Nasogastric  Pivot 1.5 Tanvir (PIVOT1.5RTH)  Total Volume for 24 Hours (mL): 600  Continuous  Starting Tube Feed Rate {mL per Hour}: 25  Increase Tube Feed Rate by (mL): 0  Until Goal Tube Feed Rate (mL per Hour): 25  Tube Feed Duration (in Hours): 24  Tube Feed Start Time: 18:00  Supplement Feeding Modality:  Orogastric  Ensure Clear Cans or Servings Per Day:  3       Frequency:  Daily  Entered: Aug 18 2023  3:18PM    The following pending diet order is being considered for treatment of Severe protein-calorie malnutrition:  Diet Regular-  Entered: Aug 16 2023  8:30AM  
This patient has been assessed with a concern for Malnutrition and has been determined to have a diagnosis/diagnoses of Severe protein-calorie malnutrition.    This patient is being managed with:   Diet NPO-  Entered: Aug 13 2023  2:10PM  
This patient has been assessed with a concern for Malnutrition and has been determined to have a diagnosis/diagnoses of Severe protein-calorie malnutrition.    This patient is being managed with:   Diet Pureed-  No Beef  No Shellfish  Tube Feeding Modality: Nasogastric  TwoCal HN (TWOCALHNRTH)  Total Volume for 24 Hours (mL): 720  Continuous  Starting Tube Feed Rate {mL per Hour}: 30  Increase Tube Feed Rate by (mL): 10     Every 4 hours  Until Goal Tube Feed Rate (mL per Hour): 60  Tube Feed Duration (in Hours): 12  Tube Feed Start Time: 11:00  Tube Feed Stop Time: 23:00  Entered: Aug 20 2023  3:02PM  
This patient has been assessed with a concern for Malnutrition and has been determined to have a diagnosis/diagnoses of Severe protein-calorie malnutrition.    This patient is being managed with:   Diet Regular-  Halal  No Beef  No Shellfish  Entered: Sep  6 2023  5:21PM  
This patient has been assessed with a concern for Malnutrition and has been determined to have a diagnosis/diagnoses of Severe protein-calorie malnutrition.    This patient is being managed with:   Diet Regular-  Supplement Feeding Modality:  Oral  Vital 1.0 Cans or Servings Per Day:  3       Frequency:  Daily  Entered: Aug 16 2023  4:44PM    Diet Regular-  Entered: Aug 16 2023  8:30AM    The following pending diet order is being considered for treatment of Severe protein-calorie malnutrition:null
This patient has been assessed with a concern for Malnutrition and has been determined to have a diagnosis/diagnoses of Severe protein-calorie malnutrition.    This patient is being managed with:   Diet Soft and Bite Sized-  No Beef  No Shellfish  Tube Feeding Modality: Nasojejunal  Jevity 1.5 Tanvir (JEVITY1.5RTH)  Total Volume for 24 Hours (mL): 840  Continuous  Starting Tube Feed Rate {mL per Hour}: 10  Increase Tube Feed Rate by (mL): 10     Every 4 hours  Until Goal Tube Feed Rate (mL per Hour): 70  Tube Feed Duration (in Hours): 12  Tube Feed Start Time: 07:00  Tube Feed Stop Time: 19:00  Entered: Aug 23 2023  4:04PM

## 2023-09-08 NOTE — PROVIDER CONTACT NOTE (OTHER) - REASON
Pt refused Perez to be discontinued
Lab contaminated
Patient wants continuos feeds stopped
pt unable to receive tube feed
NGT refuse at night
pt refusing NG tube feeding
Patient request that NGT  be discontinued.
pt refusing NG tube feed
Patient refusing to continue tube feeds and vitamin bag infusion

## 2023-09-08 NOTE — CHART NOTE - NSCHARTNOTEFT_GEN_A_CORE
Spoke to neuro regarding outpatient follow up. Initial neuro recs included MRI head to r/o structural causes of dysphagia. However, patient has been unable to tolerate imaging on 3 separate occasions. Will require outpatient follow-up. Recommended f/u with Dr. Carson, a neuromuscular specialist at the followin [facility]   [phone]    ELIEZER Wyatt, PGY-3

## 2023-09-09 ENCOUNTER — APPOINTMENT (OUTPATIENT)
Dept: OBGYN | Facility: HOSPITAL | Age: 38
End: 2023-09-09

## 2023-09-10 ENCOUNTER — NON-APPOINTMENT (OUTPATIENT)
Age: 38
End: 2023-09-10

## 2023-09-10 ENCOUNTER — EMERGENCY (EMERGENCY)
Facility: HOSPITAL | Age: 38
LOS: 1 days | Discharge: HOME | End: 2023-09-10
Attending: STUDENT IN AN ORGANIZED HEALTH CARE EDUCATION/TRAINING PROGRAM | Admitting: STUDENT IN AN ORGANIZED HEALTH CARE EDUCATION/TRAINING PROGRAM
Payer: COMMERCIAL

## 2023-09-10 VITALS
SYSTOLIC BLOOD PRESSURE: 125 MMHG | TEMPERATURE: 98 F | HEIGHT: 66 IN | OXYGEN SATURATION: 100 % | RESPIRATION RATE: 16 BRPM | HEART RATE: 75 BPM | DIASTOLIC BLOOD PRESSURE: 89 MMHG

## 2023-09-10 VITALS
OXYGEN SATURATION: 100 % | SYSTOLIC BLOOD PRESSURE: 129 MMHG | HEART RATE: 68 BPM | TEMPERATURE: 98 F | RESPIRATION RATE: 16 BRPM | DIASTOLIC BLOOD PRESSURE: 75 MMHG

## 2023-09-10 DIAGNOSIS — Z98.890 OTHER SPECIFIED POSTPROCEDURAL STATES: Chronic | ICD-10-CM

## 2023-09-10 LAB
ALBUMIN SERPL ELPH-MCNC: 3.5 G/DL — SIGNIFICANT CHANGE UP (ref 3.3–5)
ALP SERPL-CCNC: 113 U/L — SIGNIFICANT CHANGE UP (ref 40–120)
ALT FLD-CCNC: 19 U/L — SIGNIFICANT CHANGE UP (ref 4–33)
ANION GAP SERPL CALC-SCNC: 15 MMOL/L — HIGH (ref 7–14)
APPEARANCE UR: CLEAR — SIGNIFICANT CHANGE UP
AST SERPL-CCNC: 20 U/L — SIGNIFICANT CHANGE UP (ref 4–32)
BACTERIA # UR AUTO: ABNORMAL /HPF
BASE EXCESS BLDV CALC-SCNC: -0.5 MMOL/L — SIGNIFICANT CHANGE UP (ref -2–3)
BASOPHILS # BLD AUTO: 0.06 K/UL — SIGNIFICANT CHANGE UP (ref 0–0.2)
BASOPHILS NFR BLD AUTO: 0.8 % — SIGNIFICANT CHANGE UP (ref 0–2)
BILIRUB SERPL-MCNC: 0.3 MG/DL — SIGNIFICANT CHANGE UP (ref 0.2–1.2)
BILIRUB UR-MCNC: NEGATIVE — SIGNIFICANT CHANGE UP
BLOOD GAS VENOUS COMPREHENSIVE RESULT: SIGNIFICANT CHANGE UP
BUN SERPL-MCNC: 14 MG/DL — SIGNIFICANT CHANGE UP (ref 7–23)
CALCIUM SERPL-MCNC: 9.4 MG/DL — SIGNIFICANT CHANGE UP (ref 8.4–10.5)
CHLORIDE BLDV-SCNC: 106 MMOL/L — SIGNIFICANT CHANGE UP (ref 96–108)
CHLORIDE SERPL-SCNC: 102 MMOL/L — SIGNIFICANT CHANGE UP (ref 98–107)
CO2 BLDV-SCNC: 26.2 MMOL/L — HIGH (ref 22–26)
CO2 SERPL-SCNC: 22 MMOL/L — SIGNIFICANT CHANGE UP (ref 22–31)
COLOR SPEC: YELLOW — SIGNIFICANT CHANGE UP
CREAT SERPL-MCNC: 0.7 MG/DL — SIGNIFICANT CHANGE UP (ref 0.5–1.3)
DIFF PNL FLD: ABNORMAL
EGFR: 115 ML/MIN/1.73M2 — SIGNIFICANT CHANGE UP
EOSINOPHIL # BLD AUTO: 0.3 K/UL — SIGNIFICANT CHANGE UP (ref 0–0.5)
EOSINOPHIL NFR BLD AUTO: 3.9 % — SIGNIFICANT CHANGE UP (ref 0–6)
EPI CELLS # UR: 1 — SIGNIFICANT CHANGE UP
GAS PNL BLDV: 139 MMOL/L — SIGNIFICANT CHANGE UP (ref 136–145)
GLUCOSE BLDV-MCNC: 82 MG/DL — SIGNIFICANT CHANGE UP (ref 70–99)
GLUCOSE SERPL-MCNC: 87 MG/DL — SIGNIFICANT CHANGE UP (ref 70–99)
GLUCOSE UR QL: NEGATIVE MG/DL — SIGNIFICANT CHANGE UP
HCO3 BLDV-SCNC: 25 MMOL/L — SIGNIFICANT CHANGE UP (ref 22–29)
HCT VFR BLD CALC: 29.1 % — LOW (ref 34.5–45)
HCT VFR BLDA CALC: 28 % — LOW (ref 34.5–46.5)
HGB BLD CALC-MCNC: 9.4 G/DL — LOW (ref 11.7–16.1)
HGB BLD-MCNC: 9.2 G/DL — LOW (ref 11.5–15.5)
HYALINE CASTS # UR AUTO: SIGNIFICANT CHANGE UP
IANC: 4.68 K/UL — SIGNIFICANT CHANGE UP (ref 1.8–7.4)
IMM GRANULOCYTES NFR BLD AUTO: 0.9 % — SIGNIFICANT CHANGE UP (ref 0–0.9)
KETONES UR-MCNC: NEGATIVE MG/DL — SIGNIFICANT CHANGE UP
LACTATE BLDV-MCNC: 0.9 MMOL/L — SIGNIFICANT CHANGE UP (ref 0.5–2)
LEUKOCYTE ESTERASE UR-ACNC: NEGATIVE — SIGNIFICANT CHANGE UP
LIDOCAIN IGE QN: 56 U/L — SIGNIFICANT CHANGE UP (ref 7–60)
LYMPHOCYTES # BLD AUTO: 2.08 K/UL — SIGNIFICANT CHANGE UP (ref 1–3.3)
LYMPHOCYTES # BLD AUTO: 26.9 % — SIGNIFICANT CHANGE UP (ref 13–44)
MCHC RBC-ENTMCNC: 26.7 PG — LOW (ref 27–34)
MCHC RBC-ENTMCNC: 31.6 GM/DL — LOW (ref 32–36)
MCV RBC AUTO: 84.6 FL — SIGNIFICANT CHANGE UP (ref 80–100)
MONOCYTES # BLD AUTO: 0.53 K/UL — SIGNIFICANT CHANGE UP (ref 0–0.9)
MONOCYTES NFR BLD AUTO: 6.9 % — SIGNIFICANT CHANGE UP (ref 2–14)
NEUTROPHILS # BLD AUTO: 4.68 K/UL — SIGNIFICANT CHANGE UP (ref 1.8–7.4)
NEUTROPHILS NFR BLD AUTO: 60.6 % — SIGNIFICANT CHANGE UP (ref 43–77)
NITRITE UR-MCNC: NEGATIVE — SIGNIFICANT CHANGE UP
NRBC # BLD: 0 /100 WBCS — SIGNIFICANT CHANGE UP (ref 0–0)
NRBC # FLD: 0 K/UL — SIGNIFICANT CHANGE UP (ref 0–0)
PCO2 BLDV: 43 MMHG — SIGNIFICANT CHANGE UP (ref 39–52)
PH BLDV: 7.37 — SIGNIFICANT CHANGE UP (ref 7.32–7.43)
PH UR: 6 — SIGNIFICANT CHANGE UP (ref 5–8)
PLATELET # BLD AUTO: 280 K/UL — SIGNIFICANT CHANGE UP (ref 150–400)
PO2 BLDV: 35 MMHG — SIGNIFICANT CHANGE UP (ref 25–45)
POTASSIUM BLDV-SCNC: 3.9 MMOL/L — SIGNIFICANT CHANGE UP (ref 3.5–5.1)
POTASSIUM SERPL-MCNC: 3.9 MMOL/L — SIGNIFICANT CHANGE UP (ref 3.5–5.3)
POTASSIUM SERPL-SCNC: 3.9 MMOL/L — SIGNIFICANT CHANGE UP (ref 3.5–5.3)
PROT SERPL-MCNC: 6.9 G/DL — SIGNIFICANT CHANGE UP (ref 6–8.3)
PROT UR-MCNC: 30 MG/DL
RBC # BLD: 3.44 M/UL — LOW (ref 3.8–5.2)
RBC # FLD: 16.8 % — HIGH (ref 10.3–14.5)
RBC CASTS # UR COMP ASSIST: 4 /HPF — SIGNIFICANT CHANGE UP (ref 0–4)
SAO2 % BLDV: 48.1 % — LOW (ref 67–88)
SODIUM SERPL-SCNC: 139 MMOL/L — SIGNIFICANT CHANGE UP (ref 135–145)
SP GR SPEC: 1.03 — SIGNIFICANT CHANGE UP (ref 1–1.03)
UROBILINOGEN FLD QL: 0.2 MG/DL — SIGNIFICANT CHANGE UP (ref 0.2–1)
WBC # BLD: 7.72 K/UL — SIGNIFICANT CHANGE UP (ref 3.8–10.5)
WBC # FLD AUTO: 7.72 K/UL — SIGNIFICANT CHANGE UP (ref 3.8–10.5)
WBC UR QL: 1 /HPF — SIGNIFICANT CHANGE UP (ref 0–5)

## 2023-09-10 PROCEDURE — 93010 ELECTROCARDIOGRAM REPORT: CPT

## 2023-09-10 PROCEDURE — 71045 X-RAY EXAM CHEST 1 VIEW: CPT | Mod: 26

## 2023-09-10 PROCEDURE — 74177 CT ABD & PELVIS W/CONTRAST: CPT | Mod: 26,MA

## 2023-09-10 PROCEDURE — 99285 EMERGENCY DEPT VISIT HI MDM: CPT

## 2023-09-10 RX ORDER — SIMETHICONE 80 MG/1
1 TABLET, CHEWABLE ORAL
Qty: 120 | Refills: 0
Start: 2023-09-10

## 2023-09-10 RX ORDER — SODIUM CHLORIDE 9 MG/ML
1000 INJECTION INTRAMUSCULAR; INTRAVENOUS; SUBCUTANEOUS ONCE
Refills: 0 | Status: COMPLETED | OUTPATIENT
Start: 2023-09-10 | End: 2023-09-10

## 2023-09-10 RX ORDER — POLYETHYLENE GLYCOL 3350 17 G/17G
17 POWDER, FOR SOLUTION ORAL
Qty: 238 | Refills: 0
Start: 2023-09-10 | End: 2023-09-23

## 2023-09-10 RX ADMIN — SODIUM CHLORIDE 1000 MILLILITER(S): 9 INJECTION INTRAMUSCULAR; INTRAVENOUS; SUBCUTANEOUS at 09:42

## 2023-09-10 RX ADMIN — Medication 30 MILLILITER(S): at 12:42

## 2023-09-10 NOTE — CONSULT NOTE ADULT - SUBJECTIVE AND OBJECTIVE BOX
GYN Consult Note    38y G_P_  LMP * presents with   HPI:      OB/GYN HISTORY:   Physician:   Ob:   - G1:   - G2:   Gyn: Denies fibroids, cysts, PCOS, endometriosis, or history of genital lesions   PMH: Denies  PAST MEDICAL & SURGICAL HISTORY:  GERD (gastroesophageal reflux disease)      Acid reflux      History of dilation and curettage        PSH: Denies   Meds:  MEDICATIONS  (STANDING):  aluminum hydroxide/magnesium hydroxide/simethicone Suspension 30 milliLiter(s) Oral Once    MEDICATIONS  (PRN):    Allergies:   Allergies    shellfish (Hives)  Soy (Hives)  No Known Drug Allergies  Beef (Unknown)    Intolerances          REVIEW OF SYSTEMS  General: denies fevers, chills, tiredness  Skin/Breast: denies breast pain  Respiratory and Thorax: denies shortness of breath or cough  Cardiovascular: denies chest pain, palpitations  Gastrointestinal: denies abdominal pain, nausea/ vomiting	  Genitourinary: denies dysuria, increased urinary frequency, urgency, abnormal vaginal discharge,   Constitutional, Cardiovascular, Respiratory, Gastrointestinal, Genitourinary, Musculoskeletal and Integumentary review of systems are normal except as noted. 	      Vital Signs Last 24 Hrs  T(C): 36.7 (10 Sep 2023 12:29), Max: 36.7 (10 Sep 2023 12:29)  T(F): 98 (10 Sep 2023 12:29), Max: 98 (10 Sep 2023 12:29)  HR: 68 (10 Sep 2023 12:29) (68 - 75)  BP: 129/75 (10 Sep 2023 12:29) (125/89 - 129/75)  BP(mean): --  RR: 16 (10 Sep 2023 12:29) (16 - 16)  SpO2: 100% (10 Sep 2023 12:29) (100% - 100%)    Parameters below as of 10 Sep 2023 12:29  Patient On (Oxygen Delivery Method): room air        PHYSICAL EXAM: Chaperoned w/ RN at bedside.   Gen: NAD, alert and oriented x 3  Cardiovascular: regular   Respiratory: breathing comfortably on RA  Abd: soft, non tender, non-distended  Pelvic: closed/long, no CMT, Uterus: normal size, non tender  Adnexa: non tender, no palpable masses  Extremities: NTBL  Skin: warm and well perfused      LABS:                        9.2    7.72  )-----------( 280      ( 10 Sep 2023 09:36 )             29.1     09-10    139  |  102  |  14  ----------------------------<  87  3.9   |  22  |  0.70    Ca    9.4      10 Sep 2023 09:36    TPro  6.9  /  Alb  3.5  /  TBili  0.3  /  DBili  x   /  AST  20  /  ALT  19  /  AlkPhos  113  09-10      Urinalysis Basic - ( 10 Sep 2023 09:44 )    Color: Yellow / Appearance: Clear / S.029 / pH: x  Gluc: x / Ketone: Negative mg/dL  / Bili: Negative / Urobili: 0.2 mg/dL   Blood: x / Protein: 30 mg/dL / Nitrite: Negative   Leuk Esterase: Negative / RBC: 4 /HPF / WBC 1 /HPF   Sq Epi: x / Non Sq Epi: x / Bacteria: Occasional /HPF        RADIOLOGY & ADDITIONAL STUDIES:  < from: CT Abdomen and Pelvis w/ IV Cont (09.10.23 @ 11:03) >  FINDINGS:  LOWER CHEST: Diffuse hepatic steatosis.    LIVER: Within normal limits.  BILE DUCTS: Normal caliber.  GALLBLADDER: Question tiny gallstones.  SPLEEN: Within normal limits.  PANCREAS: Within normal limits.  ADRENALS: Within normal limits.  KIDNEYS/URETERS: Symmetric enhancement. No hydronephrosis.    BLADDER: Mild bladder wall thickening may likely related to   underdistention.  REPRODUCTIVE ORGANS: Post gravid uterus status post . No bladder   flap hematoma. The uterus is heterogeneous. There is extensive   subendometrial hypervascularity anteriorly. Small amount of   hyperattenuating material in the endometrial canal.    BOWEL: No bowel obstruction. Appendix is normal.  PERITONEUM: No ascites. Foci of intrapelvic free air are likely   postprocedural.  VESSELS: Within normal limits.  RETROPERITONEUM/LYMPH NODES: No lymphadenopathy.  ABDOMINAL WALL: Likely postprocedural pockets of air in the lower rectus   musculature.  BONES: Within normal limits.    IMPRESSION:    Post gravid uterus status post .  Extensive subendometrial hypervascularity may related to retained   products of conception or immediate postpartum state.  Recommend pelvic ultrasound for further evaluation.    --- End ofReport ---    < end of copied text > GYN Consult Note    38y , POD#7 from primary  for breech and PPROM @ 36w4d who presents to the ED with abdominal pain. Patient states her last BM was on Friday evening and this was also the last time she remembers passing flatus. Took simethicone when she was discharged from the hospital with flatus and relief of pain. Did not take anything at home. Denies any fevers, chills, N/V. Does report feeling some acid reflux and continued dysphagia, for which she was admitted most recently. Is tolerating PO fluid intake.    OB/GYN HISTORY:   Physician: Dr. Mcgarry  Ob: pCS @ 36w4d for PPROM and breech  Gyn: Denies fibroids, cysts, PCOS, endometriosis, or history of genital lesions   PMH:  GERD (gastroesophageal reflux disease)    Acid reflux    History of dilation and curettage      PSH: D&C  Meds:    aluminum hydroxide/magnesium hydroxide/simethicone Suspension 30 milliLiter(s) Oral Once    Allergies:   shellfish (Hives)  Soy (Hives)  No Known Drug Allergies  Beef (Unknown)      REVIEW OF SYSTEMS  General: denies fevers, chills, tiredness  Skin/Breast: denies breast pain  Respiratory and Thorax: denies shortness of breath or cough  Cardiovascular: denies chest pain, palpitations  Gastrointestinal: mild abdominal pain, denies nausea/ vomiting	  Genitourinary: denies dysuria, increased urinary frequency, urgency, abnormal vaginal discharge,   Constitutional, Cardiovascular, Respiratory, Gastrointestinal, Genitourinary, Musculoskeletal and Integumentary review of systems are normal except as noted. 	      Vital Signs Last 24 Hrs  T(C): 36.7 (10 Sep 2023 12:29), Max: 36.7 (10 Sep 2023 12:)  T(F): 98 (10 Sep 2023 12:29), Max: 98 (10 Sep 2023 12:29)  HR: 68 (10 Sep 2023 12:) (68 - 75)  BP: 129/75 (10 Sep 2023 12:) (125/89 - 129/75)  BP(mean): --  RR: 16 (10 Sep 2023 12:29) (16 - 16)  SpO2: 100% (10 Sep 2023 12:) (100% - 100%)    Parameters below as of 10 Sep 2023 12:29  Patient On (Oxygen Delivery Method): room air        PHYSICAL EXAM: Chaperoned w/ RN at bedside.   Gen: NAD, alert and oriented x 3  Cardiovascular: regular   Respiratory: breathing comfortably on RA  Abd: soft, tender over incision, minimal distended, tympanic to percussion of abdomen  Incision: Pfannenstiel skin incision well healing  Extremities: NTBL  Skin: warm and well perfused      LABS:                        9.2    7.72  )-----------( 280      ( 10 Sep 2023 09:36 )             29.1     09-10    139  |  102  |  14  ----------------------------<  87  3.9   |  22  |  0.70    Ca    9.4      10 Sep 2023 09:36    TPro  6.9  /  Alb  3.5  /  TBili  0.3  /  DBili  x   /  AST  20  /  ALT  19  /  AlkPhos  113  09-10      Urinalysis Basic - ( 10 Sep 2023 09:44 )    Color: Yellow / Appearance: Clear / S.029 / pH: x  Gluc: x / Ketone: Negative mg/dL  / Bili: Negative / Urobili: 0.2 mg/dL   Blood: x / Protein: 30 mg/dL / Nitrite: Negative   Leuk Esterase: Negative / RBC: 4 /HPF / WBC 1 /HPF   Sq Epi: x / Non Sq Epi: x / Bacteria: Occasional /HPF        RADIOLOGY & ADDITIONAL STUDIES:  < from: CT Abdomen and Pelvis w/ IV Cont (09.10.23 @ 11:03) >  FINDINGS:  LOWER CHEST: Diffuse hepatic steatosis.    LIVER: Within normal limits.  BILE DUCTS: Normal caliber.  GALLBLADDER: Question tiny gallstones.  SPLEEN: Within normal limits.  PANCREAS: Within normal limits.  ADRENALS: Within normal limits.  KIDNEYS/URETERS: Symmetric enhancement. No hydronephrosis.    BLADDER: Mild bladder wall thickening may likely related to   underdistention.  REPRODUCTIVE ORGANS: Post gravid uterus status post . No bladder   flap hematoma. The uterus is heterogeneous. There is extensive   subendometrial hypervascularity anteriorly. Small amount of   hyperattenuating material in the endometrial canal.    BOWEL: No bowel obstruction. Appendix is normal.  PERITONEUM: No ascites. Foci of intrapelvic free air are likely   postprocedural.  VESSELS: Within normal limits.  RETROPERITONEUM/LYMPH NODES: No lymphadenopathy.  ABDOMINAL WALL: Likely postprocedural pockets of air in the lower rectus   musculature.  BONES: Within normal limits.    IMPRESSION:    Post gravid uterus status post .  Extensive subendometrial hypervascularity may related to retained   products of conception or immediate postpartum state.  Recommend pelvic ultrasound for further evaluation.    --- End ofReport ---    < end of copied text >

## 2023-09-10 NOTE — CONSULT NOTE ADULT - ASSESSMENT
INCOMPLETE NOTE A/P: 38 yof who is POD#7 from recent , with recent treatment and management for dysphagia who presents to the ED with complaints of abdominal pain. Patient is currently taking PO Tylenol for post-op pain control. On exam, patient with mild distention and tympanic percussive sounds, and history consistent with likely gas pain. No evidence of SBO on CT and no indication of acute surgical complication.       - recommend rx for simethicone and miralax for GI regimen  - continue PO tylenol for pain relief  - f/u with OBGYN within the week  - no acute GYN interventions at this time    d/w Dr. Geraldo Rodriguez, PGY2

## 2023-09-10 NOTE — ED ADULT NURSE NOTE - NSFALLUNIVINTERV_ED_ALL_ED
Bed/Stretcher in lowest position, wheels locked, appropriate side rails in place/Call bell, personal items and telephone in reach/Instruct patient to call for assistance before getting out of bed/chair/stretcher/Non-slip footwear applied when patient is off stretcher/[place] to call system/Physically safe environment - no spills, clutter or unnecessary equipment/Purposeful proactive rounding/Room/bathroom lighting operational, light cord in reach

## 2023-09-10 NOTE — ED PROVIDER NOTE - CLINICAL SUMMARY MEDICAL DECISION MAKING FREE TEXT BOX
38-year-old female past medical history of  7 days ago who is presenting to the emergency department with abdominal pain.  Has not passed gas since yesterday.  Saw her GI doctor who recommended coming to the emergency department.  Patient does not have any vomiting.  No bowel movement.  Patient is uncomfortable appearing abdomen soft has diffuse tenderness to palpation.   wound is clean dry and intact no surrounding erythema.  Patient is also nontoxic-appearing.  Will obtain obstetrical consultation given recency of  surgery, obtain CT imaging to rule out SBO or other acute surgical pathology.  Patient was offered pain control but she is well controlled with the Tylenol she took prior to presentation will reassess patient

## 2023-09-10 NOTE — ED PROVIDER NOTE - PATIENT PORTAL LINK FT
You can access the FollowMyHealth Patient Portal offered by [facility] by registering at the following website: [email or web address]. By joining [facility]’s FollowMyHealth portal, you will also be able to view your health information using other applications (apps) compatible with our system.

## 2023-09-10 NOTE — ED ADULT NURSE NOTE - OBJECTIVE STATEMENT
Pt received to intake area complaining of abdominal pain and bloating. Pt states she had a  on 9/3 and was admitted to hospital x 1 month with feeding tube. Pt denies chest pain, sob, n/v/d, fever or chills.

## 2023-09-10 NOTE — ED ADULT TRIAGE NOTE - CHIEF COMPLAINT QUOTE
Patient c/o abdominal pain, gas, and bloating for one day. Pt. s/p  on 9/3/23. Denies nausea/vomiting, no fevers/chills. Last BM yesterday. Endorses dizziness. No PMH. Per L&D triage pt. to be seen in ED.

## 2023-09-13 ENCOUNTER — APPOINTMENT (OUTPATIENT)
Dept: GASTROENTEROLOGY | Facility: CLINIC | Age: 38
End: 2023-09-13
Payer: COMMERCIAL

## 2023-09-13 VITALS
OXYGEN SATURATION: 98 % | BODY MASS INDEX: 20.81 KG/M2 | WEIGHT: 106 LBS | DIASTOLIC BLOOD PRESSURE: 76 MMHG | HEART RATE: 81 BPM | TEMPERATURE: 98.1 F | HEIGHT: 60 IN | SYSTOLIC BLOOD PRESSURE: 114 MMHG

## 2023-09-13 DIAGNOSIS — R10.13 EPIGASTRIC PAIN: ICD-10-CM

## 2023-09-13 DIAGNOSIS — R14.3 FLATULENCE: ICD-10-CM

## 2023-09-13 DIAGNOSIS — R13.10 DYSPHAGIA, UNSPECIFIED: ICD-10-CM

## 2023-09-13 PROCEDURE — 99214 OFFICE O/P EST MOD 30 MIN: CPT

## 2023-09-13 RX ORDER — TANNIC/CHESTNUT/PEPPERMINT 275 MG
CAPSULE ORAL
Qty: 30 | Refills: 0 | Status: ACTIVE | COMMUNITY
Start: 2023-09-13 | End: 1900-01-01

## 2023-09-17 NOTE — ED POST DISCHARGE NOTE - DETAILS
Contacted pt at primary # listed; pt states she has been experiencing dysuria the past few days, along w/ urinary frequency.  Pt has f/u with Ob/GYN Dr. Bhatt.  Pt's pharmacy confirmed w/ pt; pt verbalizes agreement in taking antibiotic Rx; pt breastfeeding; pt states NKDA.

## 2023-09-17 NOTE — ED POST DISCHARGE NOTE - RESULT SUMMARY
9/10/23 UCX: 10-49,000 Enterococcus faecalis, <10,000 Normal Urogenital angella present; final result; s/r profile final result.

## 2023-09-17 NOTE — ED POST DISCHARGE NOTE - OTHER COMMUNICATION
Rx for Augmentin sent to pt's pharmacy.  Pt instructed to f/u with her Ob/Gyn at completion of antibiotic regimen for repeat urine testing to ensure resolution; pt verbalized understanding to all as discussed.

## 2023-09-18 ENCOUNTER — APPOINTMENT (OUTPATIENT)
Dept: OBGYN | Facility: CLINIC | Age: 38
End: 2023-09-18

## 2023-09-20 LAB — MISCELLANEOUS TEST NAME: SIGNIFICANT CHANGE UP

## 2023-09-23 LAB
CULTURE RESULTS: SIGNIFICANT CHANGE UP
SPECIMEN SOURCE: SIGNIFICANT CHANGE UP

## 2023-09-25 ENCOUNTER — APPOINTMENT (OUTPATIENT)
Dept: GASTROENTEROLOGY | Facility: CLINIC | Age: 38
End: 2023-09-25
Payer: COMMERCIAL

## 2023-09-25 VITALS
WEIGHT: 105 LBS | HEIGHT: 60 IN | OXYGEN SATURATION: 98 % | HEART RATE: 90 BPM | BODY MASS INDEX: 20.62 KG/M2 | DIASTOLIC BLOOD PRESSURE: 65 MMHG | SYSTOLIC BLOOD PRESSURE: 105 MMHG

## 2023-09-25 DIAGNOSIS — R13.10 DYSPHAGIA, UNSPECIFIED: ICD-10-CM

## 2023-09-25 DIAGNOSIS — K21.9 GASTRO-ESOPHAGEAL REFLUX DISEASE W/OUT ESOPHAGITIS: ICD-10-CM

## 2023-09-25 DIAGNOSIS — R14.0 ABDOMINAL DISTENSION (GASEOUS): ICD-10-CM

## 2023-09-25 LAB
LRP4 AUTOANTIBODY TEST: SIGNIFICANT CHANGE UP
MUSK IGG SER IA-MCNC: SIGNIFICANT CHANGE UP

## 2023-09-25 PROCEDURE — 99214 OFFICE O/P EST MOD 30 MIN: CPT

## 2023-09-26 ENCOUNTER — APPOINTMENT (OUTPATIENT)
Dept: OBGYN | Facility: CLINIC | Age: 38
End: 2023-09-26

## 2023-09-26 ENCOUNTER — NON-APPOINTMENT (OUTPATIENT)
Age: 38
End: 2023-09-26

## 2023-09-26 ENCOUNTER — APPOINTMENT (OUTPATIENT)
Dept: OBGYN | Facility: CLINIC | Age: 38
End: 2023-09-26
Payer: COMMERCIAL

## 2023-09-26 VITALS
DIASTOLIC BLOOD PRESSURE: 82 MMHG | HEART RATE: 99 BPM | BODY MASS INDEX: 21.2 KG/M2 | HEIGHT: 60 IN | SYSTOLIC BLOOD PRESSURE: 125 MMHG | WEIGHT: 108 LBS

## 2023-09-26 DIAGNOSIS — Z87.440 PERSONAL HISTORY OF URINARY (TRACT) INFECTIONS: ICD-10-CM

## 2023-09-26 PROCEDURE — 0503F POSTPARTUM CARE VISIT: CPT

## 2023-10-04 ENCOUNTER — APPOINTMENT (OUTPATIENT)
Dept: OBGYN | Facility: CLINIC | Age: 38
End: 2023-10-04
Payer: COMMERCIAL

## 2023-10-04 DIAGNOSIS — R30.0 DYSURIA: ICD-10-CM

## 2023-10-04 PROCEDURE — 99211 OFF/OP EST MAY X REQ PHY/QHP: CPT

## 2023-10-04 PROCEDURE — 81007 URINE SCREEN FOR BACTERIA: CPT | Mod: QW

## 2023-10-05 LAB — SURGICAL PATHOLOGY STUDY: SIGNIFICANT CHANGE UP

## 2023-10-08 DIAGNOSIS — N39.0 URINARY TRACT INFECTION, SITE NOT SPECIFIED: ICD-10-CM

## 2023-10-08 LAB
BACTERIA UR CULT: ABNORMAL
BILIRUB UR QL STRIP: NEGATIVE
CLARITY UR: CLEAR
COLLECTION METHOD: NORMAL
GLUCOSE UR-MCNC: NEGATIVE
HCG UR QL: 0.2 EU/DL
HGB UR QL STRIP.AUTO: NEGATIVE
KETONES UR-MCNC: NEGATIVE
LEUKOCYTE ESTERASE UR QL STRIP: NEGATIVE
NITRITE UR QL STRIP: NEGATIVE
PH UR STRIP: 7
PROT UR STRIP-MCNC: NEGATIVE
SP GR UR STRIP: 1.02

## 2023-10-08 RX ORDER — AMPICILLIN 500 MG/1
500 CAPSULE ORAL EVERY 8 HOURS
Qty: 21 | Refills: 0 | Status: ACTIVE | COMMUNITY
Start: 2023-10-08 | End: 1900-01-01

## 2023-10-10 ENCOUNTER — OUTPATIENT (OUTPATIENT)
Dept: OUTPATIENT SERVICES | Facility: HOSPITAL | Age: 38
LOS: 1 days | Discharge: HOME | End: 2023-10-10
Payer: COMMERCIAL

## 2023-10-10 ENCOUNTER — APPOINTMENT (OUTPATIENT)
Dept: GASTROENTEROLOGY | Facility: HOSPITAL | Age: 38
End: 2023-10-10
Payer: COMMERCIAL

## 2023-10-10 VITALS
HEIGHT: 66 IN | HEART RATE: 74 BPM | WEIGHT: 108.03 LBS | RESPIRATION RATE: 18 BRPM | TEMPERATURE: 97 F | OXYGEN SATURATION: 100 % | SYSTOLIC BLOOD PRESSURE: 111 MMHG | DIASTOLIC BLOOD PRESSURE: 78 MMHG

## 2023-10-10 DIAGNOSIS — R13.10 DYSPHAGIA, UNSPECIFIED: ICD-10-CM

## 2023-10-10 DIAGNOSIS — Z98.890 OTHER SPECIFIED POSTPROCEDURAL STATES: Chronic | ICD-10-CM

## 2023-10-10 PROCEDURE — 91013 ESOPHGL MOTIL W/STIM/PERFUS: CPT | Mod: 26

## 2023-10-10 PROCEDURE — 91037 ESOPH IMPED FUNCTION TEST: CPT | Mod: 26

## 2023-10-10 PROCEDURE — 91010 ESOPHAGUS MOTILITY STUDY: CPT | Mod: 26

## 2023-10-10 DEVICE — CATH VERSAFLEX Z PH: Type: IMPLANTABLE DEVICE | Status: FUNCTIONAL

## 2023-10-11 PROCEDURE — 91038 ESOPH IMPED FUNCT TEST > 1HR: CPT | Mod: 26

## 2023-10-20 ENCOUNTER — APPOINTMENT (OUTPATIENT)
Dept: OBGYN | Facility: CLINIC | Age: 38
End: 2023-10-20
Payer: COMMERCIAL

## 2023-10-20 VITALS
DIASTOLIC BLOOD PRESSURE: 74 MMHG | BODY MASS INDEX: 21.01 KG/M2 | HEIGHT: 60 IN | SYSTOLIC BLOOD PRESSURE: 119 MMHG | WEIGHT: 107 LBS | HEART RATE: 69 BPM

## 2023-10-20 DIAGNOSIS — R30.0 DYSURIA: ICD-10-CM

## 2023-10-20 DIAGNOSIS — Z87.898 PERSONAL HISTORY OF OTHER SPECIFIED CONDITIONS: ICD-10-CM

## 2023-10-20 PROCEDURE — 0503F POSTPARTUM CARE VISIT: CPT

## 2023-10-23 RX ORDER — PANTOPRAZOLE 40 MG/1
40 TABLET, DELAYED RELEASE ORAL TWICE DAILY
Qty: 60 | Refills: 2 | Status: ACTIVE | COMMUNITY
Start: 1900-01-01 | End: 1900-01-01

## 2023-10-30 ENCOUNTER — APPOINTMENT (OUTPATIENT)
Dept: OBGYN | Facility: CLINIC | Age: 38
End: 2023-10-30

## 2023-11-07 LAB — BACTERIA UR CULT: NORMAL

## 2023-12-23 NOTE — HISTORY OF PRESENT ILLNESS
[No Sign of Infection] : is showing no signs of infection [Excellent Pain Control] : has excellent pain control [de-identified] : Dermabond is peeling off. THe patient was advised to start peeling it off in the shower  [de-identified] : starting to eat better and more often.  [de-identified] : rto for full postpartum evaluation [FreeTextEntry1] : 38 year old presents for a post partum visit. She has been hospitalized for 1 month due to malnutrition and was receiving nutrition via NG tube. She broke her water in the hospital and delivered by primary C/S. She went to the ER 2 days after discharged from L&D due to bloating and UTI. She was given abx. Now, she is doing very well. She is eating well. Her spirits have improved significantly.  She is undergoing GI testing.

## 2023-12-23 NOTE — HISTORY OF PRESENT ILLNESS
[Complications:___] : no complications [Rhogam] : Rhogam was not administered [Rubella Vaccine] : Rubella vaccine was not administered [Pertussis Vaccine] : Pertussis vaccine was not administered [BTL] : no tubal ligation [Breastfeeding] : not currently nursing [BF with Difficulty] : nursing without difficulty [Resumed Menses] : has not resumed her menses [Resumed [place]] : has not resumed intercourse [Intended Contraception] : the patient does not intended to use contraception postpartum [S/Sx PP Depression] : signs/symptoms of postpartum depression [Slow Progress] : is progressing slowly [de-identified] : pt refuses to go to have psychiatric evaluation. denies homocidal/suicidal ideation.  and grandparents taking care of .  [de-identified] : pt declined physical exam  [de-identified] : pt doing poorly, not eating well, neglecting self care, adamantly refuses to seek psychiatric help today but agrees to be evaluated in a few days to a week.  [de-identified] : well over an hour was spent with pt trying to convince her to seek psychiatric help. She adamantly refuses.

## 2024-02-05 NOTE — ED PROVIDER NOTE - GENITOURINARY [-], MLM
no dysuria Body Location Override (Optional - Billing Will Still Be Based On Selected Body Map Location If Applicable): December 2020= sternum Detail Level: Simple Size Of Lesion In Cm (Optional): 0

## 2024-04-25 NOTE — H&P PST ADULT - MS GEN HX ROS MEA POS PC
Patient no-showed today's appointment; appointment was for MTM.    Contacted patient to reschedule, no answer. LM for patient to call clinic to reschedule.    Joslyn Tejeda, Pharm.D., [facility]  Medication Therapy Management Pharmacist  [phone]        back pain

## 2024-06-22 NOTE — OB RN TRIAGE NOTE - NSDCBPNONINVDIASTOLIC_OBGYN_A_OB_NU
H2H at bedside. SBAR report given by this RN and patient ready for transfer to [facility].    68

## 2024-09-16 NOTE — ASU PREOP CHECKLIST - SITE MARKED BY ANESTHESIOLOGIST
n/a Duration Of Freeze Thaw-Cycle (Seconds): 3 Detail Level: Detailed Number Of Freeze-Thaw Cycles: 1 freeze-thaw cycle Render Post-Care Instructions In Note?: no Show Aperture Variable?: Yes Application Tool (Optional): Liquid Nitrogen Sprayer 0 = swallows foods/liquids without difficulty

## 2025-02-02 NOTE — OB PROVIDER DELIVERY SUMMARY - NS_PLACENTA_OBGYN_ALL_OB_DT
Pt presents to the IC with c/o a cough, congestion, sore throat, body aches and left ear pain for the last week. Cough is productive. No fevers.   
03-Sep-2023 09:06

## 2025-02-26 NOTE — ASU PREOP CHECKLIST - HAND OFF
Continue Regimen: ketoconazole 2 % shampoo BIW\\nWash scalp three days a week, on other days use preferred shampoo\\n\\nclobetasol 0.05 % scalp solution \\nApply to affected areas on scalp once daily every other day.
Detail Level: Zone
Continue Regimen: minoxidil 2.5 mg tablet \\nTake one tablet by mouth once daily.
Continue Regimen: desonide 0.05 % topical cream BID\\n Apply topically to affected areas on face once daily, every other day.
Initiate Treatment: tacrolimus 0.1 % topical ointment BID\\nQuantity: 100.0 g\\nSig: Apply to affected areas on abdomen twice daily\\n\\nclobetasol 0.05 % topical cream \\nQuantity: 60.0 g  Days Supply: 30\\nSig: Apply to affected areas on abdomen twice daily every other day
Initiate Treatment: clobetasol 0.05 % topical cream \\nApply to affected areas on abdomen twice daily every other day.\\n\\nElidel 1 % topical cream \\nApply to affected areas on abdomen for hyperpigmentation twice daily\\n\\ntretinoin 0.025 % topical cream \\nApply a pea sized amount to affected areas on back and abdomen once before bedtime for hyperpigmentation
yes

## 2025-05-16 NOTE — ED ADULT NURSE NOTE - NS ED NURSE RECORD ANOTHER HT AND WT
No 45-year-old female came to the emergency room chief complaint of fever body aches cough phlegm seen by the primary care doctor sent with a letter the fever has been going on for 5 days patient was sent to the emergency room for further evaluation for fever

## (undated) DEVICE — DENTURE CUP PINK

## (undated) DEVICE — BASIN EMESIS 10IN GRADUATED MAUVE

## (undated) DEVICE — UNDERPAD LINEN SAVER 17 X 24"

## (undated) DEVICE — BITE BLOCK ADULT 20 X 27MM (GREEN)

## (undated) DEVICE — BIOPSY FORCEP COLD DISP

## (undated) DEVICE — SUCTION YANKAUER NO CONTROL VENT

## (undated) DEVICE — TUBING IV SET GRAVITY 3Y 100" MACRO

## (undated) DEVICE — CLAMP BX HOT RAD JAW 3

## (undated) DEVICE — BIOPSY FORCEP RADIAL JAW 4 STANDARD WITH NEEDLE

## (undated) DEVICE — PACK IV START WITH CHG

## (undated) DEVICE — ELCTR ECG CONDUCTIVE ADHESIVE

## (undated) DEVICE — TUBING MEDI-VAC W MAXIGRIP CONNECTORS 1/4"X6'

## (undated) DEVICE — SYR LUER LOK 50CC

## (undated) DEVICE — LINE BREATHE SAMPLNG

## (undated) DEVICE — DRSG 2X2

## (undated) DEVICE — LUBRICATING JELLY HR ONE SHOT 3G

## (undated) DEVICE — TUBING SUCTION CONN 6FT STERILE

## (undated) DEVICE — SYR LUER LOK 20CC

## (undated) DEVICE — CONTAINER FORMALIN 80ML YELLOW

## (undated) DEVICE — SALIVA EJECTOR (BLUE)

## (undated) DEVICE — DRSG CURITY GAUZE SPONGE 4 X 4" 12-PLY NON-STERILE

## (undated) DEVICE — FOLEY HOLDER STATLOCK 2 WAY ADULT

## (undated) DEVICE — GOWN LG

## (undated) DEVICE — DRSG BANDAID 0.75X3"

## (undated) DEVICE — CATH IV SAFE BC 22G X 1" (BLUE)

## (undated) DEVICE — SYR LUER LOK 5CC